# Patient Record
Sex: FEMALE | Race: WHITE | NOT HISPANIC OR LATINO
[De-identification: names, ages, dates, MRNs, and addresses within clinical notes are randomized per-mention and may not be internally consistent; named-entity substitution may affect disease eponyms.]

---

## 2017-04-06 ENCOUNTER — APPOINTMENT (OUTPATIENT)
Dept: PULMONOLOGY | Facility: CLINIC | Age: 72
End: 2017-04-06

## 2017-04-06 VITALS
DIASTOLIC BLOOD PRESSURE: 60 MMHG | SYSTOLIC BLOOD PRESSURE: 106 MMHG | HEART RATE: 81 BPM | OXYGEN SATURATION: 98 % | BODY MASS INDEX: 28.7 KG/M2 | HEIGHT: 63.5 IN | WEIGHT: 164 LBS

## 2017-04-07 ENCOUNTER — FORM ENCOUNTER (OUTPATIENT)
Age: 72
End: 2017-04-07

## 2017-04-08 ENCOUNTER — APPOINTMENT (OUTPATIENT)
Dept: CT IMAGING | Facility: CLINIC | Age: 72
End: 2017-04-08

## 2017-04-08 ENCOUNTER — OUTPATIENT (OUTPATIENT)
Dept: OUTPATIENT SERVICES | Facility: HOSPITAL | Age: 72
LOS: 1 days | End: 2017-04-08
Payer: MEDICARE

## 2017-04-08 DIAGNOSIS — R91.1 SOLITARY PULMONARY NODULE: ICD-10-CM

## 2017-04-08 PROCEDURE — 71250 CT THORAX DX C-: CPT

## 2017-10-19 ENCOUNTER — APPOINTMENT (OUTPATIENT)
Dept: PULMONOLOGY | Facility: CLINIC | Age: 72
End: 2017-10-19
Payer: MEDICARE

## 2017-10-19 VITALS
TEMPERATURE: 97.5 F | SYSTOLIC BLOOD PRESSURE: 114 MMHG | BODY MASS INDEX: 28.25 KG/M2 | OXYGEN SATURATION: 95 % | RESPIRATION RATE: 16 BRPM | HEART RATE: 91 BPM | DIASTOLIC BLOOD PRESSURE: 70 MMHG | WEIGHT: 162 LBS

## 2017-10-19 PROCEDURE — 94729 DIFFUSING CAPACITY: CPT

## 2017-10-19 PROCEDURE — 94060 EVALUATION OF WHEEZING: CPT

## 2017-10-19 PROCEDURE — 99215 OFFICE O/P EST HI 40 MIN: CPT | Mod: 25

## 2017-10-19 PROCEDURE — 94727 GAS DIL/WSHOT DETER LNG VOL: CPT

## 2018-04-19 ENCOUNTER — APPOINTMENT (OUTPATIENT)
Dept: PULMONOLOGY | Facility: CLINIC | Age: 73
End: 2018-04-19
Payer: MEDICARE

## 2018-04-19 VITALS
RESPIRATION RATE: 14 BRPM | DIASTOLIC BLOOD PRESSURE: 70 MMHG | TEMPERATURE: 97.5 F | OXYGEN SATURATION: 94 % | SYSTOLIC BLOOD PRESSURE: 112 MMHG | WEIGHT: 160 LBS | HEART RATE: 76 BPM | BODY MASS INDEX: 27.9 KG/M2

## 2018-04-19 PROCEDURE — 99214 OFFICE O/P EST MOD 30 MIN: CPT

## 2018-04-22 ENCOUNTER — FORM ENCOUNTER (OUTPATIENT)
Age: 73
End: 2018-04-22

## 2018-04-23 ENCOUNTER — APPOINTMENT (OUTPATIENT)
Dept: CT IMAGING | Facility: CLINIC | Age: 73
End: 2018-04-23
Payer: MEDICARE

## 2018-04-23 ENCOUNTER — OUTPATIENT (OUTPATIENT)
Dept: OUTPATIENT SERVICES | Facility: HOSPITAL | Age: 73
LOS: 1 days | End: 2018-04-23
Payer: MEDICARE

## 2018-04-23 DIAGNOSIS — Z00.8 ENCOUNTER FOR OTHER GENERAL EXAMINATION: ICD-10-CM

## 2018-04-23 PROCEDURE — 71250 CT THORAX DX C-: CPT

## 2018-04-23 PROCEDURE — 71250 CT THORAX DX C-: CPT | Mod: 26

## 2018-10-18 ENCOUNTER — APPOINTMENT (OUTPATIENT)
Dept: PULMONOLOGY | Facility: CLINIC | Age: 73
End: 2018-10-18
Payer: MEDICARE

## 2018-10-18 VITALS
HEART RATE: 81 BPM | RESPIRATION RATE: 12 BRPM | OXYGEN SATURATION: 97 % | BODY MASS INDEX: 27.38 KG/M2 | DIASTOLIC BLOOD PRESSURE: 70 MMHG | SYSTOLIC BLOOD PRESSURE: 126 MMHG | WEIGHT: 157 LBS | TEMPERATURE: 98.2 F

## 2018-10-18 PROCEDURE — 90662 IIV NO PRSV INCREASED AG IM: CPT

## 2018-10-18 PROCEDURE — G0008: CPT

## 2018-10-18 PROCEDURE — 99214 OFFICE O/P EST MOD 30 MIN: CPT | Mod: 25

## 2018-11-08 ENCOUNTER — MED ADMIN CHARGE (OUTPATIENT)
Age: 73
End: 2018-11-08

## 2019-04-18 ENCOUNTER — APPOINTMENT (OUTPATIENT)
Dept: PULMONOLOGY | Facility: CLINIC | Age: 74
End: 2019-04-18

## 2019-05-16 ENCOUNTER — APPOINTMENT (OUTPATIENT)
Dept: PULMONOLOGY | Facility: CLINIC | Age: 74
End: 2019-05-16
Payer: MEDICARE

## 2019-05-16 ENCOUNTER — TRANSCRIPTION ENCOUNTER (OUTPATIENT)
Age: 74
End: 2019-05-16

## 2019-05-16 VITALS
HEART RATE: 82 BPM | BODY MASS INDEX: 28.35 KG/M2 | HEIGHT: 63.5 IN | WEIGHT: 162 LBS | OXYGEN SATURATION: 95 % | SYSTOLIC BLOOD PRESSURE: 122 MMHG | DIASTOLIC BLOOD PRESSURE: 62 MMHG

## 2019-05-16 PROCEDURE — 99215 OFFICE O/P EST HI 40 MIN: CPT | Mod: 25

## 2019-05-16 PROCEDURE — 94060 EVALUATION OF WHEEZING: CPT

## 2019-05-16 PROCEDURE — 94729 DIFFUSING CAPACITY: CPT

## 2019-05-16 PROCEDURE — 94727 GAS DIL/WSHOT DETER LNG VOL: CPT

## 2019-05-16 RX ORDER — LANSOPRAZOLE 30 MG/1
30 CAPSULE, DELAYED RELEASE ORAL
Qty: 90 | Refills: 0 | Status: ACTIVE | COMMUNITY
Start: 2019-05-10

## 2019-05-16 RX ORDER — PREDNISONE 20 MG/1
20 TABLET ORAL
Qty: 10 | Refills: 0 | Status: COMPLETED | COMMUNITY
Start: 2018-12-17

## 2019-05-16 RX ORDER — DIAZEPAM 5 MG/1
5 TABLET ORAL
Qty: 90 | Refills: 0 | Status: ACTIVE | COMMUNITY
Start: 2019-04-18

## 2019-05-16 RX ORDER — FLUTICASONE PROPIONATE 50 UG/1
50 SPRAY, METERED NASAL
Qty: 16 | Refills: 0 | Status: ACTIVE | COMMUNITY
Start: 2018-12-17

## 2019-05-17 NOTE — PROCEDURE
[FreeTextEntry1] : PFT 5/56/19: Mild obstructive airways disease was noted. Lung volumes were normal. Diffusion was mildly reduced. No significant improvement was present after inhalation of bronchodilator.\par \par Chest CT from  April 23, 2018 demonstrated tree in bud opacities in the right upper lobe and a groundglass opacity in the right upper lobe as well. Emphysematous changes were noted.\par \par

## 2019-05-17 NOTE — HISTORY OF PRESENT ILLNESS
[FreeTextEntry1] : She came in for a routine followup visit today.\par \par She is feeling well. Denied any cough wheezing or shortness of breath. No chest pain, pressure or palpitations. She remains active. Baby sits for two grandchildren. \par \par Remains on on Symbicort and Spiriva.She does not smoke.

## 2019-05-17 NOTE — PHYSICAL EXAM
[Normal Appearance] : normal appearance [General Appearance - Well Developed] : well developed [Normal Oropharynx] : normal oropharynx [Heart Sounds] : normal S1 and S2 [Exaggerated Use Of Accessory Muscles For Inspiration] : no accessory muscle use [Abdomen Soft] : soft [Auscultation Breath Sounds / Voice Sounds] : lungs were clear to auscultation bilaterally [Bowel Sounds] : normal bowel sounds [Nail Clubbing] : no clubbing of the fingernails [Abnormal Walk] : normal gait [Skin Turgor] : normal skin turgor [No Focal Deficits] : no focal deficits [] : no rash [Oriented To Time, Place, And Person] : oriented to person, place, and time [General Appearance - In No Acute Distress] : no acute distress [Neck Cervical Mass (___cm)] : no neck mass was observed [Erythema] : no erythema of the pharynx

## 2019-05-17 NOTE — DISCUSSION/SUMMARY
[FreeTextEntry1] : She is a 74 year-old, former smoker (stopped in 2016) with moderate COPD. She also has abnormal findings on chest CT as noted above.\par \par Her COPD is clinically stable. She was advised to continue with Spiriva and Symbicort.\par \par Followup CT of the chest will be obtained.\par \par Follow up in 6 months. Sooner as needed.

## 2019-05-17 NOTE — REVIEW OF SYSTEMS
[Fatigue] : no fatigue [Cough] : no cough [Dyspnea] : no dyspnea [Hemoptysis] : no hemoptysis [Chest Tightness] : no chest tightness [Wheezing] : no wheezing [Pleuritic Pain] : no pleuritic pain [Palpitations] : no palpitations [PND] : no PND [Chest Discomfort] : no chest discomfort [Heartburn] : no heartburn [Edema] : ~T edema was not present [Myalgias] : no myalgias [Arthralgias] : no arthralgias [Rash] : no [unfilled] rash [Anemia] : no anemia [Difficulty Initiating Sleep] : no difficulty falling asleep [Diabetes] : no diabetes mellitus [Snoring] : no snoring

## 2019-08-04 ENCOUNTER — FORM ENCOUNTER (OUTPATIENT)
Age: 74
End: 2019-08-04

## 2019-08-05 ENCOUNTER — OUTPATIENT (OUTPATIENT)
Dept: OUTPATIENT SERVICES | Facility: HOSPITAL | Age: 74
LOS: 1 days | End: 2019-08-05
Payer: MEDICARE

## 2019-08-05 ENCOUNTER — APPOINTMENT (OUTPATIENT)
Dept: CT IMAGING | Facility: CLINIC | Age: 74
End: 2019-08-05
Payer: MEDICARE

## 2019-08-05 DIAGNOSIS — Z00.8 ENCOUNTER FOR OTHER GENERAL EXAMINATION: ICD-10-CM

## 2019-08-05 PROCEDURE — 71250 CT THORAX DX C-: CPT

## 2019-08-05 PROCEDURE — 71250 CT THORAX DX C-: CPT | Mod: 26

## 2019-11-07 ENCOUNTER — APPOINTMENT (OUTPATIENT)
Dept: GASTROENTEROLOGY | Facility: CLINIC | Age: 74
End: 2019-11-07

## 2019-11-14 ENCOUNTER — APPOINTMENT (OUTPATIENT)
Dept: PULMONOLOGY | Facility: CLINIC | Age: 74
End: 2019-11-14
Payer: MEDICARE

## 2019-11-14 VITALS
TEMPERATURE: 97.9 F | WEIGHT: 162 LBS | DIASTOLIC BLOOD PRESSURE: 83 MMHG | OXYGEN SATURATION: 94 % | HEART RATE: 83 BPM | RESPIRATION RATE: 16 BRPM | HEIGHT: 63 IN | BODY MASS INDEX: 28.7 KG/M2 | SYSTOLIC BLOOD PRESSURE: 123 MMHG

## 2019-11-14 PROCEDURE — G0008: CPT

## 2019-11-14 PROCEDURE — 90662 IIV NO PRSV INCREASED AG IM: CPT

## 2019-11-14 PROCEDURE — 99214 OFFICE O/P EST MOD 30 MIN: CPT | Mod: 25

## 2019-11-14 NOTE — PHYSICAL EXAM
[General Appearance - Well Developed] : well developed [Normal Appearance] : normal appearance [General Appearance - In No Acute Distress] : no acute distress [Normal Oropharynx] : normal oropharynx [Neck Cervical Mass (___cm)] : no neck mass was observed [Heart Sounds] : normal S1 and S2 [Auscultation Breath Sounds / Voice Sounds] : lungs were clear to auscultation bilaterally [Bowel Sounds] : normal bowel sounds [Abnormal Walk] : normal gait [Abdomen Soft] : soft [Nail Clubbing] : no clubbing of the fingernails [Skin Turgor] : normal skin turgor [] : no rash [No Focal Deficits] : no focal deficits [Oriented To Time, Place, And Person] : oriented to person, place, and time [Erythema] : no erythema of the pharynx

## 2019-11-14 NOTE — DISCUSSION/SUMMARY
[FreeTextEntry1] : She is a 74 year-old, former smoker (stopped in 2016) with moderate COPD. She also has abnormal findings on chest CT as noted above.\par \par Her COPD is stable. She was advised to continue with Spiriva and Symbicort (has been using Symbicort once a day and the Spiriva as needed). Proper use of the inhalers discussed. \par \par Flu shot given. \par \par Follow up in 6 months. Sooner as needed.

## 2019-11-14 NOTE — HISTORY OF PRESENT ILLNESS
[FreeTextEntry1] : She came in for a routine followup visit today.She is feeling well. Denied any cough wheezing or shortness of breath at rest. Has FORRESTER walking up steps. No chest pain, pressure or palpitations. She remains active.  \par \par Remains on on Symbicort and Spiriva.She does not smoke.

## 2019-11-14 NOTE — PROCEDURE
[FreeTextEntry1] : PFT 5/56/19: Mild obstructive airways disease was noted. Lung volumes were normal. Diffusion was mildly reduced. No significant improvement was present after inhalation of bronchodilator.\par \par CT Chest 4/23/18: Tree in bud opacities in the right upper lobe and a ground glass opacity in the right upper lobe was noted. Mild emphysematous changes were noted.\par \par CT Chest 8/5/19: No suspicious pulmonary nodules were noted. Emphysema was present. Evidence of airways inflammation was also noted. Heart size was normal.\par \par

## 2019-11-14 NOTE — REVIEW OF SYSTEMS
[Fever] : no fever [Fatigue] : no fatigue [Cough] : no cough [Dyspnea] : no dyspnea [Hemoptysis] : no hemoptysis [Wheezing] : no wheezing [Chest Discomfort] : no chest discomfort [PND] : no PND [Palpitations] : no palpitations [Edema] : ~T edema was not present [Heartburn] : no heartburn [Myalgias] : no myalgias [Arthralgias] : no arthralgias [Rash] : no [unfilled] rash [Diabetes] : no diabetes mellitus [Anemia] : no anemia [Difficulty Initiating Sleep] : no difficulty falling asleep [Thyroid Problem] : no thyroid problem [Snoring] : no snoring

## 2020-05-14 ENCOUNTER — APPOINTMENT (OUTPATIENT)
Dept: PULMONOLOGY | Facility: CLINIC | Age: 75
End: 2020-05-14
Payer: MEDICARE

## 2020-05-14 VITALS
SYSTOLIC BLOOD PRESSURE: 102 MMHG | RESPIRATION RATE: 16 BRPM | BODY MASS INDEX: 28.34 KG/M2 | DIASTOLIC BLOOD PRESSURE: 80 MMHG | HEART RATE: 90 BPM | TEMPERATURE: 98.1 F | WEIGHT: 160 LBS | OXYGEN SATURATION: 98 %

## 2020-05-14 PROCEDURE — 99214 OFFICE O/P EST MOD 30 MIN: CPT

## 2020-05-14 NOTE — REVIEW OF SYSTEMS
[Fever] : no fever [Chills] : no chills [Fatigue] : no fatigue [Cough] : no cough [Sputum] : not coughing up ~M sputum [Dyspnea] : dyspnea [Chest Tightness] : no chest tightness [Wheezing] : no wheezing [Chest Discomfort] : no chest discomfort [PND] : no PND [Palpitations] : no palpitations [Edema] : ~T edema was not present [Heartburn] : no heartburn [Myalgias] : no myalgias [Arthralgias] : no arthralgias [Rash] : no [unfilled] rash [Anemia] : no anemia [Diabetes] : no diabetes mellitus [Thyroid Problem] : no thyroid problem [DVT] : no DVT [Difficulty Initiating Sleep] : no difficulty falling asleep [Snoring] : no snoring

## 2020-05-14 NOTE — PHYSICAL EXAM
[General Appearance - In No Acute Distress] : no acute distress [Normal Oropharynx] : normal oropharynx [Neck Cervical Mass (___cm)] : no neck mass was observed [Auscultation Breath Sounds / Voice Sounds] : lungs were clear to auscultation bilaterally [Heart Sounds] : normal S1 and S2 [Bowel Sounds] : normal bowel sounds [Abnormal Walk] : normal gait [Nail Clubbing] : no clubbing of the fingernails [Skin Turgor] : normal skin turgor [Oriented To Time, Place, And Person] : oriented to person, place, and time [No Focal Deficits] : no focal deficits [Erythema] : no erythema of the pharynx [Neck Appearance] : the appearance of the neck was normal [] : no hepato-splenomegaly [Cyanosis, Localized] : no localized cyanosis

## 2020-05-14 NOTE — HISTORY OF PRESENT ILLNESS
[Former] : former [YearQuit] : 2016 [FreeTextEntry1] : She came in for a routine followup visit today.\par \par She is feeling well. Remains physically active and able to go for a walk daily. Has FORRESTER walking up steps. No chest pain, pressure or palpitations. \par \par Remains on on Symbicort and Spiriva.She does not smoke.

## 2020-05-14 NOTE — PROCEDURE
[FreeTextEntry1] : PFT 5/16/19: Mild obstructive airways disease was noted. Lung volumes were normal. Diffusion was mildly reduced. No significant improvement was present after inhalation of bronchodilator.\par \par CT Chest 4/23/18: Tree in bud opacities in the right upper lobe and a ground glass opacity in the right upper lobe was noted. Mild emphysematous changes were noted.\par \par CT Chest 8/5/19: No suspicious pulmonary nodules were noted. Emphysema was present. Evidence of airways inflammation was also noted. Heart size was normal.

## 2020-05-14 NOTE — DISCUSSION/SUMMARY
[FreeTextEntry1] : She is a 75 year-old, former smoker (stopped in 2016) with moderate COPD. She also has abnormal findings on chest CT as noted above.\par \par Her COPD remains stable. She was advised to continue with Spiriva and Symbicort (has been using Symbicort once a day and the Spiriva as needed). She was given a trial of Spiriva Respimat. Proper inhaler technique reviewed with the patient. \par \par Follow up in six months. Sooner if necessary.

## 2020-08-05 ENCOUNTER — APPOINTMENT (OUTPATIENT)
Dept: VASCULAR SURGERY | Facility: CLINIC | Age: 75
End: 2020-08-05

## 2020-11-19 ENCOUNTER — APPOINTMENT (OUTPATIENT)
Dept: PULMONOLOGY | Facility: CLINIC | Age: 75
End: 2020-11-19
Payer: MEDICARE

## 2020-11-19 VITALS
WEIGHT: 156 LBS | SYSTOLIC BLOOD PRESSURE: 110 MMHG | RESPIRATION RATE: 17 BRPM | OXYGEN SATURATION: 97 % | TEMPERATURE: 97 F | DIASTOLIC BLOOD PRESSURE: 86 MMHG | BODY MASS INDEX: 27.63 KG/M2 | HEART RATE: 78 BPM

## 2020-11-19 DIAGNOSIS — Z23 ENCOUNTER FOR IMMUNIZATION: ICD-10-CM

## 2020-11-19 PROCEDURE — 99214 OFFICE O/P EST MOD 30 MIN: CPT

## 2020-11-19 NOTE — REVIEW OF SYSTEMS
[Dyspnea] : dyspnea [Fatigue] : no fatigue [Nasal Congestion] : no nasal congestion [Cough] : no cough [Chest Tightness] : no chest tightness [Wheezing] : no wheezing [Chest Discomfort] : no chest discomfort [PND] : no PND [Palpitations] : no palpitations [Edema] : ~T edema was not present [Heartburn] : no heartburn [Myalgias] : no myalgias [Arthralgias] : no arthralgias [Rash] : no [unfilled] rash [Anemia] : no anemia [Diabetes] : no diabetes mellitus [Thyroid Problem] : no thyroid problem [DVT] : no DVT [Difficulty Initiating Sleep] : no difficulty falling asleep [Snoring] : no snoring

## 2020-11-19 NOTE — HISTORY OF PRESENT ILLNESS
[Former] : former [TextBox_4] : She came in for a routine followup visit today.  \par \par She is feeling well. Remains physically active and able to go for a walk daily.\par \par Remains on on Symbicort and Spiriva.\par \par She does not smoke.  [YearQuit] : 2016

## 2020-11-19 NOTE — PHYSICAL EXAM
[General Appearance - In No Acute Distress] : no acute distress [Neck Cervical Mass (___cm)] : no neck mass was observed [Heart Sounds] : normal S1 and S2 [Auscultation Breath Sounds / Voice Sounds] : lungs were clear to auscultation bilaterally [Abnormal Walk] : normal gait [Nail Clubbing] : no clubbing of the fingernails [Cyanosis, Localized] : no localized cyanosis [Skin Turgor] : normal skin turgor [] : no rash [No Focal Deficits] : no focal deficits [Oriented To Time, Place, And Person] : oriented to person, place, and time

## 2020-11-19 NOTE — DISCUSSION/SUMMARY
[FreeTextEntry1] : She is a 75 year-old, former smoker (stopped in 2016) with moderate COPD. CT Chest 8/5/19: No suspicious pulmonary nodules were noted. Emphysema was present. Evidence of airways inflammation was also noted. Heart size was normal.\par \par Her COPD remains stable. She was advised to continue with Spiriva and Symbicort \par \par CT in 6 months. \par \par Follow up 6 months. Sooner if necessary.

## 2021-05-20 ENCOUNTER — APPOINTMENT (OUTPATIENT)
Dept: PULMONOLOGY | Facility: CLINIC | Age: 76
End: 2021-05-20
Payer: MEDICARE

## 2021-05-20 VITALS
TEMPERATURE: 98 F | WEIGHT: 154 LBS | DIASTOLIC BLOOD PRESSURE: 70 MMHG | SYSTOLIC BLOOD PRESSURE: 111 MMHG | HEIGHT: 63 IN | OXYGEN SATURATION: 95 % | RESPIRATION RATE: 16 BRPM | BODY MASS INDEX: 27.29 KG/M2 | HEART RATE: 84 BPM

## 2021-05-20 PROCEDURE — 99213 OFFICE O/P EST LOW 20 MIN: CPT

## 2021-05-21 NOTE — DISCUSSION/SUMMARY
[FreeTextEntry1] : She is a 76 year-old, former smoker (stopped in 2016) with moderate COPD. CT from 4/23/18 showed tree in bud nodules with a ground glass nodule in the RUL. Atelectasis in lingula and RML present. CT Chest 8/5/19: No suspicious pulmonary nodules were noted. Emphysema was present. Evidence of airways inflammation was also noted. Heart size was normal.\par \par Her COPD remains stable. She was advised to continue with Spiriva and Symbicort \par \par Given the history of nodules and smoking a follow up CT of the Chest was requested. \par \par Follow up in 6 months.

## 2021-05-21 NOTE — HISTORY OF PRESENT ILLNESS
[Former] : former [TextBox_4] : She is feeling well. No complaint of cough, wheezing or shortness of breath. \par \par On Symbicort. \par \par She is not smoking.  [YearQuit] : 2016

## 2021-05-21 NOTE — REVIEW OF SYSTEMS
[Fever] : no fever [Fatigue] : no fatigue [Nasal Congestion] : no nasal congestion [Cough] : no cough [Sputum] : not coughing up ~M sputum [Dyspnea] : no dyspnea [Wheezing] : no wheezing [Chest Discomfort] : no chest discomfort [PND] : no PND [Edema] : ~T edema was not present [Heartburn] : no heartburn [Myalgias] : no myalgias [Arthralgias] : no arthralgias [Rash] : no [unfilled] rash [Anemia] : no anemia [Anxiety] : no anxiety [Diabetes] : no diabetes mellitus [Thyroid Problem] : no thyroid problem [DVT] : no DVT [Difficulty Initiating Sleep] : no difficulty falling asleep [Snoring] : no snoring

## 2021-06-02 ENCOUNTER — APPOINTMENT (OUTPATIENT)
Dept: VASCULAR SURGERY | Facility: CLINIC | Age: 76
End: 2021-06-02
Payer: MEDICARE

## 2021-06-02 ENCOUNTER — NON-APPOINTMENT (OUTPATIENT)
Age: 76
End: 2021-06-02

## 2021-06-02 VITALS
HEIGHT: 63 IN | BODY MASS INDEX: 27.11 KG/M2 | TEMPERATURE: 96.3 F | WEIGHT: 153 LBS | HEART RATE: 68 BPM | DIASTOLIC BLOOD PRESSURE: 81 MMHG | SYSTOLIC BLOOD PRESSURE: 131 MMHG

## 2021-06-02 DIAGNOSIS — L85.3 XEROSIS CUTIS: ICD-10-CM

## 2021-06-02 DIAGNOSIS — I83.893 VARICOSE VEINS OF BILATERAL LOWER EXTREMITIES WITH OTHER COMPLICATIONS: ICD-10-CM

## 2021-06-02 PROCEDURE — 93970 EXTREMITY STUDY: CPT

## 2021-06-02 PROCEDURE — 99203 OFFICE O/P NEW LOW 30 MIN: CPT

## 2021-06-02 RX ORDER — AMMONIUM LACTATE 12 %
12 CREAM (GRAM) TOPICAL TWICE DAILY
Qty: 280 | Refills: 6 | Status: ACTIVE | COMMUNITY
Start: 2021-06-02 | End: 1900-01-01

## 2021-06-10 ENCOUNTER — APPOINTMENT (OUTPATIENT)
Dept: NEUROLOGY | Facility: CLINIC | Age: 76
End: 2021-06-10
Payer: MEDICARE

## 2021-06-10 ENCOUNTER — LABORATORY RESULT (OUTPATIENT)
Age: 76
End: 2021-06-10

## 2021-06-10 ENCOUNTER — APPOINTMENT (OUTPATIENT)
Dept: CT IMAGING | Facility: CLINIC | Age: 76
End: 2021-06-10
Payer: MEDICARE

## 2021-06-10 ENCOUNTER — OUTPATIENT (OUTPATIENT)
Dept: OUTPATIENT SERVICES | Facility: HOSPITAL | Age: 76
LOS: 1 days | End: 2021-06-10
Payer: MEDICARE

## 2021-06-10 VITALS
HEART RATE: 64 BPM | DIASTOLIC BLOOD PRESSURE: 72 MMHG | HEIGHT: 63 IN | SYSTOLIC BLOOD PRESSURE: 140 MMHG | BODY MASS INDEX: 27.11 KG/M2 | WEIGHT: 153 LBS

## 2021-06-10 DIAGNOSIS — Z78.9 OTHER SPECIFIED HEALTH STATUS: ICD-10-CM

## 2021-06-10 DIAGNOSIS — R91.1 SOLITARY PULMONARY NODULE: ICD-10-CM

## 2021-06-10 DIAGNOSIS — Z83.79 FAMILY HISTORY OF OTHER DISEASES OF THE DIGESTIVE SYSTEM: ICD-10-CM

## 2021-06-10 PROCEDURE — 71250 CT THORAX DX C-: CPT | Mod: 26,ME

## 2021-06-10 PROCEDURE — 99204 OFFICE O/P NEW MOD 45 MIN: CPT

## 2021-06-10 PROCEDURE — G1004: CPT

## 2021-06-10 PROCEDURE — 71250 CT THORAX DX C-: CPT

## 2021-06-10 NOTE — HISTORY OF PRESENT ILLNESS
[FreeTextEntry1] : I had the pleasure of seeing Ms. MARIBEL GIBBS in the office today.  She is a 76 year right-handed patient who was referred for neurologic evaluation at your kind suggestion.\par \par Mrs. Gibbs was previously under the care of Dr. Brannon.  25 years ago, she experienced transient numbness of her right face.  MRI of the brain revealed a stroke.  Carotid Dopplers revealed plaque and she was treated with Plavix.  She was intolerant to statins.\par \par She has a history of cutaneous lymphoma.\par \par She has severe lower extremity venous insufficiency.  She complains of aching of her muscles in her arms and legs.  She experiences calf and toe cramping at night particularly after exertion.  She has been unable to spread her toes for about a year.  She complains of skin sensitivity in her feet and occasional tingling of her toes.  She has a history of intermittent low back pain.  She has a diseased right hip.  She has poor balance.  She suffers from positional vertigo.  She denies headaches, cognitive, visual, hearing, swallowing or bladder dysfunction.\par \par She completed vaccination for COVID-19.  She never contracted the virus.\par \par Medications include lansoprazole, clopidogrel, benzonatate, diazepam 5 mg daily, vitamin D3 and Symbicort.

## 2021-06-10 NOTE — CONSULT LETTER
[Dear  ___] : Dear  [unfilled], [Please see my note below.] : Please see my note below. [Consult Letter:] : I had the pleasure of evaluating your patient, [unfilled]. [Consult Closing:] : Thank you very much for allowing me to participate in the care of this patient.  If you have any questions, please do not hesitate to contact me. [Sincerely,] : Sincerely, [FreeTextEntry3] : Terrence Bell MD\par  [DrMakenna  ___] : Dr. PRUITT [DrMakenna ___] : Dr. PRUITT

## 2021-06-10 NOTE — PHYSICAL EXAM
[FreeTextEntry1] : Constitutional:  Patient was well-developed, well-nourished and in no acute distress. \par \par Head:  Normocephalic, atraumatic. Tympanic membranes were obscured by cerumen. \par \par Neck:  Supple with full range of motion. \par \par Cardiovascular:  Cardiac rhythm was regular without murmur. There were no carotid bruits. Peripheral pulses were full and symmetric. \par \par Respiratory:  Lungs were clear. \par \par Abdomen:  Soft and nontender. \par \par Spine: There was mild upper anterior scapular tenderness.  There was no pain on straight leg raising.  There is marked limitation of right hip movement.\par \par Skin:  There were no rashes. \par \par NEUROLOGICAL EXAMINATION:\par \par Mental Status: Patient was alert and oriented. Speech was fluent. There was no dysarthria. \par \par Cranial Nerves: \par \par II: She could finger count bilaterally. Pupils were equal and reactive. Visual fields were full. Funduscopic examination was normal. \par \par III, IV, VI:  Eye movements were full without nystagmus. \par \par V: Facial sensation was intact. \par \par VII: Facial strength was normal. \par \par VIII: Hearing was equal. \par \par IX, X: Palatal movement was normal. Phonation was normal. \par \par XI: Sternocleidomastoids and trapezii were normal. \par \par XII: Tongue was midline and movements normal. There was no lingual atrophy or fasciculations. \par \par Motor Examination: Muscle bulk, tone and strength were normal.  There was mild diffuse muscle tenderness.  There was no tremor or rigidity.  There were decreased fine finger and rapid alternating movements in the left more than right hands.\par \par Sensory Examination: There was shading pinprick in a stocking and glove distribution.  Vibration sense was mildly diminished in the feet.  Joint position sense was intact.\par \par Reflexes: DTRs were 1 at the biceps, 2 at the knees and absent elsewhere.\par \par Plantar Responses: Plantar responses were flexor. \par \par Coordination/Cerebellar Function: There was no dysmetria on finger to nose or heel to shin testing. \par \par Gait/Stance: Posture was mildly stooped.  Strides were short and stiff.  Turns were decomposed.  She swayed on Romberg testing.  Tandem was poor.\par

## 2021-06-10 NOTE — ASSESSMENT
[FreeTextEntry1] : Mrs. Rogers is a 76-year-old with untreated cutaneous lymphoma, COPD, GERD and remote history of stroke.  She presents with apparently longstanding myalgia, joint pains, muscle cramping, distal numbness and tingling and imbalance.  I suspect that she might suffer from a polyneuropathy.  Her prominent muscle complaints raise concern of stiff person syndrome or fibromyalgia.  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  I will obtain additional blood tests.  She will return to the office for EMG and nerve conduction studies.  Further management will depend upon these results and her clinical course.

## 2021-06-10 NOTE — REVIEW OF SYSTEMS
[Numbness] : numbness [Leg Weakness] : leg weakness [Abnormal Sensation] : an abnormal sensation [Tingling] : tingling [Difficulty Walking] : difficulty walking [Negative] : Heme/Lymph

## 2021-06-11 ENCOUNTER — NON-APPOINTMENT (OUTPATIENT)
Age: 76
End: 2021-06-11

## 2021-06-11 LAB
ALBUMIN MFR SERPL ELPH: 57 %
ALBUMIN SERPL ELPH-MCNC: 4.9 G/DL
ALBUMIN SERPL-MCNC: 4.3 G/DL
ALBUMIN/GLOB SERPL: 1.3 RATIO
ALP BLD-CCNC: 64 U/L
ALPHA1 GLOB MFR SERPL ELPH: 4 %
ALPHA1 GLOB SERPL ELPH-MCNC: 0.3 G/DL
ALPHA2 GLOB MFR SERPL ELPH: 11.9 %
ALPHA2 GLOB SERPL ELPH-MCNC: 0.9 G/DL
ALT SERPL-CCNC: 12 U/L
ANION GAP SERPL CALC-SCNC: 13 MMOL/L
AST SERPL-CCNC: 19 U/L
B-GLOBULIN MFR SERPL ELPH: 12.3 %
B-GLOBULIN SERPL ELPH-MCNC: 0.9 G/DL
BASOPHILS # BLD AUTO: 0.02 K/UL
BASOPHILS NFR BLD AUTO: 0.5 %
BILIRUB SERPL-MCNC: 0.7 MG/DL
BUN SERPL-MCNC: 22 MG/DL
CALCIUM SERPL-MCNC: 9.8 MG/DL
CHLORIDE SERPL-SCNC: 104 MMOL/L
CK SERPL-CCNC: 129 U/L
CO2 SERPL-SCNC: 25 MMOL/L
CREAT SERPL-MCNC: 0.7 MG/DL
CRP SERPL-MCNC: 4 MG/L
DEPRECATED KAPPA LC FREE/LAMBDA SER: 1.41 RATIO
EOSINOPHIL # BLD AUTO: 0.1 K/UL
EOSINOPHIL NFR BLD AUTO: 2.3 %
ERYTHROCYTE [SEDIMENTATION RATE] IN BLOOD BY WESTERGREN METHOD: 27 MM/HR
ESTIMATED AVERAGE GLUCOSE: 117 MG/DL
GAMMA GLOB FLD ELPH-MCNC: 1.1 G/DL
GAMMA GLOB MFR SERPL ELPH: 14.8 %
GLUCOSE SERPL-MCNC: 92 MG/DL
HBA1C MFR BLD HPLC: 5.7 %
HCT VFR BLD CALC: 36.8 %
HCV AB SER QL: NONREACTIVE
HCV S/CO RATIO: 0.11 S/CO
HCYS SERPL-MCNC: 14.7 UMOL/L
HGB BLD-MCNC: 11.5 G/DL
IGA SER QL IEP: 351 MG/DL
IGG SER QL IEP: 1079 MG/DL
IGM SER QL IEP: 110 MG/DL
IMM GRANULOCYTES NFR BLD AUTO: 0 %
INTERPRETATION SERPL IEP-IMP: NORMAL
KAPPA LC CSF-MCNC: 1.49 MG/DL
KAPPA LC SERPL-MCNC: 2.1 MG/DL
LYMPHOCYTES # BLD AUTO: 2.39 K/UL
LYMPHOCYTES NFR BLD AUTO: 56 %
M PROTEIN SPEC IFE-MCNC: NORMAL
MAN DIFF?: NORMAL
MCHC RBC-ENTMCNC: 28.6 PG
MCHC RBC-ENTMCNC: 31.3 GM/DL
MCV RBC AUTO: 91.5 FL
MONOCYTES # BLD AUTO: 0.44 K/UL
MONOCYTES NFR BLD AUTO: 10.3 %
NEUTROPHILS # BLD AUTO: 1.32 K/UL
NEUTROPHILS NFR BLD AUTO: 30.9 %
PLATELET # BLD AUTO: 259 K/UL
POTASSIUM SERPL-SCNC: 3.9 MMOL/L
PROT SERPL-MCNC: 7.5 G/DL
PROT SERPL-MCNC: 7.6 G/DL
PROT SERPL-MCNC: 7.6 G/DL
RBC # BLD: 4.02 M/UL
RBC # FLD: 13.2 %
RHEUMATOID FACT SER QL: 14 IU/ML
SODIUM SERPL-SCNC: 142 MMOL/L
T PALLIDUM AB SER QL IA: NEGATIVE
TSH SERPL-ACNC: 1.39 UIU/ML
VIT B12 SERPL-MCNC: 314 PG/ML
WBC # FLD AUTO: 4.27 K/UL

## 2021-06-13 LAB
CCP AB SER IA-ACNC: <8 UNITS
RF+CCP IGG SER-IMP: NEGATIVE

## 2021-06-14 LAB
A-TOCOPHEROL VIT E SERPL-MCNC: 7.8 MG/L
ANACR T: NEGATIVE
BETA+GAMMA TOCOPHEROL SERPL-MCNC: 0.8 MG/L

## 2021-06-15 ENCOUNTER — NON-APPOINTMENT (OUTPATIENT)
Age: 76
End: 2021-06-15

## 2021-06-15 LAB
COPPER SERPL-MCNC: 120 UG/DL
IGA 24H UR QL IFE: NORMAL
VIT B6 SERPL-MCNC: 8.8 UG/L
ZINC SERPL-MCNC: 102 UG/DL

## 2021-06-16 LAB
AMPA-R ABCBA: NEGATIVE
AMPHIPHYSIN IGG TITR SER IF: NEGATIVE TITER
B BURGDOR AB SER-IMP: NEGATIVE
B BURGDOR IGM PATRN SER IB-IMP: NEGATIVE
B BURGDOR18KD IGG SER QL IB: NORMAL
B BURGDOR23KD IGG SER QL IB: NORMAL
B BURGDOR23KD IGM SER QL IB: NORMAL
B BURGDOR28KD IGG SER QL IB: NORMAL
B BURGDOR30KD IGG SER QL IB: NORMAL
B BURGDOR31KD IGG SER QL IB: NORMAL
B BURGDOR39KD IGG SER QL IB: PRESENT
B BURGDOR39KD IGM SER QL IB: NORMAL
B BURGDOR41KD IGG SER QL IB: NORMAL
B BURGDOR41KD IGM SER QL IB: NORMAL
B BURGDOR45KD IGG SER QL IB: NORMAL
B BURGDOR58KD IGG SER QL IB: NORMAL
B BURGDOR66KD IGG SER QL IB: NORMAL
B BURGDOR93KD IGG SER QL IB: NORMAL
CASPR2-IGG CBA, S: NEGATIVE
CV2 IGG TITR SER: NEGATIVE TITER
GABA-B ABCBA: NEGATIVE
GAD65 AB SER-MCNC: 0 NMOL/L
GLIAL NUC TYPE 1 AB TITR SER: NEGATIVE TITER
HTLV I+II AB SER QL: NORMAL
HU1 AB TITR SER: NEGATIVE TITER
HU2 AB TITR SER IF: NEGATIVE TITER
HU3 AB TITR SER: NEGATIVE TITER
IGLON5 IFA, S: NEGATIVE
IMMUNOLOGIST REVIEW: NORMAL
LGI1-IGG CBA, S: NEGATIVE
MYELOPEROXIDASE AB SER QL IA: <5 UNITS
MYELOPEROXIDASE CELLS FLD QL: NEGATIVE
NIF IFA, S: NEGATIVE
NMDA-R ABCBA: NEGATIVE
PCA-1 AB TITR SER: NEGATIVE TITER
PCA-2 AB TITR SER: NEGATIVE TITER
PCA-TR AB TITR SER: NEGATIVE TITER
PROTEINASE3 AB SER IA-ACNC: <5 UNITS
PROTEINASE3 AB SER-ACNC: NEGATIVE
REFLEX ADDED: NORMAL

## 2021-06-17 LAB — METHYLMALONATE SERPL-SCNC: 285 NMOL/L

## 2021-06-23 ENCOUNTER — NON-APPOINTMENT (OUTPATIENT)
Age: 76
End: 2021-06-23

## 2021-06-24 LAB — SENSORIMOTOR NEUROPATHY PROFILE W/ RECOMBX: NORMAL

## 2021-07-02 LAB — HEREDITARY NEUROPATHY PANEL: NEGATIVE

## 2021-08-17 ENCOUNTER — APPOINTMENT (OUTPATIENT)
Dept: NEUROLOGY | Facility: CLINIC | Age: 76
End: 2021-08-17
Payer: MEDICARE

## 2021-08-17 VITALS
BODY MASS INDEX: 27.11 KG/M2 | HEART RATE: 61 BPM | HEIGHT: 63 IN | DIASTOLIC BLOOD PRESSURE: 69 MMHG | SYSTOLIC BLOOD PRESSURE: 133 MMHG | WEIGHT: 153 LBS

## 2021-08-17 DIAGNOSIS — M54.16 RADICULOPATHY, LUMBAR REGION: ICD-10-CM

## 2021-08-17 DIAGNOSIS — G62.9 POLYNEUROPATHY, UNSPECIFIED: ICD-10-CM

## 2021-08-17 DIAGNOSIS — G57.61 LESION OF PLANTAR NERVE, RIGHT LOWER LIMB: ICD-10-CM

## 2021-08-17 PROCEDURE — 95911 NRV CNDJ TEST 9-10 STUDIES: CPT

## 2021-08-17 PROCEDURE — 95886 MUSC TEST DONE W/N TEST COMP: CPT

## 2021-08-17 NOTE — CONSULT LETTER
[Dear  ___] : Dear  [unfilled], [Consult Letter:] : I had the pleasure of evaluating your patient, [unfilled]. [Please see my note below.] : Please see my note below. [Consult Closing:] : Thank you very much for allowing me to participate in the care of this patient.  If you have any questions, please do not hesitate to contact me. [Sincerely,] : Sincerely, [FreeTextEntry3] : Terrence Bell MD\par  [DrMakenna  ___] : Dr. PRUITT [DrMakenna ___] : Dr. PRUITT

## 2021-08-17 NOTE — PROCEDURE
[FreeTextEntry1] : Nerve conduction studies and electromyography [FreeTextEntry2] : Lower extremity numbness and cramping and low back pain [FreeTextEntry3] : Electrodiagnostic testing was performed in the right upper and both lower extremities.\par \par The right radial and median sensory potentials were normal but conduction velocities were mildly slow.\par \par The median motor distal latency and compound muscle action potential were normal.  Median conduction velocity was mildly slow.  The median F wave was normal.\par \par The right sural nerve was nonreactive.  The left sural sensory potential was low in amplitude but conduction velocity was normal.\par \par The right peroneal motor distal latency and compound muscle action potential were normal.  Peroneal conduction velocity was mildly slow.  The left tibial motor distal latency was mildly prolonged and the right was normal.  The tibial compound muscle action potentials were low in amplitude but conduction velocities were normal.\par \par The right tibial F wave was nonreactive and left was mildly prolonged.  The tibial H reflexes were nonreactive.\par \par Needle electromyography was performed in several right lower extremity and lumbar paraspinal muscles.  There was no spontaneous activity noted in any muscle tested.  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  The absent or prolonged tibial late responses were possibly due to bilateral S1 radiculopathy.  The low amplitude tibial compound muscle action potentials were possibly consistent with bilateral plantar neuropathies.\par \par A comprehensive serologic evaluation including a hereditary neuropathy panel was unremarkable except for hemoglobin A1c of 5.7.  Alpha-tocopherol was mildly low.  Rheumatoid factor was mildly elevated.  Hemoglobin was 11.5.\par \par I reexamined Mrs. Rogers.  She has very prominent bilateral weakness of toe abduction and prominent sensory loss predominantly involving the plantar aspects of both feet.  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  Further management will depend upon those results and her clinical course.

## 2021-08-18 ENCOUNTER — APPOINTMENT (OUTPATIENT)
Dept: MRI IMAGING | Facility: CLINIC | Age: 76
End: 2021-08-18
Payer: MEDICARE

## 2021-08-18 ENCOUNTER — OUTPATIENT (OUTPATIENT)
Dept: OUTPATIENT SERVICES | Facility: HOSPITAL | Age: 76
LOS: 1 days | End: 2021-08-18
Payer: MEDICARE

## 2021-08-18 DIAGNOSIS — M54.5 LOW BACK PAIN: ICD-10-CM

## 2021-08-18 PROCEDURE — 73718 MRI LOWER EXTREMITY W/O DYE: CPT

## 2021-08-18 PROCEDURE — 72148 MRI LUMBAR SPINE W/O DYE: CPT | Mod: 26,MH

## 2021-08-18 PROCEDURE — 73718 MRI LOWER EXTREMITY W/O DYE: CPT | Mod: 26,RT,MH

## 2021-08-18 PROCEDURE — 72148 MRI LUMBAR SPINE W/O DYE: CPT

## 2021-08-22 ENCOUNTER — NON-APPOINTMENT (OUTPATIENT)
Age: 76
End: 2021-08-22

## 2021-08-24 ENCOUNTER — TRANSCRIPTION ENCOUNTER (OUTPATIENT)
Age: 76
End: 2021-08-24

## 2021-08-24 RX ORDER — BACLOFEN 5 MG/1
5 TABLET ORAL
Qty: 180 | Refills: 0 | Status: ACTIVE | COMMUNITY
Start: 2021-08-24 | End: 1900-01-01

## 2021-11-18 ENCOUNTER — APPOINTMENT (OUTPATIENT)
Dept: PULMONOLOGY | Facility: CLINIC | Age: 76
End: 2021-11-18

## 2021-12-13 ENCOUNTER — APPOINTMENT (OUTPATIENT)
Dept: PULMONOLOGY | Facility: CLINIC | Age: 76
End: 2021-12-13

## 2021-12-26 ENCOUNTER — EMERGENCY (EMERGENCY)
Facility: HOSPITAL | Age: 76
LOS: 1 days | Discharge: HOME | End: 2021-12-26
Attending: EMERGENCY MEDICINE
Payer: MEDICARE

## 2021-12-26 VITALS
DIASTOLIC BLOOD PRESSURE: 84 MMHG | TEMPERATURE: 98 F | SYSTOLIC BLOOD PRESSURE: 146 MMHG | HEIGHT: 64 IN | RESPIRATION RATE: 18 BRPM | HEART RATE: 78 BPM | OXYGEN SATURATION: 98 % | WEIGHT: 145.95 LBS

## 2021-12-26 VITALS
OXYGEN SATURATION: 98 % | HEART RATE: 76 BPM | TEMPERATURE: 98 F | DIASTOLIC BLOOD PRESSURE: 77 MMHG | RESPIRATION RATE: 16 BRPM | SYSTOLIC BLOOD PRESSURE: 132 MMHG

## 2021-12-26 PROCEDURE — 99285 EMERGENCY DEPT VISIT HI MDM: CPT | Mod: 25

## 2021-12-26 PROCEDURE — 73110 X-RAY EXAM OF WRIST: CPT

## 2021-12-26 PROCEDURE — 25605 CLTX DST RDL FX/EPHYS SEP W/: CPT | Mod: LT

## 2021-12-26 PROCEDURE — 99284 EMERGENCY DEPT VISIT MOD MDM: CPT | Mod: GC

## 2021-12-26 PROCEDURE — 73090 X-RAY EXAM OF FOREARM: CPT | Mod: 26,LT

## 2021-12-26 PROCEDURE — 73090 X-RAY EXAM OF FOREARM: CPT

## 2021-12-26 PROCEDURE — 73110 X-RAY EXAM OF WRIST: CPT | Mod: 26,LT

## 2021-12-26 PROCEDURE — 73130 X-RAY EXAM OF HAND: CPT | Mod: 26,LT

## 2021-12-26 PROCEDURE — 73130 X-RAY EXAM OF HAND: CPT

## 2021-12-26 PROCEDURE — 99283 EMERGENCY DEPT VISIT LOW MDM: CPT

## 2021-12-26 NOTE — ED PROVIDER NOTE - PHYSICAL EXAMINATION
gen: well appearing, left arm in sling  Mentation: AAO x 3  psych: mood appropriate  HEENT: airway patent, conjunctivae clear bilaterally  Cardio: RRR, no m/r/g, palpable radial pulse on left  Resp: normal BS b/l  GI: soft/nondistended/nontender  Neuro: sensation and motor function grossly intact, able to move fingers on left hand  Skin: No evidence of rash  MSK: Swelling of dorsal and volar aspect of the left wrist with tenderness to palpation gen: well appearing, left arm in sling  Mentation: AAO x 3  psych: mood appropriate  HEENT: airway patent, conjunctivae clear bilaterally  Cardio: RRR, no m/r/g, palpable radial pulse on left  Resp: normal BS b/l  GI: soft/nondistended/nontender  Neuro: sensation and motor function grossly intact, able to move fingers on left hand  Skin: No evidence of rash  MSK: Swelling of dorsal and volar aspect of the left wrist with tenderness to palpation      attn - alert, nad, head-, spine-, chest/ribs-, abdo-, upper extrem - deformity to left wrist, sensory intact, elbow/shoulder-, lower extrem-, neuro - grossly intact.

## 2021-12-26 NOTE — ED PROVIDER NOTE - PATIENT PORTAL LINK FT
You can access the FollowMyHealth Patient Portal offered by [facility] by registering at the following website: [email or web address]. By joining [facility]’s FollowMyHealth portal, you will also be able to view your health information using other applications (apps) compatible with our system.

## 2021-12-26 NOTE — ED PROVIDER NOTE - OBJECTIVE STATEMENT
76 year old female with a PMHx of TIAs, COPD, GERD presents after a fall. Patient had a mechanical trip and fall over a sprinkler and fell, bracing herself with left hand. Patient complains of pain and swelling. She took two extra strength tylenol. Patient takes plavix and pepcid. Denies hitting head, LOC, lightheadedness, CP, SOB, abdominal pain, nausea, vomiting. Patient is able to move her fingers. 76 year old female with a PMHx of TIAs, COPD, GERD presents after a fall. Patient had a mechanical trip and fall over a sprinkler and fell, bracing herself with left hand. Patient complains of pain and swelling. She took two extra strength tylenol. Patient takes plavix and pepcid. Denies hitting head, LOC, lightheadedness, CP, SOB, abdominal pain, nausea, vomiting. Patient is able to move her fingers.     Attn - pt seen in [facility]33[facility] - agree with above [place] and FOSH and c/o pain and deformity to left non-dominant wrist.  had paresthesia of finger tips - resolved.  no other injury.  no prior fx.

## 2021-12-26 NOTE — ED ADULT NURSE NOTE - OBJECTIVE STATEMENT
76F, AAO3, c/o L wrist pain s/p trip and fall over sprinkler head at the [facility] this AM. -LOC, denies hitting head, takes Plavix daily. Swelling and deformity noted to L hand and wrist. Able to move all fingers, denies numbness/tingling. Strong radial pulse noted. Denies dizziness. Speaking clear in full sentences. RR even and unlabored. Skin appropriate for age and race.

## 2021-12-26 NOTE — ED PROVIDER NOTE - CARE PROVIDER_API CALL
Phi Castro (MD)  Orthopaedic Surgery  [address], [address]  [place] [address]  Phone: [phone]  Fax: [phone]  Follow Up Time: 4-6 Days

## 2021-12-26 NOTE — ED PROVIDER NOTE - CLINICAL SUMMARY MEDICAL DECISION MAKING FREE TEXT BOX
Patient has a left wrist swelling following fall on left UEX. She complains of pain but asks not to be given any strong medication. She currently declines pain medication as she recently took tylenol. Xrays of left wrist, hand, and forearm. Patient has a left wrist swelling following fall on left UEX. She complains of pain but asks not to be given any strong medication. She currently declines pain medication as she recently took tylenol. Xrays of left wrist, hand, and forearm.     Attn - deformity and pain to left wrist - clinically fx - xray analgesia, ortho consult.

## 2021-12-26 NOTE — ED PROVIDER NOTE - NSFOLLOWUPINSTRUCTIONS_ED_ALL_ED_FT
Please follow up with Phi Castro MD on Friday, 12/31/21.     Wrist Fracture    A wrist fracture is a break or crack in one of the bones of your wrist. Your wrist is made up of eight small bones at the palm of your hand (carpal bones) and two long bones that make up your forearm (radius and ulna).    If the joint is stable and the bones are still in their normal position (nondisplaced), the injury may be treated with immobilization. This involves the use of a cast, splint, or sling to hold your arm in place. Immobilization ensures that your bones continue to stay in the correct position while your arm is healing.    What are the causes?  This condition may be caused by:    A direct force to the wrist.  Falling on an outstretched hand.  Trauma, such as a car accident or a fall.    What increases the risk?  This condition is more likely to develop in people who:    Do contact and high-risk sports, such as skiing, biking, and ice skating.  Take steroid medicines.  Smoke.  Are female.  Are .  Drink more than three alcoholic beverages per day.  Have low or lowered bone density (osteoporosis or osteopenia).  Are older.  Have a history of previous fractures.    What are the signs or symptoms?  Symptoms of this condition include:    Pain.  Swelling.  Bruising.  Not being able to move the wrist normally.    Additionally, the wrist may hang in an odd position or appear deformed.    How is this diagnosed?  This condition may be diagnosed based on a physical exam and X-rays. You may also have a CT scan or MRI.    How is this treated?  Treatment for this condition involves wearing a cast or splint until the injured area is stable enough for you to begin range-of-motion exercises. You also may be given a sling. You may also be prescribed pain medicine.    Follow these instructions at home:  If you have a splint:     Wear the splint as told by your health care provider. Remove it only as told by your health care provider.  Loosen the splint if your fingers tingle, become numb, or turn cold and blue.  Do not let your splint get wet if it is not waterproof.  Keep the splint clean.  If you have a sling:     Wear it as told by your health care provider. Remove it only as told by your health care provider.  If you have a cast:     Do not stick anything inside the cast to scratch your skin. Doing that increases your risk of infection.  Check the skin around the cast every day. Report any concerns to your health care provider. You may put lotion on dry skin around the edges of the cast. Do not apply lotion to the skin underneath the cast.  Do not let your cast get wet if it is not waterproof.  Keep the cast clean.  Bathing     Do not take baths, swim, or use a hot tub until your health care provider approves. Ask your health care provider if you can take showers. You may only be allowed to take sponge baths for bathing.  If your cast or splint is not waterproof, cover it with a watertight plastic bag when you take a bath or a shower.  If you have a sling, remove it for bathing only if your health care provider tells you that it is safe to do that.  Managing pain, stiffness, and swelling     If directed, apply ice to the injured area.    Put ice in a plastic bag.  Place a towel between your skin and the bag.  Leave the ice on for 20 minutes, 2–3 times per day.    Move your fingers often to avoid stiffness and to lessen swelling.  Raise (elevate) the injured area above the level of your heart while you are sitting or lying down.  Driving     Do not drive or operate heavy machinery while taking prescription pain medicine.  Ask your health care provider when it is safe to drive if you have a cast, splint, or sling on your wrist.  Activity     Return to your normal activities as told by your health care provider. Ask your health care provider what activities are safe for you.  Do range-of-motion exercises only as told by your health care provider or physical therapist.  General instructions     Do not put pressure on any part of the cast or splint until it is fully hardened. This may take several hours.  Do not use any tobacco products, such as cigarettes, chewing tobacco, and e-cigarettes. Tobacco can delay bone healing. If you need help quitting, ask your health care provider.  Take over-the-counter and prescription medicines only as told by your health care provider.  Keep all follow-up visits as told by your health care provider. This is important.  Contact a health care provider if:  Your cast, splint, or sling is damaged or loose.  You have any new pain, swelling, or bruising.  Your pain, swelling, and bruising do not improve.  You have a fever.  You have chills.  Get help right away if:  Your skin or fingers on your injured arm turn blue or gray.  Your arm feels cold or gets numb.  You have severe pain in your injured wrist.  This information is not intended to replace advice given to you by your health care provider. Make sure you discuss any questions you have with your health care provider.

## 2021-12-26 NOTE — ED PROVIDER NOTE - CHIEF COMPLAINT
The patient is a 76y Female complaining of fall. [Initial Evaluation] : an initial evaluation [FreeTextEntry2] : Dr. Carlyn Grimes

## 2021-12-26 NOTE — ED ADULT TRIAGE NOTE - WEIGHT IN KG
66.2

## 2021-12-26 NOTE — ED ADULT TRIAGE NOTE - PRO INTERPRETER NEED 2
From: Marni Ibrahim  Sent: 4/26/2019 1:33 PM CDT  To: RICKY Patiño Neuro Nurse Aretha Au  Subject: RE: RE: Medication Question    ----- Message from Patient Portal,  sent at 4/26/2019 1:33 PM CDT -----    Well they started the first time I upped the dose so a little over a week ago and I haven't had a seizure since last month like I told you and I had a pretty bad migraine on Friday the 19th to the point where i was vomiting at work and I can't take that dissolving one at work because it makes me dizzy and drowsy.     ----- Message -----  From: Office of Mendoza Finch MD  Sent: 4/26/19, 1:05 PM  To: Marni Ibrahim  Subject: RE: Medication Question    Good afternoon,    Can you please confirm when the side effects began or have you had them since you began the medication? Is the medication helping with your migraines or seizures at this pont?    Thank you,    Neurology support staff    ----- Message -----   From: Marni Ibrahim   Sent: 4/26/2019 12:19 PM CDT   To: Mendoza Finch MD  Subject: Medication Question    Hi. This medication for my seizures/migraines is kind of scaring me. Or I should say the side effects are. My hands going numb, the dizziness is pretty bad, the sweating/chills, deep sleeps and yet waking up exhausted. It's just a lot. I don't know what to do other than talk to you. As of yesterday I'm up to a pill and a half a day.   English

## 2021-12-26 NOTE — CONSULT NOTE ADULT - SUBJECTIVE AND OBJECTIVE BOX
Orthopedic Surgery Consult Note    76yFemale RHD presents s/p mechanical fall onto left arm. She tripped over a sprinkler head while on a walk. Reports pain and difficulty moving affected extremity afterward. Denies headstrike/LOC. Denies numbness/tingling of the affected extremity. No other bone or joint complaints.      PAST MEDICAL & SURGICAL HISTORY:  COPD (chronic obstructive pulmonary disease)      Allergies    aspirin (Vomiting)    Intolerances          PE  Gen: NAD  LUE:  Visible deformity, moderate swelling/ecchymosis about wrist, skin intact  Decreased ROM of wrist 2/2 pain, +ttp about wrist, no ttp about elbow  AIN/PIN/U motor Intact  SILT M/U/R  Compartments soft/compressible  Radial pulse palpable, WWP distally    Secondary: No TTP over bony prominences. SILT b/l, ROM intact b/l. Distal pulses palpable.     Imaging:  XR showing left distal radius fracture    Procedure Note:  After informed verbal consent obtained, patient underwent hematoma block at the fracture site.  Skin was cleaned with alcohol swab and fracture site was then injected with 10 cc of 1% lidocaine into hematoma. Hung in traction and closed reduction maneuver performed. Patient placed in well-padded, molded sugartong splint.  Patient tolerated the procedure well. Post reduction x-rays reviewed in improved alignment. Patient neurovascularly intact after procedure.     A/P: 76yFemale s/p closed reduction and splinting of left distal radius fracture  - Pain control  - Strict Ice/Elevation  - NWB on affected extremity in splint  - Sling for comfort  - Keep splint clean/dry/intact until follow up  - Encourage active finger motion  - Follow up with Dr. Castro in one week. Call [phone] for appointment    Discussed with attending orthopaedic trauma surgeon on call, Dr. Matthew Palacios PGY-2  Orthopaedic Surgery  [facility] [id number]  [facility] [id number]  [facility] [phone]

## 2021-12-30 ENCOUNTER — APPOINTMENT (OUTPATIENT)
Dept: ORTHOPEDIC SURGERY | Facility: CLINIC | Age: 76
End: 2021-12-30
Payer: MEDICARE

## 2021-12-30 VITALS — HEIGHT: 64 IN | BODY MASS INDEX: 24.75 KG/M2 | WEIGHT: 145 LBS

## 2021-12-30 PROCEDURE — 73110 X-RAY EXAM OF WRIST: CPT | Mod: LT

## 2021-12-30 PROCEDURE — 29075 APPL CST ELBW FNGR SHORT ARM: CPT | Mod: LT

## 2021-12-30 PROCEDURE — 99203 OFFICE O/P NEW LOW 30 MIN: CPT | Mod: 25

## 2021-12-30 NOTE — DISCUSSION/SUMMARY
[FreeTextEntry1] : The underlying pathophysiology was reviewed with the patient. XR films were reviewed with the patient. Discussed at length the nature of the patient’s condition. The left wrist symptoms appear secondary to distal radius and ulnar styloid fractures.\par \par At this time, risks and benefits of surgical versus nonsurgical management were discussed. I did discuss with the patient and her family member that there is a notable deformity to the wrist present and there is evidence of radial shortening. As such she will lose some function of the wrist due to the deformity. I did tell her that given her activity level I would recommend surgery. With that said, she has deferred surgical management at this time and has agreed to accept the deformity. She would additionally like to also further consider her treatment options despite electing for casting and was also provided with the information of our surgical scheduler in the event she changes her mind and elects to proceed with ORIF. \par She was placed into a left short arm cast today on 12/30/21.\par Proper cast care instructions were reviewed with the patient.\par She was instructed on elevation and pumping of the hand to reduce the swelling.\par She was encouraged to utilize the hand while casted for ADLs as tolerated.\par ROM exercises of the shoulder, elbow and hand were reviewed and encouraged.\par Tylenol prn. \par \par All questions answered, understanding verbalized. Patient in agreement with plan of care. Follow up in 1 week for repeat xrays in the cast.

## 2021-12-30 NOTE — HISTORY OF PRESENT ILLNESS
[Right] : right hand dominant [FreeTextEntry1] : Patient is a 76 year old female who presents to the office with left wrist pain secondary to injury sustained on Sunday 12/26/21 at which time she fell. She presented to [facility] ED on 12/26/21 at which time xrays were obtained and revealed a distal radius and ulnar styloid fracture. She was placed into a sugar tong splint. She denies numbness and tingling at this time but does note some pain while in the splint. There is edema present to the wrist and hand as a result of the injury. She notes to have been elevating the hand to reduce the swelling. She was given Oxycodone in the hospital however has not continued to take it due to GI issues. Of note, she cannot take NSAIDs as she suffers sever GI side effects as a result. \par \par Medical history which includes COPD.

## 2021-12-30 NOTE — ADDENDUM
[FreeTextEntry1] : I, Yari Martino wrote this note acting as a scribe for Dr. Scott Holden on Dec 30, 2021.

## 2021-12-30 NOTE — END OF VISIT
[FreeTextEntry3] : All medical record entries made by the Scribe were at my,  Dr. Scott Holden MD., direction and personally dictated by me on 12/30/2021. I have personally reviewed the chart and agree that the record accurately reflects my personal performance of the history, physical exam, assessment and plan.

## 2021-12-30 NOTE — PHYSICAL EXAM
[de-identified] : Patient is WDWN, alert, and in no acute distress. Breathing is unlabored. She is grossly oriented to person, place, and time.\par \par She is accompanied by a family member today.\par She presents in a sugar tong splint and shoulder sling. \par \par Left Wrist:\par ROM not accessed due to injury\par Mild radial deformity noted\par Bruising and discoloration present.\par Edema noted dorsally to the hand and wrist\par Limited ROM of the digits\par Normal sensation [de-identified] : AP, lateral and oblique views of the left wrist were obtained today and revealed a comminuted transverse fracture through the distal radius and fracture of the ulnar styloid process. Radial shortening is evident. 	 \par \par ------------------------------------------------------------------------------------------------------------------------------------------------------------------------------------\par \par EXAM: XR FOREARM 2 VIEWS LT\par PROCEDURE DATE: 12/26/2021\par IMPRESSION:\par Interval reduction of previously seen comminuted transverse fracture through the distal radial metadiaphysis and fracture of ulnar styloid process, with near-anatomic alignment.\par \par NIK HILARIO MD; Resident Radiologist\par This document has been electronically signed.\par DINORA RESTREPO MD; Attending Radiologist\par This document has been electronically signed. Dec 26 2021 1:03PM\par \par ------------------------------------------------------------------------------------------------------------------------------------------------------------------------------------\par \par EXAM: XR FOREARM 2 VIEWS LT\par EXAM: XR WRIST COMP MIN 3 VIEWS LT\par PROCEDURE DATE: 12/26/2021\par IMPRESSION:\par Comminuted transverse fracture through the distal radius metadiaphysis with volar apex angulation.\par \par Transverse fracture through the ulna styloid process.\par \par NIK HILARIO MD; Resident Radiologist\par This document has been electronically signed.\par DINORA RESTREPO MD; Attending Radiologist\par This document has been electronically signed. Dec 26 2021 12:23PM

## 2022-01-05 ENCOUNTER — OUTPATIENT (OUTPATIENT)
Dept: OUTPATIENT SERVICES | Facility: HOSPITAL | Age: 77
LOS: 1 days | End: 2022-01-05
Payer: MEDICARE

## 2022-01-05 VITALS
HEART RATE: 67 BPM | WEIGHT: 145.51 LBS | TEMPERATURE: 98 F | DIASTOLIC BLOOD PRESSURE: 73 MMHG | RESPIRATION RATE: 14 BRPM | SYSTOLIC BLOOD PRESSURE: 132 MMHG | HEIGHT: 63 IN | OXYGEN SATURATION: 97 %

## 2022-01-05 DIAGNOSIS — S52.502A UNSPECIFIED FRACTURE OF THE LOWER END OF LEFT RADIUS, INITIAL ENCOUNTER FOR CLOSED FRACTURE: ICD-10-CM

## 2022-01-05 DIAGNOSIS — Z01.818 ENCOUNTER FOR OTHER PREPROCEDURAL EXAMINATION: ICD-10-CM

## 2022-01-05 DIAGNOSIS — Z98.891 HISTORY OF UTERINE SCAR FROM PREVIOUS SURGERY: Chronic | ICD-10-CM

## 2022-01-05 DIAGNOSIS — S52.90XA UNSPECIFIED FRACTURE OF UNSPECIFIED FOREARM, INITIAL ENCOUNTER FOR CLOSED FRACTURE: ICD-10-CM

## 2022-01-05 DIAGNOSIS — Z79.01 LONG TERM (CURRENT) USE OF ANTICOAGULANTS: ICD-10-CM

## 2022-01-05 LAB — SARS-COV-2 RNA SPEC QL NAA+PROBE: SIGNIFICANT CHANGE UP

## 2022-01-05 PROCEDURE — G0463: CPT

## 2022-01-05 PROCEDURE — U0003: CPT

## 2022-01-05 PROCEDURE — U0005: CPT

## 2022-01-05 NOTE — H&P PST ADULT - NSICDXPASTMEDICALHX_GEN_ALL_CORE_FT
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

## 2022-01-05 NOTE — H&P PST ADULT - BIRTH SEX
Female No. RUSTAM screening performed.  STOP BANG Legend: 0-2 = LOW Risk; 3-4 = INTERMEDIATE Risk; 5-8 = HIGH Risk

## 2022-01-05 NOTE — H&P PST ADULT - HISTORY OF PRESENT ILLNESS
this is a 75 y/o female who fell on 12/26/21 and fractured left wrist, cast applied in ER and reapplied by surgeon who felt that patient needs surgical repair

## 2022-01-05 NOTE — H&P PST ADULT - NSICDXFAMILYHX_GEN_ALL_CORE_FT
FAMILY HISTORY:  Father  Still living? No  Family history of CHF (congestive heart failure), Age at diagnosis: Age Unknown    Mother  Still living? No  FH: COPD (chronic obstructive pulmonary disease), Age at diagnosis: Age Unknown

## 2022-01-05 NOTE — H&P PST ADULT - PROBLEM SELECTOR PLAN 1
ORIF left distal radius, covid swab done today, preop instructions given, went for medical/cardio clearance, had all blood tests and ekg done with PCP

## 2022-01-05 NOTE — H&P PST ADULT - NSANTHOSAYNRD_GEN_A_CORE
No. BRANDAN screening performed.  STOP BANG Legend: 0-2 = LOW Risk; 3-4 = INTERMEDIATE Risk; 5-8 = HIGH Risk

## 2022-01-06 ENCOUNTER — TRANSCRIPTION ENCOUNTER (OUTPATIENT)
Age: 77
End: 2022-01-06

## 2022-01-06 NOTE — ASU PATIENT PROFILE, ADULT - PAIN CHRONIC, PROFILE
Patient had lab work done on Tuesday and looking for the results.  
Spoke to pt.  Results discussed.  She is needing increased levothyroxine.  Faxed.  Random blood sugar is 269.  This may relate to the recent infection.  A1C ordered and referral is done to Diabetic Educator.  Metformin started 500 bid.  See me in 3 weeks and get another TSH then.  
yes

## 2022-01-06 NOTE — ASU PATIENT PROFILE, ADULT - FALL HARM RISK - RISK INTERVENTIONS

## 2022-01-07 ENCOUNTER — APPOINTMENT (OUTPATIENT)
Dept: ORTHOPEDIC SURGERY | Facility: HOSPITAL | Age: 77
End: 2022-01-07

## 2022-01-07 ENCOUNTER — OUTPATIENT (OUTPATIENT)
Dept: OUTPATIENT SERVICES | Facility: HOSPITAL | Age: 77
LOS: 1 days | End: 2022-01-07
Payer: MEDICARE

## 2022-01-07 VITALS
RESPIRATION RATE: 12 BRPM | DIASTOLIC BLOOD PRESSURE: 57 MMHG | HEART RATE: 89 BPM | OXYGEN SATURATION: 97 % | SYSTOLIC BLOOD PRESSURE: 106 MMHG

## 2022-01-07 VITALS
TEMPERATURE: 98 F | HEART RATE: 84 BPM | OXYGEN SATURATION: 98 % | RESPIRATION RATE: 20 BRPM | SYSTOLIC BLOOD PRESSURE: 132 MMHG | DIASTOLIC BLOOD PRESSURE: 57 MMHG | HEIGHT: 63 IN | WEIGHT: 145.28 LBS

## 2022-01-07 DIAGNOSIS — S52.502A UNSPECIFIED FRACTURE OF THE LOWER END OF LEFT RADIUS, INITIAL ENCOUNTER FOR CLOSED FRACTURE: ICD-10-CM

## 2022-01-07 DIAGNOSIS — Z98.891 HISTORY OF UTERINE SCAR FROM PREVIOUS SURGERY: Chronic | ICD-10-CM

## 2022-01-07 PROCEDURE — 76000 FLUOROSCOPY <1 HR PHYS/QHP: CPT

## 2022-01-07 PROCEDURE — C1889: CPT

## 2022-01-07 PROCEDURE — 25609 OPTX DST RD XART FX/EP SEP3+: CPT | Mod: LT

## 2022-01-07 PROCEDURE — C1713: CPT

## 2022-01-07 DEVICE — IMPLANTABLE DEVICE: Type: IMPLANTABLE DEVICE | Site: LEFT | Status: FUNCTIONAL

## 2022-01-07 DEVICE — SCREW CORTEX LOKG S-T W/ T8 STARDRIVE RECESS 2.7X16MM: Type: IMPLANTABLE DEVICE | Site: LEFT | Status: FUNCTIONAL

## 2022-01-07 DEVICE — SCREW CORTEX LOKG S-T W/ T8 STARDRIVE RECESS 2.7X14MM: Type: IMPLANTABLE DEVICE | Site: LEFT | Status: FUNCTIONAL

## 2022-01-07 DEVICE — SCREW LOKG SLF-TPNG W/ STARDRIVE RECESS 2.X16MM: Type: IMPLANTABLE DEVICE | Site: LEFT | Status: FUNCTIONAL

## 2022-01-07 DEVICE — SCREW LOKG SLF-TPNG W/ STARDRIVE RECESS 2.X20MM: Type: IMPLANTABLE DEVICE | Site: LEFT | Status: FUNCTIONAL

## 2022-01-07 DEVICE — SCREW CORTEX LOKG S-T W/ T8 STARDRIVE RECESS 2.7X24MM: Type: IMPLANTABLE DEVICE | Site: LEFT | Status: FUNCTIONAL

## 2022-01-07 DEVICE — WIRE K TRC PT 1.25X150MM: Type: IMPLANTABLE DEVICE | Site: LEFT | Status: FUNCTIONAL

## 2022-01-07 DEVICE — SCREW LOKG SLF-TPNG W/ STARDRIVE RECESS 2.X18MM: Type: IMPLANTABLE DEVICE | Site: LEFT | Status: FUNCTIONAL

## 2022-01-07 DEVICE — SCREW LOKG SLF-TPNG W/ STARDRIVE RECESS 2.X22MM: Type: IMPLANTABLE DEVICE | Site: LEFT | Status: FUNCTIONAL

## 2022-01-07 RX ORDER — APREPITANT 80 MG/1
40 CAPSULE ORAL ONCE
Refills: 0 | Status: COMPLETED | OUTPATIENT
Start: 2022-01-07 | End: 2022-01-07

## 2022-01-07 RX ORDER — CHLORHEXIDINE GLUCONATE 213 G/1000ML
1 SOLUTION TOPICAL ONCE
Refills: 0 | Status: COMPLETED | OUTPATIENT
Start: 2022-01-07 | End: 2022-01-07

## 2022-01-07 RX ORDER — CEFAZOLIN SODIUM 1 G
2000 VIAL (EA) INJECTION ONCE
Refills: 0 | Status: COMPLETED | OUTPATIENT
Start: 2022-01-07 | End: 2022-01-07

## 2022-01-07 RX ORDER — SODIUM CHLORIDE 9 MG/ML
1000 INJECTION, SOLUTION INTRAVENOUS
Refills: 0 | Status: DISCONTINUED | OUTPATIENT
Start: 2022-01-07 | End: 2022-01-07

## 2022-01-07 RX ORDER — ACETAMINOPHEN WITH CODEINE 300MG-30MG
1 TABLET ORAL
Qty: 10 | Refills: 0
Start: 2022-01-07

## 2022-01-07 RX ORDER — ONDANSETRON 8 MG/1
4 TABLET, FILM COATED ORAL ONCE
Refills: 0 | Status: DISCONTINUED | OUTPATIENT
Start: 2022-01-07 | End: 2022-01-07

## 2022-01-07 RX ORDER — HYDROMORPHONE HYDROCHLORIDE 2 MG/ML
0.5 INJECTION INTRAMUSCULAR; INTRAVENOUS; SUBCUTANEOUS
Refills: 0 | Status: DISCONTINUED | OUTPATIENT
Start: 2022-01-07 | End: 2022-01-07

## 2022-01-07 RX ORDER — OXYCODONE HYDROCHLORIDE 5 MG/1
5 TABLET ORAL ONCE
Refills: 0 | Status: DISCONTINUED | OUTPATIENT
Start: 2022-01-07 | End: 2022-01-07

## 2022-01-07 RX ADMIN — APREPITANT 40 MILLIGRAM(S): 80 CAPSULE ORAL at 08:38

## 2022-01-07 RX ADMIN — CHLORHEXIDINE GLUCONATE 1 APPLICATION(S): 213 SOLUTION TOPICAL at 08:38

## 2022-01-07 RX ADMIN — SODIUM CHLORIDE 75 MILLILITER(S): 9 INJECTION, SOLUTION INTRAVENOUS at 10:05

## 2022-01-07 NOTE — ASU DISCHARGE PLAN (ADULT/PEDIATRIC) - BATHING
cover with plastic bag to keep DRY/Shower only cover with plastic bag to keep DRY/Shower only/Do not submerge in water

## 2022-01-07 NOTE — ASU DISCHARGE PLAN (ADULT/PEDIATRIC) - ASU DC SPECIAL INSTRUCTIONSFT
Elevate  Left  arm in sling daily when up & walking.  Elevate the  hand/arm above heart level on pillow/blankets/ posey foam block when lying down.  Pad the neck strap with athletic sock/collared shirt.  Apply ice packs to top of  Left   hand for 20 min on and off  Keep bandage clean, dry , & intact until seen in office  May open & close the fingers of the operated arm every hour for exercise.  Call the Dr.  for fever, severe pain, fall or hand injury.  Call for an appointment for office visit  in 10 days.

## 2022-01-07 NOTE — ASU DISCHARGE PLAN (ADULT/PEDIATRIC) - NS MD DC FALL RISK RISK
For information on Fall & Injury Prevention, visit: [email or web address] OR  [email or web address] OR  https://www.cdc.gov/steadi/patient.html

## 2022-01-07 NOTE — ASU DISCHARGE PLAN (ADULT/PEDIATRIC) - CARE PROVIDER_API CALL
Scott Holden)  Orthopaedic Surgery  [address]  [place] [address]  Phone: [phone]  Fax: [phone]  Follow Up Time:

## 2022-01-07 NOTE — BRIEF OPERATIVE NOTE - NSICDXBRIEFPROCEDURE_GEN_ALL_CORE_FT
PROCEDURES:  ORIF, fracture, radius, distal, extra-articular 07-Jan-2022 10:04:31 left Brittni Quintana

## 2022-01-07 NOTE — ASU DISCHARGE PLAN (ADULT/PEDIATRIC) - ACTIVITY LEVEL
sling/splint/posey foam/Elevate extremity sling/splint/posey foam/No heavy lifting/No sports/gym/Elevate extremity

## 2022-01-10 PROBLEM — K21.9 GASTRO-ESOPHAGEAL REFLUX DISEASE WITHOUT ESOPHAGITIS: Chronic | Status: ACTIVE | Noted: 2022-01-05

## 2022-01-10 PROBLEM — Z86.73 PERSONAL HISTORY OF TRANSIENT ISCHEMIC ATTACK (TIA), AND CEREBRAL INFARCTION WITHOUT RESIDUAL DEFICITS: Chronic | Status: ACTIVE | Noted: 2022-01-05

## 2022-01-17 ENCOUNTER — APPOINTMENT (OUTPATIENT)
Dept: ORTHOPEDIC SURGERY | Facility: CLINIC | Age: 77
End: 2022-01-17
Payer: MEDICARE

## 2022-01-17 PROCEDURE — 29075 APPL CST ELBW FNGR SHORT ARM: CPT | Mod: 58,LT

## 2022-01-17 PROCEDURE — 73110 X-RAY EXAM OF WRIST: CPT | Mod: LT

## 2022-01-17 PROCEDURE — 99024 POSTOP FOLLOW-UP VISIT: CPT

## 2022-01-17 NOTE — END OF VISIT
[FreeTextEntry3] : All medical record entries made by the Scribe were at my,  Dr. Scott Holden MD., direction and personally dictated by me on 01/17/2022. I have personally reviewed the chart and agree that the record accurately reflects my personal performance of the history, physical exam, assessment and plan.

## 2022-01-17 NOTE — ADDENDUM
[FreeTextEntry1] : I, Yari Martino wrote this note acting as a scribe for Dr. Scott Holden on Jan 17, 2022.

## 2022-01-17 NOTE — HISTORY OF PRESENT ILLNESS
[de-identified] : s/p Open reduction internal fixation of left distal radius and application of short arm splint. [de-identified] : The patient is a 76 year female who returns for the 1st postoperative visit after undergoing Open reduction internal fixation of left distal radius and application of short arm splint  at [facility]. The surgery was on 01/17/2022. The patient is recovering at home. She reports a great deal of postoperative pain. Reports normal sensation to the digits. She not able to take Oxycodone due to stomach issues. She took Tylenol for pain. [de-identified] : Patient is WDWN, alert, and in no acute distress. Breathing is unlabored. She is grossly oriented to person, place, and time.\par \par She is accompanied by her daughter today.\par \par \par Dissolvable sutures in place volarly to the distal radius.\par Incision site is healing well, without signs of postoperative infection.\par Normal amount of postoperative edema and tenderness are present.\par Full sensation noted the digits.  [de-identified] : AP, lateral and oblique views of the LEFT wrist were obtained today and revealed a distal radius fracture stabilized by Synthes locked volar distal radius plate. [de-identified] : A short arm cast was placed to the left wrist on 1/17/22.\par Proper cast care instructions reviewed with the patient and her daughter.\par She was instructed on ROM exercises of the shoulder, elbow and hand while casted.\par She was additionally instructed to pump the hand while in the cast to reduce edema. \par She may utilize the hand normally while casted according to her pain.\par Tylenol prn.\par Follow up in 4 weeks for cast removal and repeat xrays.

## 2022-02-07 ENCOUNTER — APPOINTMENT (OUTPATIENT)
Dept: ORTHOPEDIC SURGERY | Facility: CLINIC | Age: 77
End: 2022-02-07
Payer: MEDICARE

## 2022-02-07 PROCEDURE — 99024 POSTOP FOLLOW-UP VISIT: CPT

## 2022-02-07 PROCEDURE — 73110 X-RAY EXAM OF WRIST: CPT | Mod: LT

## 2022-02-08 NOTE — HISTORY OF PRESENT ILLNESS
[de-identified] : s/p Open reduction internal fixation of left distal radius and application of short arm splint. [de-identified] : The patient is a 76 year female who returns for the 2nd postoperative visit after undergoing Open reduction internal fixation of left distal radius and application of short arm splint  at [facility]. The surgery was on 01/07/2022. She returns on 2/7/22 and reports continued pain to the the wrist and hand. Notes gross edema present and complains of the cast being too tight. She presents 1 week early to discuss treatment options given the discomfort to the cast.  [de-identified] : Patient is WDWN, alert, and in no acute distress. Breathing is unlabored. She is grossly oriented to person, place, and time.\par \par She is accompanied by her  today.\par \par Patient presents in a left short arm cast.\par Cast was adjusted given the patient's discomfort\par Digits are moving freely with brisk capillary refill.\par Full sensation noted the digits.  [de-identified] : AP, lateral and oblique views of the LEFT wrist were obtained today and revealed a distal radius fracture stabilized by Synthes locked volar distal radius plate. [de-identified] : The cast was bivalved and then trimmed given the patient's discomfort. Moleskin was applied.\par She was advised to continue with ROM exercises of the digits while casted. \par She may use the hand normally for ADLs as tolerated by pain.\par Follow up in 2 weeks for cast removal and repeat xrays.

## 2022-02-09 ENCOUNTER — APPOINTMENT (OUTPATIENT)
Dept: NEUROLOGY | Facility: CLINIC | Age: 77
End: 2022-02-09

## 2022-02-24 ENCOUNTER — APPOINTMENT (OUTPATIENT)
Dept: ORTHOPEDIC SURGERY | Facility: CLINIC | Age: 77
End: 2022-02-24
Payer: MEDICARE

## 2022-02-24 PROCEDURE — 99024 POSTOP FOLLOW-UP VISIT: CPT

## 2022-02-24 PROCEDURE — 29125 APPL SHORT ARM SPLINT STATIC: CPT | Mod: 58,LT

## 2022-02-24 PROCEDURE — 73110 X-RAY EXAM OF WRIST: CPT | Mod: LT

## 2022-02-24 NOTE — HISTORY OF PRESENT ILLNESS
[de-identified] : s/p Open reduction internal fixation of left distal radius and application of short arm splint. [de-identified] : The patient is a 76 year female who returns for the 3rd postoperative visit after undergoing Open reduction internal fixation of left distal radius and application of short arm splint  at [facility]. The surgery was on 01/07/2022. She returns on 2/24/22 for cast removal and repeat xrays. She states the hand and wrist are still quite swollen and she has been in a great deal of pain even while in the cast. Her pain has persisted in office today since the cast was removed.  [de-identified] : Patient is WDWN, alert, and in no acute distress. Breathing is unlabored. She is grossly oriented to person, place, and time.\par \par She is accompanied by her daughter today.\par \par Patient presents in a left short arm cast which was removed in office on 2/24/22.\par Limitations of wrist flexion and extension due to pain.\par Gross limitations of motion to the digits into flexion and extension due to swelling as well as lack of use while the patient was casted.\par Sensation to the digits intact. [de-identified] : AP, lateral and oblique views of the LEFT wrist were obtained today and revealed a distal radius fracture stabilized by Synthes locked volar distal radius plate. Hardware is well aligned and the fracture is healed. [de-identified] : The left short arm cast was removed in office today on 2/24/22.\par Xrays reviewed with the patient and her daughter.\par She was placed into a removable well molded fiber glass splint. I did tell her she may also use a Velcro wrist brace.\par The digits were wrapped with Coban into forced flexion to promote stretching as she is quite limited due to lack of use.\par She was encouraged to use the hand for ADLs.\par She was instructed on soaking the hand in warm water and Epsom salts as well as well as on ROM exercises of the digits.\par The patient wishes to proceed with hand therapy of the left hand and wrist. A script was given.\par Follow up in 6 weeks for repeat xrays.

## 2022-02-24 NOTE — ADDENDUM
[FreeTextEntry1] : I, Yari Martino wrote this note acting as a scribe for Dr. Scott Holden on Feb 24, 2022.

## 2022-04-11 ENCOUNTER — APPOINTMENT (OUTPATIENT)
Dept: ORTHOPEDIC SURGERY | Facility: CLINIC | Age: 77
End: 2022-04-11
Payer: MEDICARE

## 2022-04-11 DIAGNOSIS — S52.502D UNSPECIFIED FRACTURE OF THE LOWER END OF LEFT RADIUS, SUBSEQUENT ENCOUNTER FOR CLOSED FRACTURE WITH ROUTINE HEALING: ICD-10-CM

## 2022-04-11 PROCEDURE — 73110 X-RAY EXAM OF WRIST: CPT | Mod: LT

## 2022-04-11 PROCEDURE — 99213 OFFICE O/P EST LOW 20 MIN: CPT

## 2022-04-11 NOTE — PHYSICAL EXAM
[de-identified] : Patient is WDWN, alert, and in no acute distress. Breathing is unlabored. She is grossly oriented to person, place, and time.\par \par She is accompanied by her daughter today.\par \par Patient presents in a left short arm cast which was removed in office on 2/24/22.\par Wrist Flexion: 10\par Wrist Extension: 10\par Improved flexion and extension of the digits, albeit with a lack of terminal flexion. 1 cm distance between the tips of the fingers and the palm.\par Full passive ROM to the digits\par Sensation to the digits intact.  [de-identified] : AP, lateral and oblique views of the LEFT wrist were obtained today and revealed a distal radius fracture stabilized by Synthes locked volar distal radius plate. Hardware is well aligned and the fracture is healed. Pantrapezial arthritis present.

## 2022-04-11 NOTE — DISCUSSION/SUMMARY
[FreeTextEntry1] : The underlying pathophysiology was reviewed with the patient. XR films were reviewed with the patient. Discussed at length the nature of the patient’s condition. \par \par She was instructed on the use of topical modalities such as lidocaine patches and Salonpas. \par She was advised to use the hand for ADLs as I discussed with her that this is the best way to regain full function of the hand and wrist. \par She will continue with hand therapy and was provided with an updated referral for the left hand and wrist.\par \par All questions answered, understanding verbalized. Patient in agreement with plan of care. Follow up in 3 months for repeat xrays.

## 2022-04-11 NOTE — END OF VISIT
[FreeTextEntry3] : All medical record entries made by the Scribe were at my,  Dr. Scott Holden MD., direction and personally dictated by me on 04/11/2022. I have personally reviewed the chart and agree that the record accurately reflects my personal performance of the history, physical exam, assessment and plan.

## 2022-04-11 NOTE — HISTORY OF PRESENT ILLNESS
[FreeTextEntry1] : The patient is a 76 year female who returns for a follow up visit after undergoing Open reduction internal fixation of left distal radius and application of short arm splint at [facility]. The surgery was on 01/07/2022. She returns on 4/11/22 and reports be in hand therapy three times per week. She will be decreasing to twice weekly beginning this week. She reports increases in flexion overall but notes continued weakness to the hand/wrist as well as stiffness. She reports pain radially and ulnarly along the wrist. She reports to be soaking the hand in warm water and Epsom salts to alleviate stiffness to the hand and wrist. \par \par She is on Plavix, cannot take NSAIDs.

## 2022-04-11 NOTE — ADDENDUM
[FreeTextEntry1] : I, Yari Martino wrote this note acting as a scribe for Dr. Scott Holden on Apr 11, 2022.

## 2022-04-11 NOTE — REASON FOR VISIT
[Follow-Up Visit] : a follow-up visit for [FreeTextEntry2] : s/p Open reduction internal fixation of left distal radius and application of short arm splint.

## 2022-06-06 ENCOUNTER — TRANSCRIPTION ENCOUNTER (OUTPATIENT)
Age: 77
End: 2022-06-06

## 2022-06-23 ENCOUNTER — APPOINTMENT (OUTPATIENT)
Dept: MRI IMAGING | Facility: CLINIC | Age: 77
End: 2022-06-23
Payer: MEDICARE

## 2022-06-23 ENCOUNTER — OUTPATIENT (OUTPATIENT)
Dept: OUTPATIENT SERVICES | Facility: HOSPITAL | Age: 77
LOS: 1 days | End: 2022-06-23
Payer: MEDICARE

## 2022-06-23 DIAGNOSIS — Z98.891 HISTORY OF UTERINE SCAR FROM PREVIOUS SURGERY: Chronic | ICD-10-CM

## 2022-06-23 DIAGNOSIS — G93.40 ENCEPHALOPATHY, UNSPECIFIED: ICD-10-CM

## 2022-06-23 PROCEDURE — A9585: CPT

## 2022-06-23 PROCEDURE — 70553 MRI BRAIN STEM W/O & W/DYE: CPT | Mod: 26,MH

## 2022-06-23 PROCEDURE — 70553 MRI BRAIN STEM W/O & W/DYE: CPT

## 2022-06-24 ENCOUNTER — NON-APPOINTMENT (OUTPATIENT)
Age: 77
End: 2022-06-24

## 2022-06-24 ENCOUNTER — TRANSCRIPTION ENCOUNTER (OUTPATIENT)
Age: 77
End: 2022-06-24

## 2022-07-11 ENCOUNTER — APPOINTMENT (OUTPATIENT)
Dept: ORTHOPEDIC SURGERY | Facility: CLINIC | Age: 77
End: 2022-07-11

## 2022-09-26 ENCOUNTER — APPOINTMENT (OUTPATIENT)
Dept: PULMONOLOGY | Facility: CLINIC | Age: 77
End: 2022-09-26

## 2022-09-26 VITALS
TEMPERATURE: 97.7 F | WEIGHT: 149 LBS | HEART RATE: 87 BPM | SYSTOLIC BLOOD PRESSURE: 132 MMHG | OXYGEN SATURATION: 98 % | DIASTOLIC BLOOD PRESSURE: 80 MMHG | BODY MASS INDEX: 25.58 KG/M2

## 2022-09-26 PROCEDURE — 99214 OFFICE O/P EST MOD 30 MIN: CPT

## 2022-09-26 NOTE — DISCUSSION/SUMMARY
[FreeTextEntry1] : She is a 77 year-old, former smoker (stopped in 2016) with moderate COPD. \par \par She has been followed for pulmonary nodules. CT from 4/23/18 showed tree in bud nodules with a ground glass nodule in the RUL. Atelectasis in lingula and RML present. CT Chest 8/5/19: No suspicious pulmonary nodules were noted. Emphysema was present. Evidence of airways inflammation was also noted. Heart size was normal. CT Chest 6/10/21: Branching tubular opacity in the right upper lobe consistent with mucoid impaction.\par \par Her COPD remains stable. She was advised to continue with Spiriva and Symbicort.\par \par She had a PET/CT done a few months ago at an outside facility and showed me the report on her phone.  She did not have any nodules.  The official report will be obtained.\par \par I will see her again in 6 months.

## 2022-09-26 NOTE — REVIEW OF SYSTEMS
[Fever] : no fever [Chills] : no chills [Fatigue] : no fatigue [Nasal Congestion] : no nasal congestion [Cough] : no cough [Sputum] : not coughing up ~M sputum [Dyspnea] : no dyspnea [Chest Tightness] : no chest tightness [Wheezing] : no wheezing [Chest Discomfort] : no chest discomfort [PND] : no PND [Edema] : ~T edema was not present [Heartburn] : no heartburn [Myalgias] : no myalgias [Arthralgias] : no arthralgias [Rash] : no [unfilled] rash [Anemia] : no anemia [Anxiety] : no anxiety [Diabetes] : no diabetes mellitus [Thyroid Problem] : no thyroid problem [DVT] : no DVT

## 2022-09-26 NOTE — HISTORY OF PRESENT ILLNESS
[Former] : former [TextBox_4] : The patient came for a scheduled follow up visit today. \par \par No complaint of cough, wheezing or shortness of breath. \par \par Was diagnosed with NHL. Has been seeing medical oncology. \par \par  [YearQuit] : 2016 [Difficulty Initiating Sleep] : does not have difficulty initiating sleep [Difficulty Maintaining Sleep] : does not have difficulty maintaining sleep

## 2022-09-26 NOTE — PROCEDURE
[FreeTextEntry1] : PFT 5/16/19: Mild obstructive airways disease was noted. Lung volumes were normal. Diffusion was mildly reduced. No significant improvement was present after inhalation of bronchodilator.\par \par CT Chest 4/23/18: Tree in bud opacities in the right upper lobe and a ground glass opacity in the right upper lobe was noted. Mild emphysematous changes were noted.\par \par CT Chest 8/5/19: No suspicious pulmonary nodules were noted. Emphysema was present. Evidence of airways inflammation was also noted. Heart size was normal.\par \par CT Chest 6/10/21: Branching tubular opacity in the right upper lobe consistent with mucoid impaction. 2

## 2022-10-03 ENCOUNTER — APPOINTMENT (OUTPATIENT)
Dept: ORTHOPEDIC SURGERY | Facility: CLINIC | Age: 77
End: 2022-10-03

## 2022-10-03 DIAGNOSIS — S52.502A UNSPECIFIED FRACTURE OF THE LOWER END OF LEFT RADIUS, INITIAL ENCOUNTER FOR CLOSED FRACTURE: ICD-10-CM

## 2022-10-03 PROCEDURE — 99213 OFFICE O/P EST LOW 20 MIN: CPT | Mod: 25

## 2022-10-03 PROCEDURE — 73110 X-RAY EXAM OF WRIST: CPT | Mod: LT

## 2022-10-03 PROCEDURE — 20600 DRAIN/INJ JOINT/BURSA W/O US: CPT | Mod: LT

## 2022-10-04 NOTE — END OF VISIT
[FreeTextEntry3] : All medical record entries made by the Scribe were at my,  Dr. Scott Holden MD., direction and personally dictated by me on 10/03/2022. I have personally reviewed the chart and agree that the record accurately reflects my personal performance of the history, physical exam, assessment and plan.

## 2022-10-04 NOTE — ADDENDUM
[FreeTextEntry1] : I, Yari Martino wrote this note acting as a scribe for Dr. Scott Holden on Oct 03, 2022.

## 2022-10-04 NOTE — PHYSICAL EXAM
[de-identified] : Patient is WDWN, alert, and in no acute distress. Breathing is unlabored. She is grossly oriented to person, place, and time.\par \par Left Wrist/Hand:\par Improvements in flexion and extension of the wrist since her last office visit.\par Improved flexion and extension of the digits, albeit with a lack of terminal flexion. 1 cm distance between the tips of the fingers and the palm.\par Full passive ROM to the digits\par Sensation to the digits intact. \par Basal joint tenderness. Positive grind test. [de-identified] : AP, lateral and oblique views of the LEFT wrist were obtained today and revealed a distal radius fracture stabilized by Synthes locked volar distal radius plate. Hardware is well aligned and the fracture is healed. Pantrapezial arthritis present.

## 2022-10-04 NOTE — DISCUSSION/SUMMARY
[de-identified] : The underlying pathophysiology was reviewed with the patient. XR films were reviewed with the patient. Discussed at length the nature of the patient’s condition. \par \par With regard to the left wrist, I told her that maximum improvement may take up to 1 year and that I would expect her to continue to improve with time. She was advised to continue to use the hand for all ADLs as tolerated. I recommended continued flexion and extension exercises of the digits and wrist.\par \par With regard to the left hand, I recommended a cortisone injection at the left thumb CMC joint.\par The patient wishes to proceed with a cortisone injection at this time. Under sterile precautions, an injection of 0.5 cc 1% lidocaine with 0.25 cc of Kenalog and 0.25 cc of Dexamethasone was administered into the LEFT basal joint (1). The patient tolerated the procedure well. Rest and apply ice. \par \par All questions answered, understanding verbalized. Patient in agreement with plan of care. Follow up as needed.

## 2022-10-20 ENCOUNTER — OUTPATIENT (OUTPATIENT)
Dept: OUTPATIENT SERVICES | Facility: HOSPITAL | Age: 77
LOS: 1 days | End: 2022-10-20
Payer: MEDICARE

## 2022-10-20 ENCOUNTER — APPOINTMENT (OUTPATIENT)
Dept: PULMONOLOGY | Facility: CLINIC | Age: 77
End: 2022-10-20

## 2022-10-20 ENCOUNTER — APPOINTMENT (OUTPATIENT)
Dept: RADIOLOGY | Facility: CLINIC | Age: 77
End: 2022-10-20

## 2022-10-20 VITALS
SYSTOLIC BLOOD PRESSURE: 130 MMHG | BODY MASS INDEX: 25.4 KG/M2 | DIASTOLIC BLOOD PRESSURE: 76 MMHG | OXYGEN SATURATION: 99 % | WEIGHT: 148 LBS | HEART RATE: 94 BPM | TEMPERATURE: 97.1 F

## 2022-10-20 DIAGNOSIS — Z98.891 HISTORY OF UTERINE SCAR FROM PREVIOUS SURGERY: Chronic | ICD-10-CM

## 2022-10-20 DIAGNOSIS — R05.9 COUGH, UNSPECIFIED: ICD-10-CM

## 2022-10-20 DIAGNOSIS — U07.1 COVID-19: ICD-10-CM

## 2022-10-20 PROCEDURE — 71046 X-RAY EXAM CHEST 2 VIEWS: CPT | Mod: 26

## 2022-10-20 PROCEDURE — 71046 X-RAY EXAM CHEST 2 VIEWS: CPT

## 2022-10-20 PROCEDURE — 99214 OFFICE O/P EST MOD 30 MIN: CPT | Mod: CS

## 2022-10-20 NOTE — HISTORY OF PRESENT ILLNESS
[Former] : former [TextBox_4] : She was diagnosed with COVID on October 10.  She was seen in the emergency room at [place].  Was given monoclonal antibody.  Has been feeling better but she is coughing.  No shortness of breath.  No chest pain.  No fever.\par \par  [YearQuit] : 2016 [Difficulty Initiating Sleep] : does not have difficulty initiating sleep [Difficulty Maintaining Sleep] : does not have difficulty maintaining sleep [Fatigue] : no fatigue

## 2022-10-20 NOTE — PHYSICAL EXAM
[General Appearance - In No Acute Distress] : no acute distress [Neck Cervical Mass (___cm)] : no neck mass was observed [Heart Sounds] : normal S1 and S2 [Abnormal Walk] : normal gait [Nail Clubbing] : no clubbing of the fingernails [Cyanosis, Localized] : no localized cyanosis [No Focal Deficits] : no focal deficits [Oriented To Time, Place, And Person] : oriented to person, place, and time [Normal Appearance] : normal appearance [] : no respiratory distress [Impaired Insight] : insight and judgment were intact [FreeTextEntry1] : Scattered rhonchi were noted bilaterally.  There was no wheezing.

## 2022-10-20 NOTE — REVIEW OF SYSTEMS
[Cough] : cough [Sputum] : sputum  [Fever] : no fever [Chills] : no chills [Fatigue] : no fatigue [Nasal Congestion] : no nasal congestion [Hemoptysis] : no hemoptysis [Dyspnea] : no dyspnea [Wheezing] : no wheezing [Chest Discomfort] : no chest discomfort [PND] : no PND [Edema] : ~T edema was not present [Heartburn] : no heartburn [Myalgias] : no myalgias [Arthralgias] : no arthralgias [Rash] : no [unfilled] rash [Anemia] : no anemia [Anxiety] : no anxiety [Diabetes] : no diabetes mellitus [Thyroid Problem] : no thyroid problem [DVT] : no DVT

## 2022-10-20 NOTE — PROCEDURE
[FreeTextEntry1] : PFT 5/16/19: Mild obstructive airways disease was noted. Lung volumes were normal. Diffusion was mildly reduced. No significant improvement was present after inhalation of bronchodilator.\par \par CT Chest 4/23/18: Tree in bud opacities in the right upper lobe and a ground glass opacity in the right upper lobe was noted. Mild emphysematous changes were noted.\par \par CT Chest 8/5/19: No suspicious pulmonary nodules were noted. Emphysema was present. Evidence of airways inflammation was also noted. Heart size was normal.\par \par CT Chest 6/10/21: Branching tubular opacity in the right upper lobe consistent with mucoid impaction.

## 2022-10-20 NOTE — DISCUSSION/SUMMARY
[FreeTextEntry1] : She is a 77 year-old, former smoker (stopped in 2016) with a history of non-Hodgkin's lymphoma and moderate COPD. She has been followed for pulmonary nodules. \par \par CT from 4/23/18 showed tree in bud nodules with a ground glass nodule in the RUL. Atelectasis in lingula and RML present. CT Chest 8/5/19: No suspicious pulmonary nodules were noted. Emphysema was present. Evidence of airways inflammation was also noted. Heart size was normal. CT Chest 6/10/21: Branching tubular opacity in the right upper lobe consistent with mucoid impaction.  PET/CT from May 2022 did not report any "significant" lung nodules.\par \par She was diagnosed with COVID on 10/10/2022.  She was given monoclonal antibodies at [facility].  She has a persistent cough.  Her examination suggest bronchitis.  She was given Zithromax and a course of prednisone.  A chest radiograph will be obtained.  Further recommendations to follow her response to treatment and the results of the x-ray.  Follow-up in 1 month advised.

## 2022-10-24 ENCOUNTER — NON-APPOINTMENT (OUTPATIENT)
Age: 77
End: 2022-10-24

## 2022-12-22 ENCOUNTER — APPOINTMENT (OUTPATIENT)
Dept: PULMONOLOGY | Facility: CLINIC | Age: 77
End: 2022-12-22

## 2022-12-22 VITALS
BODY MASS INDEX: 25.75 KG/M2 | TEMPERATURE: 96.8 F | SYSTOLIC BLOOD PRESSURE: 140 MMHG | WEIGHT: 150 LBS | OXYGEN SATURATION: 99 % | HEART RATE: 91 BPM | DIASTOLIC BLOOD PRESSURE: 80 MMHG

## 2022-12-22 PROCEDURE — 99214 OFFICE O/P EST MOD 30 MIN: CPT

## 2022-12-22 NOTE — HISTORY OF PRESENT ILLNESS
[Former] : former [TextBox_4] : She was diagnosed with COVID on October 10.  She was seen in the emergency room at [place].  Was given monoclonal antibody.\par \par Has had a dry cough, for about two days. Had a negative COVID and Flu test a few days ago. No fever, chills or sweats. No sick contacts. \par Getting chemotherapy for NHL. \par \par On Spiriva HH. Not on any other inhalers. \par \par  [Difficulty Initiating Sleep] : does not have difficulty initiating sleep [Difficulty Maintaining Sleep] : does not have difficulty maintaining sleep [Fatigue] : no fatigue

## 2022-12-22 NOTE — REVIEW OF SYSTEMS
[Cough] : cough [Fever] : no fever [Chills] : no chills [Fatigue] : no fatigue [Nasal Congestion] : no nasal congestion [Sputum] : not coughing up ~M sputum [Hemoptysis] : no hemoptysis [Dyspnea] : no dyspnea [Chest Tightness] : no chest tightness [Wheezing] : no wheezing [Chest Discomfort] : no chest discomfort [PND] : no PND [Edema] : ~T edema was not present [Heartburn] : no heartburn [Myalgias] : no myalgias [Arthralgias] : no arthralgias [Rash] : no [unfilled] rash [Anemia] : no anemia [Anxiety] : no anxiety [Diabetes] : no diabetes mellitus [Thyroid Problem] : no thyroid problem [DVT] : no DVT

## 2022-12-22 NOTE — DISCUSSION/SUMMARY
[FreeTextEntry1] : She is a 77 year-old, former smoker (stopped in 2016) with a history of non-Hodgkin's lymphoma, moderate COPD and pulmonary nodules. \par \singh Has had multiple chest CTs dating back to 2012. Chest CT from 4/23/18 showed tree in bud nodules with a ground glass nodule in the RUL. Atelectasis in lingula and RML present. CT Chest 8/5/19 no suspicious pulmonary nodules were noted. Emphysema was present. Evidence of airways inflammation was also noted. Heart size was normal. CT Chest 6/10/21 branching tubular opacity in the right upper lobe consistent with mucoid impaction.  PET/CT from May 2022 did not report any "significant" lung nodules.\par \par She was diagnosed with COVID on 10/10/2022.  She was given monoclonal antibodies at [place].  \par \singh Has had a cough. Started after COVID. Will give a trial of Trelegy. \par \par To call me if not feeling better. Follow up in one month. \par \par \par NB -called [facility] radiology to try and get CT reports.  There will not forward any reports without patient signing a separate release.

## 2022-12-22 NOTE — PROCEDURE
[FreeTextEntry1] : PFT 5/16/19: Mild obstructive airways disease was noted. Lung volumes were normal. Diffusion was mildly reduced. No significant improvement was present after inhalation of bronchodilator.\par \par CT Chest 4/23/18: Tree in bud opacities in the right upper lobe and a ground glass opacity in the right upper lobe was noted. Mild emphysematous changes were noted.\par \par CT Chest 8/5/19: No suspicious pulmonary nodules were noted. Emphysema was present. Evidence of airways inflammation was also noted. Heart size was normal.\par \par CT Chest 6/10/21: Branching tubular opacity in the right upper lobe consistent with mucoid impaction.\par \par CT Abdomen 11/3/22: Emphysematous changes noted at both lung bases.  Similar to prior study of November 2021.

## 2022-12-22 NOTE — PHYSICAL EXAM
[Normal Appearance] : normal appearance [General Appearance - In No Acute Distress] : no acute distress [Neck Cervical Mass (___cm)] : no neck mass was observed [Heart Sounds] : normal S1 and S2 [Abnormal Walk] : normal gait [Nail Clubbing] : no clubbing of the fingernails [Cyanosis, Localized] : no localized cyanosis [] : no rash [No Focal Deficits] : no focal deficits [Oriented To Time, Place, And Person] : oriented to person, place, and time [Impaired Insight] : insight and judgment were intact [Well Groomed] : well groomed [Neck Appearance] : the appearance of the neck was normal [Heart Rate And Rhythm] : heart rate and rhythm were normal [Auscultation Breath Sounds / Voice Sounds] : lungs were clear to auscultation bilaterally [Abdomen Tenderness] : non-tender

## 2023-01-09 RX ORDER — AZITHROMYCIN 250 MG/1
250 TABLET, FILM COATED ORAL
Qty: 1 | Refills: 0 | Status: DISCONTINUED | COMMUNITY
Start: 2022-10-20 | End: 2023-01-09

## 2023-01-09 RX ORDER — PREDNISONE 20 MG/1
20 TABLET ORAL DAILY
Qty: 10 | Refills: 1 | Status: DISCONTINUED | COMMUNITY
Start: 2022-10-20 | End: 2023-01-09

## 2023-01-10 ENCOUNTER — APPOINTMENT (OUTPATIENT)
Dept: CT IMAGING | Facility: CLINIC | Age: 78
End: 2023-01-10
Payer: MEDICARE

## 2023-01-10 ENCOUNTER — OUTPATIENT (OUTPATIENT)
Dept: OUTPATIENT SERVICES | Facility: HOSPITAL | Age: 78
LOS: 1 days | End: 2023-01-10
Payer: MEDICARE

## 2023-01-10 DIAGNOSIS — R91.1 SOLITARY PULMONARY NODULE: ICD-10-CM

## 2023-01-10 DIAGNOSIS — Z98.891 HISTORY OF UTERINE SCAR FROM PREVIOUS SURGERY: Chronic | ICD-10-CM

## 2023-01-10 PROCEDURE — 71250 CT THORAX DX C-: CPT | Mod: 26,MH

## 2023-01-10 PROCEDURE — 71250 CT THORAX DX C-: CPT

## 2023-01-14 ENCOUNTER — APPOINTMENT (OUTPATIENT)
Dept: NUCLEAR MEDICINE | Facility: IMAGING CENTER | Age: 78
End: 2023-01-14
Payer: MEDICARE

## 2023-01-14 ENCOUNTER — OUTPATIENT (OUTPATIENT)
Dept: OUTPATIENT SERVICES | Facility: HOSPITAL | Age: 78
LOS: 1 days | End: 2023-01-14
Payer: MEDICARE

## 2023-01-14 DIAGNOSIS — Z98.891 HISTORY OF UTERINE SCAR FROM PREVIOUS SURGERY: Chronic | ICD-10-CM

## 2023-01-14 DIAGNOSIS — R91.1 SOLITARY PULMONARY NODULE: ICD-10-CM

## 2023-01-14 PROCEDURE — 78815 PET IMAGE W/CT SKULL-THIGH: CPT | Mod: 26,PI,MH

## 2023-01-14 PROCEDURE — A9552: CPT

## 2023-01-14 PROCEDURE — 78815 PET IMAGE W/CT SKULL-THIGH: CPT

## 2023-01-25 ENCOUNTER — APPOINTMENT (OUTPATIENT)
Dept: PULMONOLOGY | Facility: CLINIC | Age: 78
End: 2023-01-25
Payer: MEDICARE

## 2023-01-25 ENCOUNTER — NON-APPOINTMENT (OUTPATIENT)
Age: 78
End: 2023-01-25

## 2023-01-25 VITALS
SYSTOLIC BLOOD PRESSURE: 122 MMHG | TEMPERATURE: 97.4 F | OXYGEN SATURATION: 96 % | BODY MASS INDEX: 25.27 KG/M2 | RESPIRATION RATE: 16 BRPM | WEIGHT: 148 LBS | HEIGHT: 64 IN | DIASTOLIC BLOOD PRESSURE: 80 MMHG | HEART RATE: 107 BPM

## 2023-01-25 DIAGNOSIS — F17.200 NICOTINE DEPENDENCE, UNSPECIFIED, UNCOMPLICATED: ICD-10-CM

## 2023-01-25 DIAGNOSIS — Z87.891 PERSONAL HISTORY OF NICOTINE DEPENDENCE: ICD-10-CM

## 2023-01-25 PROCEDURE — 94010 BREATHING CAPACITY TEST: CPT

## 2023-01-25 PROCEDURE — 94727 GAS DIL/WSHOT DETER LNG VOL: CPT

## 2023-01-25 PROCEDURE — 94729 DIFFUSING CAPACITY: CPT

## 2023-01-25 PROCEDURE — 99205 OFFICE O/P NEW HI 60 MIN: CPT | Mod: 25

## 2023-01-25 NOTE — REASON FOR VISIT
[Initial] : an initial visit [Abnormal CXR/ Chest CT] : an abnormal CXR/ chest CT [Asthma] : asthma [COPD] : COPD [Emphysema] : emphysema [Cough] : cough [Shortness of Breath] : shortness of breath [Spouse] : spouse

## 2023-01-25 NOTE — PROCEDURE
[FreeTextEntry1] : PFT's performed in office show severe obstructive lung defect.  Lung volumes are within normal limits.  There is a moderate decrease in diffusing capacity.\par FEV1: 63% \par FEV1/FVC Ratio: 50% \par QNR35-65%: 19% \par DLCO: 59% \par \par Discussed and compared to PFT on file from 5/2019:\par FEV1: 56% \par FEV1/FVC Ratio: 86%\par DYH99-77%: 24%\par DLCO: 69%\par \par \par

## 2023-01-25 NOTE — PHYSICAL EXAM
[No Acute Distress] : no acute distress [Well Nourished] : well nourished [No Deformities] : no deformities [Normal Appearance] : normal appearance [No Neck Mass] : no neck mass [Normal Rate/Rhythm] : normal rate/rhythm [Normal S1, S2] : normal s1, s2 [No Murmurs] : no murmurs [No Resp Distress] : no resp distress [Wheeze] : wheeze [No Abnormalities] : no abnormalities [Normal Gait] : normal gait [No Clubbing] : no clubbing [No Cyanosis] : no cyanosis [No Edema] : no edema [FROM] : FROM [Normal Color/ Pigmentation] : normal color/ pigmentation [No Focal Deficits] : no focal deficits [Oriented x3] : oriented x3 [Normal Affect] : normal affect [TextBox_68] : Bilateral diffuse expiratory wheezing auscultated. Cough upon deep inhalation.

## 2023-01-25 NOTE — HISTORY OF PRESENT ILLNESS
[Former] : former [Never] : never [< 20 pack-years] : < 20 pack-years [TextBox_4] : Ms. GIBBS  is a 77 year old, former smoking (quit 2016), female. She has past medical history of non-Hodgkin's lymphoma (on Rituxin since April 2022 managed by Dr. Juan Payan MD of [facility]), moderate COPD, abnormal Chest CT w/ pulmonary nodules, TIA (40 years ago) on Plavix, & Covid-19 infection (10/2022 s/p MAB @ [facility]). She presents for an initial pulmonary evaluation. She has been followed for many years by Dr. Gomes. She presents with her , Bhargav. \par \par Her chief concern is recent abnormal Chest CT and cough x 3 months (since Covid infection 10/2022).\par \par She states cough has slightly improved since Covid-19 infection, but persists. \par Patient states she has noticed cough triggered talking, walking, and being outside in the cold air. \par She notes she wears a mask outside, which helps with the cold weather. \par She states cough does not keep her awake at night. \par \par She admits to SOB with coughing fit and exertion. \par She admits to ongoing fatigue s/p Covid infection in October, but notes she is unsure if fatigue is related to post-covid infection or Chemo. \par \par She states she has received Rituxin every other month since April 2022 managed by Dr. Martinez (with the exception of when her blood levels have been off and she was not eligible to receive the infusion as scheduled). \par \par She states she has tried Trelegy inhaler, but notes she coughed after inhalation and felt she was unable to do it.\par She has been on Spiriva HH for 2-3 years, on and off as she has difficulty tolerating medication and also because she has not felt the need to be on it as her cough stopped when she had quit smoking in 2016. \par She states she has not used Albuterol inhaler as she has not felt the need. \par She takes daily OTC Anti-histamine. She notes long standing post nasal drip. \par \par PCP: Dr. Alston \par Neuro: Dr. Bell\par Onc MD: Dr. Martinez ([facility]) \par \par She denies fever/chills, night sweats, decreased appetite, unintentional weight loss, SOB @ rest, chest tightness or wheezing. \par  [TextBox_11] : .25 [TextBox_13] : 55 [YearQuit] : 2016

## 2023-01-25 NOTE — DISCUSSION/SUMMARY
[FreeTextEntry1] : Patient has had multiple chest CTs dating back to 2012:\par - Chest CT from 4/23/18 showed tree in bud nodules with a ground glass nodule in the RUL. Atelectasis in lingula and RML present. \par - CT Chest 8/5/19 no suspicious pulmonary nodules were noted. Emphysema was present. Evidence of airways inflammation was also noted. Heart size was normal. \par - CT Chest 6/10/21 branching tubular opacity in the right upper lobe consistent with mucoid impaction. - - PET/CT from May 2022 did not report any "significant" lung nodules.\par \par 1/10/2023 CHEST CT compared with 6/10/2021 CT: \par IMPRESSION:\par 1.  New multifocal solid and part solid nodular densities in both lungs favored to be infectious. A repeat chest CT scan is recommended in 6-8 weeks following treatment to assess for resolution.\par 2.  Emphysema\par \par 1/14/2023 PET CT IMPRESSION:\par Numerous FDG avid mixed solid and groundglass nodular densities bilaterally are grossly unchanged as compared to CT dated 1/10/2023, however, comparison is limited due to differences in CT technique. Findings are favored to reflect multifocal pneumonia. Neoplasm is less likely. Clinical correlation and follow-up with dedicated CT of chest in 6-8 weeks following treatment are recommended to assess for resolution.\par

## 2023-01-25 NOTE — REVIEW OF SYSTEMS
[Fatigue] : fatigue [Postnasal Drip] : postnasal drip [Cough] : cough [SOB on Exertion] : sob on exertion [Negative] : Endocrine [Fever] : no fever [Chills] : no chills [Poor Appetite] : no poor appetite [Nasal Congestion] : no nasal congestion [Sinus Problems] : no sinus problems [Chest Tightness] : no chest tightness [Sputum] : no sputum [Dyspnea] : no dyspnea [Wheezing] : no wheezing

## 2023-01-26 ENCOUNTER — NON-APPOINTMENT (OUTPATIENT)
Age: 78
End: 2023-01-26

## 2023-01-26 ENCOUNTER — APPOINTMENT (OUTPATIENT)
Dept: PULMONOLOGY | Facility: CLINIC | Age: 78
End: 2023-01-26

## 2023-02-21 ENCOUNTER — TRANSCRIPTION ENCOUNTER (OUTPATIENT)
Age: 78
End: 2023-02-21

## 2023-03-08 ENCOUNTER — APPOINTMENT (OUTPATIENT)
Dept: CT IMAGING | Facility: CLINIC | Age: 78
End: 2023-03-08

## 2023-03-08 ENCOUNTER — OUTPATIENT (OUTPATIENT)
Dept: OUTPATIENT SERVICES | Facility: HOSPITAL | Age: 78
LOS: 1 days | End: 2023-03-08
Payer: MEDICARE

## 2023-03-08 DIAGNOSIS — J18.9 PNEUMONIA, UNSPECIFIED ORGANISM: ICD-10-CM

## 2023-03-08 DIAGNOSIS — Z98.891 HISTORY OF UTERINE SCAR FROM PREVIOUS SURGERY: Chronic | ICD-10-CM

## 2023-03-08 PROCEDURE — 71250 CT THORAX DX C-: CPT | Mod: 26,MH

## 2023-03-08 PROCEDURE — 71250 CT THORAX DX C-: CPT

## 2023-03-13 ENCOUNTER — NON-APPOINTMENT (OUTPATIENT)
Age: 78
End: 2023-03-13

## 2023-03-14 ENCOUNTER — NON-APPOINTMENT (OUTPATIENT)
Age: 78
End: 2023-03-14

## 2023-03-14 ENCOUNTER — APPOINTMENT (OUTPATIENT)
Dept: PULMONOLOGY | Facility: CLINIC | Age: 78
End: 2023-03-14
Payer: MEDICARE

## 2023-03-14 VITALS
SYSTOLIC BLOOD PRESSURE: 110 MMHG | OXYGEN SATURATION: 97 % | HEIGHT: 64 IN | BODY MASS INDEX: 25.27 KG/M2 | DIASTOLIC BLOOD PRESSURE: 70 MMHG | WEIGHT: 148 LBS | HEART RATE: 97 BPM | RESPIRATION RATE: 17 BRPM | TEMPERATURE: 97.6 F

## 2023-03-14 DIAGNOSIS — M54.50 LOW BACK PAIN, UNSPECIFIED: ICD-10-CM

## 2023-03-14 DIAGNOSIS — G89.29 LOW BACK PAIN, UNSPECIFIED: ICD-10-CM

## 2023-03-14 DIAGNOSIS — G93.40 ENCEPHALOPATHY, UNSPECIFIED: ICD-10-CM

## 2023-03-14 PROCEDURE — 94010 BREATHING CAPACITY TEST: CPT

## 2023-03-14 PROCEDURE — 94618 PULMONARY STRESS TESTING: CPT

## 2023-03-14 PROCEDURE — 95012 NITRIC OXIDE EXP GAS DETER: CPT

## 2023-03-14 PROCEDURE — 99214 OFFICE O/P EST MOD 30 MIN: CPT | Mod: 25

## 2023-03-14 RX ORDER — CLARITHROMYCIN 500 MG/1
500 TABLET, FILM COATED ORAL
Qty: 20 | Refills: 0 | Status: DISCONTINUED | COMMUNITY
Start: 2023-01-25 | End: 2023-03-14

## 2023-03-14 RX ORDER — FAMOTIDINE 40 MG/1
40 TABLET, FILM COATED ORAL
Qty: 90 | Refills: 1 | Status: ACTIVE | COMMUNITY
Start: 2023-03-14 | End: 1900-01-01

## 2023-03-14 RX ORDER — PREDNISONE 10 MG/1
10 TABLET ORAL
Qty: 21 | Refills: 0 | Status: DISCONTINUED | COMMUNITY
Start: 2023-01-25 | End: 2023-03-14

## 2023-03-24 ENCOUNTER — TRANSCRIPTION ENCOUNTER (OUTPATIENT)
Age: 78
End: 2023-03-24

## 2023-03-29 ENCOUNTER — APPOINTMENT (OUTPATIENT)
Dept: OTOLARYNGOLOGY | Facility: CLINIC | Age: 78
End: 2023-03-29
Payer: MEDICARE

## 2023-03-29 VITALS
BODY MASS INDEX: 25.27 KG/M2 | SYSTOLIC BLOOD PRESSURE: 136 MMHG | WEIGHT: 148 LBS | HEIGHT: 64 IN | HEART RATE: 77 BPM | TEMPERATURE: 97.6 F | DIASTOLIC BLOOD PRESSURE: 69 MMHG

## 2023-03-29 DIAGNOSIS — R13.14 DYSPHAGIA, PHARYNGOESOPHAGEAL PHASE: ICD-10-CM

## 2023-03-29 PROCEDURE — 99204 OFFICE O/P NEW MOD 45 MIN: CPT | Mod: 25

## 2023-03-29 PROCEDURE — 31575 DIAGNOSTIC LARYNGOSCOPY: CPT

## 2023-03-29 NOTE — ASSESSMENT
[FreeTextEntry1] : dysphagia:\par - laryngoscopy is WNL\par - agree with MBS and f/u with GI for upper endoscopy; will reach out to speech dept to see if they can move up her MBS which is currently scheduled for 4/25\par - f/u with Dr. Go as scheduled\par - f/u with pulm\par \par left cervical LAD:\par - she notes her oncologist is aware and following\par - known hx NHL

## 2023-03-29 NOTE — PHYSICAL EXAM
[Midline] : trachea located in midline position [Normal] : no rashes [de-identified] : left level II palpable LN

## 2023-03-29 NOTE — HISTORY OF PRESENT ILLNESS
[de-identified] : 79yo female with coughing/choking with food and liquids. She notes she has had to stop eating solids because she notes it gets stuck in her throat. She clarifies that she does eat solids but she has to cut up very small. She notes she has an appt with Dr. Go to check some testing including MBS she has at the end of the month. She has reflux and hernia and f/u with him for this. No weight loss. No fever/chills. Gets occasional night sweats. She has non-Hodgkins lymphoma and is being treated with Rituxan currently. No hemoptysis. Has ? signs of aspiration noted on CT. Hx smoker.

## 2023-03-29 NOTE — CONSULT LETTER
[Dear  ___] : Dear  [unfilled], [Consult Letter:] : I had the pleasure of evaluating your patient, [unfilled]. [Please see my note below.] : Please see my note below. [Consult Closing:] : Thank you very much for allowing me to participate in the care of this patient.  If you have any questions, please do not hesitate to contact me. [Sincerely,] : Sincerely, [DrMakenna  ___] : Dr. PRUITT [FreeTextEntry3] : Marguerite Perez MD\par Otolaryngology and Cranial Base Surgery\par Attending Physician - Department of Otolaryngology and Head & Neck Surgery\par [facility]\par \par [employer] [facility]

## 2023-03-29 NOTE — PROCEDURE
[de-identified] : Reason for laryngoscopy: Oral exam unable to account for symptoms.\par \par Flexible scope #2 used. Passed through nasal passage and nasopharynx/oropharynx/hypopharynx clear. Supraglottis normal. Glottis with fully mobile vocal cords without lesions or masses. Visualized subglottis clear. Postcricoid area without erythema or edema. No pooling of secretions.

## 2023-03-31 NOTE — REASON FOR VISIT
[Initial] : an initial visit [Cough] : cough [Shortness of Breath] : shortness of breath [Spouse] : spouse [TextBox_44] : SOB, GERD, allergies/PND, ?BRANDAN, and abnormal CT

## 2023-03-31 NOTE — ADDENDUM
[FreeTextEntry1] : ***AMENDED BY GEOFF MTZ 03/31/2023 ****\par \par Documented by Cameron Manzano acting as a scribe for Dr. Pepe Frausto on 03/14/2023.\par \par All medical record entries made by the Scribe were at my, Dr. Pepe Frausto's, direction and personally dictated by me on 03/14/2023. I have reviewed the chart and agree that the record accurately reflects my personal performance of the history, physical exam, assessment and plan. I have also personally directed, reviewed, and agree with the discharge instructions.

## 2023-03-31 NOTE — PROCEDURE
[FreeTextEntry1] : CT Chest (3/8/2023) revealed overall decreased patchy opacities in the left upper and left lower lobes with new patchy opacity in the anterior segment of the left upper lobe. Recommend continued surveillance with repeat CT scan in 6-8 weeks.\par \par PFT revealed moderate obstructive dysfunction, with a FEV1 of 1.32L, which is 66% of predicted, with a normal flow volume loop \par \par Feno was 18; a normal value being less than 25. Fractional exhaled nitric oxide (FENO) is regarded as a simple, noninvasive method for assessing eosinophilic airway inflammation. Produced by a variety of cells within the lung, nitric oxide (NO) concentrations are generally low in healthy individuals. However, high concentrations of NO appear to be involved in nonspecific host defense mechanisms and chronic inflammatory  diseases such as asthma. The American Thoracic Society (ATS) therefore recommended using FENO to aid in the diagnosis and monitoring of eosinophilic airway inflammation and asthma, and for identifying steroid responsive individuals whose chronic respiratory symptoms may be caused by airway inflammation \par \par 6 minute walk test reveals a low saturation of 97% with evidence of moderate dyspnea or fatigue; walked 163 meters. Pt stopped at 3 minutes due to fatigue and slight dizziness

## 2023-03-31 NOTE — ASSESSMENT
[FreeTextEntry1] : Ms. GIBBS  is a 77 year old, former smoker (quit 2016), female. She has past medical history of non-Hodgkin's lymphoma (on Rituxin since April 2022 managed by Dr. Juan Payan MD of [facility]), moderate COPD, abnormal Chest CT w/ pulmonary nodules, TIA (40 years ago) on Plavix, & Covid-19 infection (10/2022 s/p MAB @ [facility]),s/p COVID-19 10/2022 complicated by abnormal CT, COPD/PNA. She presents for a f/u pulmonary evaluation for SOB, GERD, allergies/PND, ?BRANDAN, and abnormal CT c/w inflammatory dz at present\par \par ***************************************PRE-OP CLEARANCE for endoscopy***************************************\par -at this point in time there are no absolute pulmonary contraindications to go forward with the planned procedure\par -at the time of surgery she should have optimal pain control, incentive spirometry, early ambulation, \par \par The patient's SOB is felt to be multifactorial:\par -poor mechanics of breathing\par -out of shape/overweight\par -Pulmonary\par   -COPD\par   -Emphysema\par -Cardiac (Gamaliel)\par \par Problem 1: COPD/Emphysema\par -continue Breztri 2 puffs BID \par -add DuoNeb via nebulizer, Q6H (before Breztri)\par -COPD is a progressive disease and although it cant be cured, appropriate management can slow its progression, reduce frequency and severity of exacerbations, and improve symptoms and the patient quality of life. Hospitalizations are the greatest contributor to the total COPD costs and account for up to 87% of total COPD related costs. Exacerbations are the main cause of admissions and subsequently account for the 40%-75% of COPD costs. Inhaled maintenance therapy reduces the incidence of exacerbations in patients with stable CPPD. Incorrect inhaler use and nonadherence are major obstacles to achieving COPD treatments goals. Many COPD patients have challenges (impaired inhalation, limited dexterity, reduced cognition: that limit their ability to correctly use their COPD treatment devices resulting in reduced symptom control. Of most importance is smoking cessation and early intervention with respiratory illnesses and contemplation for pulmonary rehab to enhance quality of life. \par \par \par Problem 2: Allergies/PND\par -add Dymista 1 sniff BID \par -Environmental measures for allergies were encouraged including mattress and pillow cover, air purifier, and environmental controls. \par \par Problem 3: GERD (Saturnino Go) \par -continue Prevacid 30 mg QAM before breakfast\par -add Pepcid 40 mg QHS \par -Rule of 2s: avoid eating too much, eating too late, eating too spicy, eating two hours before bed.\par - Things to avoid including overeating, spicy foods, tight clothing, eating within two hours of bed, this list is not all inclusive.\par - For treatments of reflux, possible options discussed including diet control, H2 blockers, PPIs, as well as coating motility agents discussed as treatment options. Timing of meals and proximity of last meal to sleep were discussed. If symptoms persist, a formal gastrointestinal evaluation is needed. \par \par Problem 4: ?BRANDAN (risk factors: poor sleep, elevated Mallampati class)\par -complete home sleep study - currently not interested \par -Sleep apnea is associated with adverse clinical consequences which can affect most organ systems. Cardiovascular disease risk includes arrhythmias, atrial fibrillation, hypertension, coronary artery disease, and stroke. Metabolic disorders include diabetes type 2, non-alcoholic fatty liver disease. Mood disorder especially depression; and cognitive decline especially in the elderly. Associations with chronic reflux/Aguilar’s esophagus some but not all inclusive. \par -Reasons include arousal consistent with hypopnea; respiratory events most prominent in REM sleep or supine position; therefore sleep staging and body position are important for accurate diagnosis and estimation of AHI. \par \par Problem 5: Abnormal CT c/w inflammatory disease - improved, overall decreased densities in TERA and left side, new (?aspiration)-though pt at risk for lung CA based on prior smoking Hx\par -complete GI evaluation (Dr. Saturnino Go)\par -complete FEESST study\par -complete fluoroscopy of the diaphragm\par -complete f/u CT 4-6 months\par -CAT scans are the only radiological modality to identify abnormalities w/in the lings with regards to nodules/masses/lymph nodes. Risks, benefits were reviewed in detail. The guidelines for abnormalities include follow up CT scans at various intervals which could range from 6 weeks to 1 year intervals. If there is a change for the worse then considerations for a biopsy will be considered if you are a candidate. Second opinion evaluation with thoracic surgeon or an interventional radiologist could be offered,			 \par \par Problem 6: Cardiac (Gamaliel)\par -Recommend cardiac follow up evaluation with cardiologist if needed \par \par Problem 7: overweight/out of shape\par -Recommended Jimmy Jaylen's 10-day detox diet and book.\par -Recommend "Muniq" OTC for weight loss, energy, and blood sugar\par - Weight loss, exercise and diet control were discussed and are highly encouraged. Treatment options were given such as aqua therapy, and contacting a nutritionist. Recommended to use the elliptical, stationary bike, less use of treadmill. Mindful eating was explained to the patient. Obesity is associated with worsening asthma, SOB, and potential for cardiac disease, diabetes, and other underlying medical conditions.\par \par Problem 8: Poor mechanics of breathing\par -Recommended Wim Hof and Buteyko breathing techniques\par -Recommended [email or web address].org and to YouTube "aerobic exercises for seniors"\par -Proper breathing techniques were reviewed with an emphasis on exhalation. Patient instructed to breath in for 1 second and out for four seconds. Patient was encouraged not to talk while walking.\par \par Problem 9: Health Maintenance\par -recommended Sanotize anti viral nasal spray in case of viral infection\par -s/p flu shot\par -recommended strep pneumonia vaccines: Prevnar-20 vaccine, follow by Pneumo vaccine 23 one year following\par -recommended early intervention for URIs\par -recommended regular osteoporosis evaluations\par -recommended early dermatological evaluations\par -recommended after the age of 50 to the age of 70, colonoscopy every 5 years\par \par f/u in 6-8 weeks\par pt is encouraged to call or fax the office with any questions or concerns. \par

## 2023-03-31 NOTE — HISTORY OF PRESENT ILLNESS
[Former] : former [< 20 pack-years] : < 20 pack-years [Never] : never [TextBox_4] : Ms. GIBBS  is a 77 year old, former smoking (quit 2016), female. She has past medical history of non-Hodgkin's lymphoma (on Rituxin since April 2022 managed by Dr. Juan Payan MD of [facility]), moderate COPD, abnormal Chest CT w/ pulmonary nodules, TIA (40 years ago) on Plavix, & Covid-19 infection (10/2022 s/p MAB @ [facility]). She presents for an initial pulmonary evaluation. She has been followed for many years by Dr. Gomes. She presents with her , Bhargav. \par \par -she notes she cannot sleep on her left side (pain) which she thinks is due to her lung\par -she notes SOB when she is on her left side\par -s/p COVID-19 infection  (10/2022) which prompted a cough for several weeks\par -she notes that she is no longer coughing\par -she notes taking a nebulizer and inhaler daily\par -she notes palpitations and tachycardia\par -she notes trouble swallowing when eating pasta\par -she notes feeling a lump in her stomach after eating (past few months)\par -she notes heartburn and reflux often\par -she notes that she has trouble falling asleep at times but her sleep is generally fine\par -she notes SOB when she goes up the stairs and when she walks more than 3 blocks\par -she denies snoring \par -she notes that she does not feel rested when she wakes up\par -she notes PND \par \par -patient denies any headaches, nausea, vomiting, fever, chills, sweats, chest pain, chest pressure, diarrhea, constipation, myalgias, dizziness, leg swelling, leg pain, itchy eyes, itchy ears\par  [TextBox_11] : .25 [TextBox_13] : 55 [YearQuit] : 2016

## 2023-03-31 NOTE — PHYSICAL EXAM
[No Acute Distress] : no acute distress [Well Nourished] : well nourished [No Deformities] : no deformities [Normal Oropharynx] : normal oropharynx [III] : Mallampati Class: III [Normal Appearance] : normal appearance [No Neck Mass] : no neck mass [Normal Rate/Rhythm] : normal rate/rhythm [Normal S1, S2] : normal s1, s2 [No Murmurs] : no murmurs [No Resp Distress] : no resp distress [Clear to Auscultation Bilaterally] : clear to auscultation bilaterally [Wheeze] : wheeze [No Abnormalities] : no abnormalities [Benign] : benign [Normal Gait] : normal gait [No Clubbing] : no clubbing [No Cyanosis] : no cyanosis [No Edema] : no edema [FROM] : FROM [Normal Color/ Pigmentation] : normal color/ pigmentation [No Focal Deficits] : no focal deficits [Oriented x3] : oriented x3 [Normal Affect] : normal affect [TextBox_68] : I:E 1:3; Clear

## 2023-04-04 ENCOUNTER — RX RENEWAL (OUTPATIENT)
Age: 78
End: 2023-04-04

## 2023-04-25 ENCOUNTER — APPOINTMENT (OUTPATIENT)
Dept: SPEECH THERAPY | Facility: HOSPITAL | Age: 78
End: 2023-04-25

## 2023-04-25 ENCOUNTER — OUTPATIENT (OUTPATIENT)
Dept: OUTPATIENT SERVICES | Facility: HOSPITAL | Age: 78
LOS: 1 days | Discharge: HOME | End: 2023-04-25

## 2023-04-25 ENCOUNTER — OUTPATIENT (OUTPATIENT)
Dept: OUTPATIENT SERVICES | Facility: HOSPITAL | Age: 78
LOS: 1 days | End: 2023-04-25

## 2023-04-25 ENCOUNTER — APPOINTMENT (OUTPATIENT)
Dept: RADIOLOGY | Facility: HOSPITAL | Age: 78
End: 2023-04-25
Payer: MEDICARE

## 2023-04-25 ENCOUNTER — APPOINTMENT (OUTPATIENT)
Dept: SPEECH THERAPY | Facility: HOSPITAL | Age: 78
End: 2023-04-25
Payer: MEDICARE

## 2023-04-25 DIAGNOSIS — K21.9 GASTRO-ESOPHAGEAL REFLUX DISEASE WITHOUT ESOPHAGITIS: ICD-10-CM

## 2023-04-25 DIAGNOSIS — R06.02 SHORTNESS OF BREATH: ICD-10-CM

## 2023-04-25 DIAGNOSIS — Z98.891 HISTORY OF UTERINE SCAR FROM PREVIOUS SURGERY: Chronic | ICD-10-CM

## 2023-04-25 PROCEDURE — 76000 FLUOROSCOPY <1 HR PHYS/QHP: CPT | Mod: 26,59

## 2023-04-25 PROCEDURE — 74230 X-RAY XM SWLNG FUNCJ C+: CPT | Mod: 26

## 2023-04-25 NOTE — HISTORY OF PRESENT ILLNESS
[FreeTextEntry1] : Patient arrived to Radiology for an OutPatient Modified Barium Swallow Study. 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.9)\par Bilateral lumbar radiculopathy (724.4) (M54.16)\par Chronic low back pain (724.2,338.29) (M54.50,G89.29)\par Closed fracture of distal end of left radius with routine healing, subsequent encounter\par (V54.12) (S52.502D)\par Cough (786.2) (R05.9)\par COVID-19 virus infection (079.89) (U07.1)\par Distal radius fracture, left (813.42) (S52.502A)\par Dry skin (701.1) (L85.3)\par Encephalopathy (348.30) (G93.40)\par Encounter for immunization (V03.89) (Z23)\par GERD (gastroesophageal reflux disease) (530.81) (K21.9)\par History of colon polyps (V12.72) (Z86.010)\par Multifocal pneumonia (486) (J18.9)\par Muscle cramps (729.82) (R25.2)\par 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 GIBBS is a 77 year old, former smoker (quit 2016), female. She has past medical history of non-Hodgkin's lymphoma (on Rituxin since April 2022 managed by Dr. Juan Payan MD of [facility]), moderate COPD, abnormal Chest CT w/ pulmonary nodules, TIA (40 years ago) on Plavix, & Covid-19 infection (10/2022 s/p MAB @ [facility]),s/p COVID-19 10/2022 complicated by abnormal CT, COPD/PNA. She presents for a f/u pulmonary evaluation for SOB, GERD, allergies/PND, ?BRANDAN, and abnormal CT c/w inflammatory dz at present.\par \par \par ENT Note 3/29/2023 - 79yo female with coughing/choking with food and liquids. She notes she has had to stop eating solids because she notes it gets stuck in her throat. She clarifies that she does eat solids but she has to cut up very small. She notes she has an appt with Dr. Go to check some testing including MBS she has at the end of the month. She has reflux and hernia and f/u with him for this. No weight loss. No fever/chills. Gets occasional night sweats. She has non-Hodgkins lymphoma and is being treated with Rituxan currently. No hemoptysis. Has ? signs of aspiration noted on CT. Hx smoker. \par dysphagia:\par - laryngoscopy is WNL\par - agree with MBS and f/u with GI for upper endoscopy; will reach out to speech dept to see if they can move up her MBS which is currently scheduled for 4/25\par - f/u with Dr. Go as scheduled\par - f/u with pulm\par \par \par Patient offers complaint of "stomach pain" "I had an EGD with a polyp found in my stomach, but it is okay" "I have reflux" "when I eat pasta I feel a lump in my mid chest, water, grapes come back up and out of my mouth" "I feel its gotten worse with my stomach"  Patient reports no current/repeated pneumonia.  Patient eats Regular with Thin Liquids.   This Modified Barium Swallow Study is to objectively assess the Physiology of the Oral and Pharyngeal Stage swallowing mechanism for treatment plan for least restrictive diet. \par \par Referring MD: Dr. Pepe Frausto\par Fax: [phone]\par \par \par

## 2023-04-25 NOTE — PLAN
[FreeTextEntry2] : \par 1.) Continue with current diet of regimen of Regular and Thin Liquids\par 2.) Aspiration Precautions\par 3.) Reflux Precautions\par 4.) Maintain Good Oral Hygiene Care\par 5.) Follow up with Physician\par \par SLP provided Patient education regarding preliminary results. Patient verbalized understanding and will follow up with Physician.\par

## 2023-04-25 NOTE — ASSESSMENT
[FreeTextEntry1] : Patient presents with Functional Oral and Pharyngeal Stage swallowing mechanism. The Oral Stage is characterized by adequate oral containment, adequate chewing for solid, adequate bolus manipulation, adequate tongue motion with adequate anterior to posterior transfer of the bolus with adequate oral clearance.  The Pharyngeal stage is characterized by adequate initiation of the pharyngeal swallow, adequate laryngeal elevation, adequate tongue base retraction and adequate pharyngeal constriction. There is adequate pharyngeal clearance post swallow for puree/solids.  There was No Aspiration observed before, during or after the swallow for puree, solids, thin liquids. \par \par RESULTS: \par No Aspiration observed before, during or after the swallow for puree, solids, thin liquids. \par \par Of Note: An Esophageal Screen was performed. Patient was given a Barium Tablet with a cup of water. The Tablet was observed to course through the pharynx/esophagus without hold up. This cannot be considered as a full complete evaluation of the esophagus.

## 2023-04-26 ENCOUNTER — NON-APPOINTMENT (OUTPATIENT)
Age: 78
End: 2023-04-26

## 2023-04-26 DIAGNOSIS — R13.10 DYSPHAGIA, UNSPECIFIED: ICD-10-CM

## 2023-05-09 ENCOUNTER — NON-APPOINTMENT (OUTPATIENT)
Age: 78
End: 2023-05-09

## 2023-05-12 ENCOUNTER — APPOINTMENT (OUTPATIENT)
Dept: CT IMAGING | Facility: CLINIC | Age: 78
End: 2023-05-12
Payer: MEDICARE

## 2023-05-12 ENCOUNTER — OUTPATIENT (OUTPATIENT)
Dept: OUTPATIENT SERVICES | Facility: HOSPITAL | Age: 78
LOS: 1 days | End: 2023-05-12
Payer: MEDICARE

## 2023-05-12 ENCOUNTER — NON-APPOINTMENT (OUTPATIENT)
Age: 78
End: 2023-05-12

## 2023-05-12 DIAGNOSIS — Z98.891 HISTORY OF UTERINE SCAR FROM PREVIOUS SURGERY: Chronic | ICD-10-CM

## 2023-05-12 DIAGNOSIS — R93.89 ABNORMAL FINDINGS ON DIAGNOSTIC IMAGING OF OTHER SPECIFIED BODY STRUCTURES: ICD-10-CM

## 2023-05-12 PROCEDURE — 71250 CT THORAX DX C-: CPT | Mod: 26,MH

## 2023-05-12 PROCEDURE — 71250 CT THORAX DX C-: CPT

## 2023-05-16 ENCOUNTER — TRANSCRIPTION ENCOUNTER (OUTPATIENT)
Age: 78
End: 2023-05-16

## 2023-05-16 LAB — LEGIONELLA AG UR QL: NEGATIVE

## 2023-05-17 ENCOUNTER — APPOINTMENT (OUTPATIENT)
Dept: PULMONOLOGY | Facility: CLINIC | Age: 78
End: 2023-05-17
Payer: MEDICARE

## 2023-05-17 ENCOUNTER — TRANSCRIPTION ENCOUNTER (OUTPATIENT)
Age: 78
End: 2023-05-17

## 2023-05-17 VITALS
HEART RATE: 88 BPM | HEIGHT: 64 IN | SYSTOLIC BLOOD PRESSURE: 126 MMHG | WEIGHT: 148 LBS | BODY MASS INDEX: 25.27 KG/M2 | OXYGEN SATURATION: 97 % | DIASTOLIC BLOOD PRESSURE: 70 MMHG | RESPIRATION RATE: 17 BRPM | TEMPERATURE: 97.6 F

## 2023-05-17 LAB
M PNEUMO IGG SER IA-ACNC: POSITIVE
M PNEUMO IGG SER QL IA: 3.31 INDEX
M PNEUMO IGM SER QL IA: 0.47 INDEX
MYCOPLASMA AG SPEC QL: NEGATIVE

## 2023-05-17 PROCEDURE — 99214 OFFICE O/P EST MOD 30 MIN: CPT

## 2023-05-17 RX ORDER — PROMETHAZINE HYDROCHLORIDE AND DEXTROMETHORPHAN HYDROBROMIDE ORAL SOLUTION 15; 6.25 MG/5ML; MG/5ML
6.25-15 SOLUTION ORAL
Qty: 1 | Refills: 1 | Status: ACTIVE | COMMUNITY
Start: 2023-05-17 | End: 1900-01-01

## 2023-05-18 ENCOUNTER — TRANSCRIPTION ENCOUNTER (OUTPATIENT)
Age: 78
End: 2023-05-18

## 2023-05-18 LAB
B PERT IGG SER-ACNC: 2.55 INDEX
B PERT IGM SER-ACNC: <1 INDEX

## 2023-05-19 ENCOUNTER — TRANSCRIPTION ENCOUNTER (OUTPATIENT)
Age: 78
End: 2023-05-19

## 2023-05-21 LAB
CHLAMYDIA AB SER-ACNC: NORMAL
CHLAMYDIA PNEUMONIAE AB IGA: NORMAL
CHLAMYDIA PNEUMONIAE AB IGG: ABNORMAL
CHLAMYDIA PNEUMONIAE AB IGM: NORMAL

## 2023-05-21 NOTE — ASSESSMENT
[FreeTextEntry1] : Ms. GIBBS is a 78 year old, former smoking (quit 2016), female. She has past medical history of non-Hodgkin's lymphoma (on Rituxin since April 2022 managed by Dr. Martinez Onc MD of [facility]), moderate COPD, abnormal Chest CT w/ pulmonary nodules, TIA (40 years ago) on Plavix, & Covid-19 infection (10/2022 s/p MAB @ [facility]). She presents for acute visit. \par Her chief concern is cough.\par \par 1. Abnormal chest CT/PNA\par -complete Zithromax\par -results of Chest CT not available, will call patient when results are available.\par \par 2.Cough\par -likely related PNA\par -add prednisone 20 mg X 7 days, then 10 mg X 7 days. \par -continue Benzonatate 200 mg TID PRN\par \par 3. COPD/Emphysema\par -continue DuoNeb via nebulizer, Q6H (before Breztri)\par -continue Breztri 2 puffs BID \par \par 4. Allergies/PND\par -continue Dymista 1 sniff BID \par \par 5. GERD (Lev Donavan) \par -continue Prevacid 30 mg QAM before breakfast\par -continue Pepcid 40 mg QHS \par \par Patient to follow up with Dr. Frausto as scheduled.\par Patient to call with further questions and concerns.\par Patient verbalizes understanding of care plan and is agreeable.\par \par

## 2023-05-21 NOTE — REVIEW OF SYSTEMS
[Cough] : cough [SOB on Exertion] : sob on exertion [Negative] : Psychiatric [Hemoptysis] : no hemoptysis [Chest Tightness] : no chest tightness [Frequent URIs] : no frequent URIs [Sputum] : no sputum [Dyspnea] : no dyspnea [Pleuritic Pain] : no pleuritic pain [Wheezing] : no wheezing [A.M. Dry Mouth] : no a.m. dry mouth

## 2023-05-21 NOTE — PHYSICAL EXAM
[Normal Oropharynx] : normal oropharynx [No Acute Distress] : no acute distress [Normal Appearance] : normal appearance [Normal Rate/Rhythm] : normal rate/rhythm [Normal S1, S2] : normal s1, s2 [No Resp Distress] : no resp distress [Clear to Auscultation Bilaterally] : clear to auscultation bilaterally [No Abnormalities] : no abnormalities [Benign] : benign [Normal Color/ Pigmentation] : normal color/ pigmentation [Oriented x3] : oriented x3 [No Focal Deficits] : no focal deficits [Normal Affect] : normal affect

## 2023-05-21 NOTE — HISTORY OF PRESENT ILLNESS
[TextBox_4] : Ms. GIBBS is a 78 year old, former smoking (quit 2016), female. She has past medical history of non-Hodgkin's lymphoma (on Rituxin since April 2022 managed by Dr. Martinez Onc MD of [facility]), moderate COPD, abnormal Chest CT w/ pulmonary nodules, TIA (40 years ago) on Plavix, & Covid-19 infection (10/2022 s/p MAB @ [facility]). She presents for acute visit. \par \par Her chief concern is cough.\par \par Patient reports she is currently on Zithromax 500 mg day 5 of 7 for PNA on chest CT.  She reports she is compliant with the nebulizer 3 times a day and Breztri inhaler.  Patient states she still has a dry cough.  She states she has benzonatate tablets which she uses with some benefit.\par Patient reports she had a chest CT done last week as recommended by her oncologist. \par \par Patient denies fever, chills, SOB @ rest, wheezing, nasal congestion, runny nose, PND, or heartburn/reflux.\par

## 2023-05-22 ENCOUNTER — TRANSCRIPTION ENCOUNTER (OUTPATIENT)
Age: 78
End: 2023-05-22

## 2023-05-22 ENCOUNTER — NON-APPOINTMENT (OUTPATIENT)
Age: 78
End: 2023-05-22

## 2023-05-23 ENCOUNTER — TRANSCRIPTION ENCOUNTER (OUTPATIENT)
Age: 78
End: 2023-05-23

## 2023-05-24 ENCOUNTER — TRANSCRIPTION ENCOUNTER (OUTPATIENT)
Age: 78
End: 2023-05-24

## 2023-05-25 LAB
DEPRECATED KAPPA LC FREE/LAMBDA SER: 1.59 RATIO
IGA SER QL IEP: 339 MG/DL
IGG SER QL IEP: 995 MG/DL
IGM SER QL IEP: 83 MG/DL
KAPPA LC CSF-MCNC: 1.62 MG/DL
KAPPA LC SERPL-MCNC: 2.58 MG/DL

## 2023-05-26 ENCOUNTER — NON-APPOINTMENT (OUTPATIENT)
Age: 78
End: 2023-05-26

## 2023-05-28 LAB
IGG SUBSET TOTAL IGG: 1013 MG/DL
IGG1 SER-MCNC: 675 MG/DL
IGG2 SER-MCNC: 212 MG/DL
IGG3 SER-MCNC: 62 MG/DL
IGG4 SER-MCNC: 16 MG/DL

## 2023-05-31 ENCOUNTER — NON-APPOINTMENT (OUTPATIENT)
Age: 78
End: 2023-05-31

## 2023-05-31 ENCOUNTER — APPOINTMENT (OUTPATIENT)
Dept: THORACIC SURGERY | Facility: CLINIC | Age: 78
End: 2023-05-31
Payer: MEDICARE

## 2023-05-31 VITALS
HEIGHT: 64 IN | HEART RATE: 61 BPM | WEIGHT: 154 LBS | RESPIRATION RATE: 16 BRPM | SYSTOLIC BLOOD PRESSURE: 146 MMHG | BODY MASS INDEX: 26.29 KG/M2 | OXYGEN SATURATION: 98 % | DIASTOLIC BLOOD PRESSURE: 78 MMHG

## 2023-05-31 PROCEDURE — 99205 OFFICE O/P NEW HI 60 MIN: CPT

## 2023-06-01 ENCOUNTER — NON-APPOINTMENT (OUTPATIENT)
Age: 78
End: 2023-06-01

## 2023-06-01 RX ORDER — UBIDECARENONE 200 MG
CAPSULE ORAL
Refills: 0 | Status: ACTIVE | COMMUNITY

## 2023-06-01 RX ORDER — ALLOPURINOL 200 MG/1
TABLET ORAL
Refills: 0 | Status: ACTIVE | COMMUNITY

## 2023-06-01 RX ORDER — CHROMIUM 200 MCG
TABLET ORAL
Refills: 0 | Status: ACTIVE | COMMUNITY

## 2023-06-02 NOTE — HISTORY OF PRESENT ILLNESS
[FreeTextEntry1] : Ms. MARIBEL GIBBS, 78 year old female, former smoker (1/4 PPD x 55 years; Quit 2016), w/ hx of anxiety, PVD, non-Hodgkin's lymphoma (on Rituxin since April 2022 managed by Dr. Martinez Onc MD of [facility]), moderate COPD, abnormal Chest CT w/ pulmonary nodules, TIA (40 years ago) on Plavix, & Covid-19 infection (10/2022 s/p MAB @ [facility]). Established care with Dr. Frausto in March, 2023. Lung images consistent with inflammatory disease. s/p GI and ENT evaluation. laryngoscopy WNL; Evaluated by SLP: laryngoscopy is WNL. Referred for evaluation regarding biopsy for definitive diagnosis of /BOOP. 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\par \par PET/CT Dotatate on 5/5/23: \par - Bilateral pulmonary opacities with low level activity, for example: \par * RUL, 2.8 x 1.8 cm; SUV 1.6 (Image 44)\par *LLL, 0.9 x 0.7 cm; SUV 5.7 (Image 64)\par - Mildly Dotatate avid bilateral hilar foci:\par *Right, SUV 2 (Image 114)\par * Left, SV 2.1 (Image 120)\par - Left inginal node, 1.6 x 0.7; SUV 2, Image 251\par - Dotatate avid osseous cavarial lesions\par *Right Frontal; SUVmax 2.3 (Image 32)\par *Left Frontal; SUVmax 1.9 (Image 24)\par \par CT Chest on 5/12/23: \par - Resolved previously new left upper lobe opacity and new peripheral groundglass and consolidation in both lungs. The migratory pattern over multiple prior studies raises the possibility of organizing or eosinophilic pneumonia\par \par s/p Immunological blood work on 5/15/23 @ [facility]. \par - Chlamydia Pneumoniae 1:64 (H)\par - Bordetella pertussis igG Antibody: 2.55 (H)\par - Mycoplasma Pneumoniae \par *Mycoplasma IgG Ab EIA: 3.31 (H)\par *Mycoplasma IgG Interp: Positive \par \par \par Patient presents today or CTSx evaluation. Reports shortness of breath with exertion and chronic dry cough. Denies any fever, chills, or chest pain. She reports she completed prednisone taper last week.

## 2023-06-02 NOTE — DATA REVIEWED
[FreeTextEntry1] : Independently reviewed the following:\par - CT Chest on 3/8/23\par - PFTs on 3/14/23\par - Xray Fluoro of the Diaphragm on 4/25/23\par - Modified Barium on 4/25/23\par - CT Chest on 5/12/23

## 2023-06-02 NOTE — CONSULT LETTER
[FreeTextEntry2] : Dr. Frausto (Pulm/Ref)\par Dr. Martinez (HemeOnc/[facility])\par Dr. Alston (Card/[facility])\par Dr. Saturnino Go ()

## 2023-06-02 NOTE — ASSESSMENT
[FreeTextEntry1] : Ms. MARIBEL GIBBS, 78 year old female, former smoker (1/4 PPD x 55 years; Quit 2016), w/ hx of anxiety, PVD, non-Hodgkin's lymphoma (on Rituxin since April 2022 managed by Dr. Juan Payan MD of [facility]), moderate COPD, abnormal Chest CT w/ pulmonary nodules, TIA (40 years ago) on Plavix, & Covid-19 infection (10/2022 s/p MAB @ [facility]). Established care with Dr. Frausto in March, 2023. Lung images consistent with inflammatory disease. s/p GI and ENT evaluation. laryngoscopy WNL; Evaluated by SLP: laryngoscopy is WNL. Referred by Dr. Frausto for evaluation regarding biopsy for definitive diagnosis of /BOOP. \par \par I have independently reviewed the medical records and imaging at the time of this office consultation. PET/CT and CT Chest reviewed with patient. She has recently completed Prednisone last week. I would like her to repeat CT chest in 2-3 for re-evaluation since she just finished taper I am reluctant to do a biopsy at this time,  with the last scan as a guide. She is agreeable. Return to clinic after CT to discuss findings and next step.\par \par Recommendations reviewed with patient during this office visit, and all questions answered; Patient instructed on the importance of follow up and verbalizes understanding. \par \par \par I, STERLING Rao, personally performed the evaluation and management (E/M) services for this new patient.  That E/M includes conducting the initial examination, assessing all conditions, and establishing the plan of care.  Today, my ACP, Slim Garcia NP, was here to observe my evaluation and management services for this patient to be followed going forward.

## 2023-06-08 NOTE — REASON FOR VISIT
[Home] : at home, [unfilled] , at the time of the visit. [Medical Office: ([facility])___] : at the medical office located in  [Patient] : the patient [Follow-Up: _____] : a [unfilled] follow-up visit

## 2023-06-12 ENCOUNTER — OUTPATIENT (OUTPATIENT)
Dept: OUTPATIENT SERVICES | Facility: HOSPITAL | Age: 78
LOS: 1 days | End: 2023-06-12
Payer: MEDICARE

## 2023-06-12 ENCOUNTER — APPOINTMENT (OUTPATIENT)
Dept: CT IMAGING | Facility: CLINIC | Age: 78
End: 2023-06-12
Payer: MEDICARE

## 2023-06-12 DIAGNOSIS — Z98.891 HISTORY OF UTERINE SCAR FROM PREVIOUS SURGERY: Chronic | ICD-10-CM

## 2023-06-12 DIAGNOSIS — R91.1 SOLITARY PULMONARY NODULE: ICD-10-CM

## 2023-06-12 PROCEDURE — 71250 CT THORAX DX C-: CPT

## 2023-06-12 PROCEDURE — 71250 CT THORAX DX C-: CPT | Mod: 26,MH

## 2023-06-14 ENCOUNTER — APPOINTMENT (OUTPATIENT)
Dept: THORACIC SURGERY | Facility: CLINIC | Age: 78
End: 2023-06-14
Payer: MEDICARE

## 2023-06-14 DIAGNOSIS — Z86.73 PERSONAL HISTORY OF TRANSIENT ISCHEMIC ATTACK (TIA), AND CEREBRAL INFARCTION W/OUT RESIDUAL DEFICITS: ICD-10-CM

## 2023-06-14 LAB — FUNGUS SPT CULT: NORMAL

## 2023-06-14 PROCEDURE — 99443: CPT | Mod: 95

## 2023-06-14 RX ORDER — ACETAMINOPHEN AND CODEINE 300; 30 MG/1; MG/1
300-30 TABLET ORAL
Qty: 10 | Refills: 0 | Status: DISCONTINUED | COMMUNITY
Start: 2022-01-07 | End: 2023-06-14

## 2023-06-14 RX ORDER — AZITHROMYCIN 500 MG/1
500 TABLET, FILM COATED ORAL DAILY
Qty: 7 | Refills: 0 | Status: DISCONTINUED | COMMUNITY
Start: 2023-05-12 | End: 2023-06-14

## 2023-06-14 RX ORDER — PREDNISONE 10 MG/1
10 TABLET ORAL
Qty: 21 | Refills: 0 | Status: DISCONTINUED | COMMUNITY
Start: 2023-05-17 | End: 2023-06-14

## 2023-06-15 ENCOUNTER — NON-APPOINTMENT (OUTPATIENT)
Age: 78
End: 2023-06-15

## 2023-06-15 ENCOUNTER — LABORATORY RESULT (OUTPATIENT)
Age: 78
End: 2023-06-15

## 2023-06-15 ENCOUNTER — TRANSCRIPTION ENCOUNTER (OUTPATIENT)
Age: 78
End: 2023-06-15

## 2023-06-17 NOTE — HISTORY OF PRESENT ILLNESS
[FreeTextEntry1] : Ms. MARIBEL GIBBS, 78 year old female, former smoker (1/4 PPD x 55 years; Quit 2016), w/ hx of anxiety, PVD, non-Hodgkin's lymphoma (on Rituxin since April 2022 managed by Dr. Martinez Onc MD of [facility]), hx of TIA's (on Plavix), moderate COPD, abnormal Chest CT w/ pulmonary nodules, TIA (40 years ago) on Plavix, & Covid-19 infection (10/2022 s/p MAB @ [facility]). Established care with Dr. Frausto in March, 2023. Lung images consistent with inflammatory disease. s/p GI and ENT evaluation. laryngoscopy WNL; Evaluated by SLP: laryngoscopy is WNL. Referred for evaluation regarding biopsy for definitive diagnosis of /BOOP. 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\par \par PET/CT Dotatate on 5/5/23: \par - Bilateral pulmonary opacities with low level activity, for example: \par * RUL, 2.8 x 1.8 cm; SUV 1.6 (Image 44)\par *LLL, 0.9 x 0.7 cm; SUV 5.7 (Image 64)\par - Mildly Dotatate avid bilateral hilar foci:\par *Right, SUV 2 (Image 114)\par * Left, SV 2.1 (Image 120)\par - Left inginal node, 1.6 x 0.7; SUV 2, Image 251\par - Dotatate avid osseous cavarial lesions\par *Right Frontal; SUVmax 2.3 (Image 32)\par *Left Frontal; SUVmax 1.9 (Image 24)\par \par CT Chest on 5/12/23: \par - Resolved previously new left upper lobe opacity and new peripheral groundglass and consolidation in both lungs. The migratory pattern over multiple prior studies raises the possibility of organizing or eosinophilic pneumonia\par \par s/p Immunological blood work on 5/15/23 @ [facility]. \par - Chlamydia Pneumoniae 1:64 (H)\par - Bordetella pertussis igG Antibody: 2.55 (H)\par - Mycoplasma Pneumoniae \par *Mycoplasma IgG Ab EIA: 3.31 (H)\par *Mycoplasma IgG Interp: Positive \par \par Seen on 05/31/2023: She has recently completed Prednisone last week. I would like her to repeat CT chest in 2-3 for re-evaluation since she just finished taper I am reluctant to do a biopsy at this time,  with the last scan as a guide. \par \par Patient contacted office on 06/07/2023 with c/o of cough, advised to repeat CT Chest and follow up via TTM.\par \par CT Chest on 6/12/23:\par - secretions within right lower lobe segmental airways\par - new patchy mixed groundglass and consolidative opacity in the anterior left upper lobe (image 74, series 2) and tree-in-bud nodules (images 61-68, series 2) (Of note, radiology report states "right", radiology was emailed to correct/amend report).\par - interval resolution of multifocal opacities in the B/L upper lobes and right lower lobe. \par \par Patient presents today follow up via tele visit.  She reports that since completing course of steroids, her cough has resumed.  She stated that she had a dry cough which started about 5 days ago.  She denies any fever, chills, night sweats, wheezing and chest pain.

## 2023-06-17 NOTE — ASSESSMENT
[FreeTextEntry1] : Ms. MARIBEL GIBBS, 78 year old female, former smoker (1/4 PPD x 55 years; Quit 2016), w/ hx of anxiety, PVD, non-Hodgkin's lymphoma (on Rituxin since April 2022 managed by Dr. Martinez Onc MD of [facility]), hx of TIA's (on Plavix), moderate COPD, abnormal Chest CT w/ pulmonary nodules, TIA (40 years ago) on Plavix, & Covid-19 infection (10/2022 s/p MAB @ [facility]). Established care with Dr. Frausto in March, 2023. Lung images consistent with inflammatory disease. s/p GI and ENT evaluation. laryngoscopy WNL; Evaluated by SLP: laryngoscopy is WNL. Referred by Dr. Frausto for evaluation regarding biopsy for definitive diagnosis of /BOOP. \par \par She has recently completed Prednisone last week. I recommended to repeat CT chest in 2-3 for re-evaluation since she just finished taper I am reluctant to do a biopsy at this time, with the last scan as a guide. \par \par Patient contacted office on 06/07/2023 with c/o of cough, advised to repeat CT Chest and follow up via TTM.\par \par She presents today for follow up via tele visit with CT.\par \par CT Chest on 6/12/23:\par - secretions within right lower lobe segmental airways\par - new patchy mixed groundglass and consolidative opacity in the anterior left upper lobe (image 74, series 2) and tree-in-bud nodules (images 61-68, series 2) (Of note, radiology report states "right", radiology was emailed to correct/amend report).\par - interval resolution of multifocal opacities in the B/L upper lobes and right lower lobe. \par \par I have independently reviewed the medical records and imaging at the time of this office consultation. \par \par Recommendations reviewed with patient during this office visit, and all questions answered; Patient instructed on the importance of follow up and verbalizes understanding. \par CT Scan was reviewed which showed resolution of previous groundglass and consolidative opacities, but mentions new TERA patchy mixed groundglass and consolidative opacity and because she is symptomatic,  I have recommended that she have a biopsy of the new left upper lobe nodules.  She is in agreement with this plan.  Please schedule for left VATS, robotic assisted left upper lobe wedge resection.  The risks, benefits, and alternatives to the procedure were discussed with the patient at length. She verbalized understanding, and is in agreement with the above treatment plan.  Prior to surgery she will require medical clearance.  She was also instructed to hold Plavix for 5 days prior to surgery and to avoid taking any steroids prior to surgery.  She verbalized understanding. \par \par I personally performed the services described in the documentation, reviewed the documentation recorded by the scribe in my presence and it accurately and completely records my words and actions.\par \par I, Ciara Kang NP, am scribing for and the presence of Dr. Del Toro, the following sections HISTORY OF PRESENT ILLNESS, PAST MEDICAL/FAMILY/SOCIAL HISTORY; REVIEW OF SYSTEMS; VITAL SIGNS; PHYSICAL EXAM; DISPOSITION.\par  \par  \par

## 2023-06-18 LAB — BACTERIA SPT CULT: NORMAL

## 2023-06-19 ENCOUNTER — TRANSCRIPTION ENCOUNTER (OUTPATIENT)
Age: 78
End: 2023-06-19

## 2023-06-20 ENCOUNTER — TRANSCRIPTION ENCOUNTER (OUTPATIENT)
Age: 78
End: 2023-06-20

## 2023-06-22 ENCOUNTER — OUTPATIENT (OUTPATIENT)
Dept: OUTPATIENT SERVICES | Facility: HOSPITAL | Age: 78
LOS: 1 days | End: 2023-06-22
Payer: MEDICARE

## 2023-06-22 VITALS
TEMPERATURE: 98 F | RESPIRATION RATE: 18 BRPM | HEIGHT: 63 IN | OXYGEN SATURATION: 97 % | WEIGHT: 145.95 LBS | HEART RATE: 80 BPM | SYSTOLIC BLOOD PRESSURE: 125 MMHG | DIASTOLIC BLOOD PRESSURE: 75 MMHG

## 2023-06-22 DIAGNOSIS — R91.1 SOLITARY PULMONARY NODULE: ICD-10-CM

## 2023-06-22 DIAGNOSIS — Z98.891 HISTORY OF UTERINE SCAR FROM PREVIOUS SURGERY: Chronic | ICD-10-CM

## 2023-06-22 DIAGNOSIS — J44.9 CHRONIC OBSTRUCTIVE PULMONARY DISEASE, UNSPECIFIED: ICD-10-CM

## 2023-06-22 DIAGNOSIS — Z98.890 OTHER SPECIFIED POSTPROCEDURAL STATES: Chronic | ICD-10-CM

## 2023-06-22 DIAGNOSIS — Z86.73 PERSONAL HISTORY OF TRANSIENT ISCHEMIC ATTACK (TIA), AND CEREBRAL INFARCTION WITHOUT RESIDUAL DEFICITS: ICD-10-CM

## 2023-06-22 LAB
ANION GAP SERPL CALC-SCNC: 14 MMOL/L — SIGNIFICANT CHANGE UP (ref 7–14)
BLD GP AB SCN SERPL QL: NEGATIVE — SIGNIFICANT CHANGE UP
BUN SERPL-MCNC: 13 MG/DL — SIGNIFICANT CHANGE UP (ref 7–23)
CALCIUM SERPL-MCNC: 10.2 MG/DL — SIGNIFICANT CHANGE UP (ref 8.4–10.5)
CHLORIDE SERPL-SCNC: 105 MMOL/L — SIGNIFICANT CHANGE UP (ref 98–107)
CO2 SERPL-SCNC: 23 MMOL/L — SIGNIFICANT CHANGE UP (ref 22–31)
CREAT SERPL-MCNC: 0.63 MG/DL — SIGNIFICANT CHANGE UP (ref 0.5–1.3)
EGFR: 90 ML/MIN/1.73M2 — SIGNIFICANT CHANGE UP
GLUCOSE SERPL-MCNC: 119 MG/DL — HIGH (ref 70–99)
HCT VFR BLD CALC: 32.3 % — LOW (ref 34.5–45)
HGB BLD-MCNC: 10.3 G/DL — LOW (ref 11.5–15.5)
MCHC RBC-ENTMCNC: 27.2 PG — SIGNIFICANT CHANGE UP (ref 27–34)
MCHC RBC-ENTMCNC: 31.9 GM/DL — LOW (ref 32–36)
MCV RBC AUTO: 85.4 FL — SIGNIFICANT CHANGE UP (ref 80–100)
NRBC # BLD: 0 /100 WBCS — SIGNIFICANT CHANGE UP (ref 0–0)
NRBC # FLD: 0 K/UL — SIGNIFICANT CHANGE UP (ref 0–0)
PLATELET # BLD AUTO: 398 K/UL — SIGNIFICANT CHANGE UP (ref 150–400)
POTASSIUM SERPL-MCNC: 4 MMOL/L — SIGNIFICANT CHANGE UP (ref 3.5–5.3)
POTASSIUM SERPL-SCNC: 4 MMOL/L — SIGNIFICANT CHANGE UP (ref 3.5–5.3)
RBC # BLD: 3.78 M/UL — LOW (ref 3.8–5.2)
RBC # FLD: 14.2 % — SIGNIFICANT CHANGE UP (ref 10.3–14.5)
RH IG SCN BLD-IMP: NEGATIVE — SIGNIFICANT CHANGE UP
SODIUM SERPL-SCNC: 142 MMOL/L — SIGNIFICANT CHANGE UP (ref 135–145)
WBC # BLD: 6.21 K/UL — SIGNIFICANT CHANGE UP (ref 3.8–10.5)
WBC # FLD AUTO: 6.21 K/UL — SIGNIFICANT CHANGE UP (ref 3.8–10.5)

## 2023-06-22 PROCEDURE — 93010 ELECTROCARDIOGRAM REPORT: CPT

## 2023-06-22 RX ORDER — LANSOPRAZOLE 15 MG/1
1 CAPSULE, DELAYED RELEASE ORAL
Qty: 0 | Refills: 0 | DISCHARGE

## 2023-06-22 RX ORDER — ACETAMINOPHEN 500 MG
2 TABLET ORAL
Qty: 0 | Refills: 0 | DISCHARGE

## 2023-06-22 RX ORDER — SODIUM CHLORIDE 9 MG/ML
1000 INJECTION, SOLUTION INTRAVENOUS
Refills: 0 | Status: DISCONTINUED | OUTPATIENT
Start: 2023-06-27 | End: 2023-06-28

## 2023-06-22 NOTE — H&P PST ADULT - NSICDXPASTSURGICALHX_GEN_ALL_CORE_FT
PAST SURGICAL HISTORY:  S/P  section 4    S/P lumpectomy, left breast     S/P ORIF (open reduction internal fixation) fracture

## 2023-06-22 NOTE — H&P PST ADULT - HISTORY OF PRESENT ILLNESS
79 y/o female presents to PST preop for robotic left video assisted thoracoscopy, left upper lobe wedge resection. Pt reports SOB on exertion and chronic cough. A Chest CT revealed pulmonary nodules.

## 2023-06-22 NOTE — H&P PST ADULT - NSICDXPASTMEDICALHX_GEN_ALL_CORE_FT
PAST MEDICAL HISTORY:  Abnormal chest CT     Chronic cough     COPD (chronic obstructive pulmonary disease)     COVID-19 virus infection     Dense breast     Former smoker     GERD (gastroesophageal reflux disease)     History of TIAs 2 long ago    Non Hodgkin's lymphoma     Polyneuropathy     Pulmonary nodules     PVD (peripheral vascular disease)

## 2023-06-22 NOTE — H&P PST ADULT - PROBLEM SELECTOR PLAN 1
preop for robotic left video assisted thoracoscopy, left upper lobe wedge resection on 6/27/23  preop instructions given, pt verbalized understanding  pt will take prescribed famotidine AM of surgery for GI prophylaxis   chlorhexidine wash provided  cbc, bmp, type pending   cardiac evaluation requested- 77 y/o female with dyspnea on exertion, METS <4 & abnormal EKG going for intermediate risk surgery.  ECHO and comparison EKG requested  pt will continue prescribed antibiotics of Amoxicillin

## 2023-06-22 NOTE — H&P PST ADULT - ASSESSMENT
77 y/o female presents to PST preop for robotic left video assisted thoracoscopy, left upper lobe wedge resection. Pt reports SOB on exertion and chronic cough. A Chest CT revealed pulmonary nodules.

## 2023-06-26 ENCOUNTER — TRANSCRIPTION ENCOUNTER (OUTPATIENT)
Age: 78
End: 2023-06-26

## 2023-06-26 NOTE — ASU PATIENT PROFILE, ADULT - HAVE YOU HAD COVID IN THE LAST 60 DAYS?
MEDICARE WELLNESS VISIT NOTE      HISTORY OF PRESENT ILLNESS:   Enma Maldonado presents for her Subsequent Annual Medicare Wellness Visit.   She is saying that her osteoarthritis in the knee has been acting up again.  Is currently going to physical therapy for treatment for that     Patient Care Team:  Chetan Hickey MD as PCP - General (Internal Medicine)        Patient Active Problem List    Diagnosis Date Noted   • Benign essential hypertension 12/09/2016     Priority: Low   • Bilateral primary osteoarthritis of knee 11/18/2016     Priority: Low   • Anxiety 11/18/2016     Priority: Low   • Essential hypertension 11/18/2016     Priority: Low   • Routine adult health maintenance 11/18/2016     Priority: Low         Past Medical History:   Diagnosis Date   • Anxiety    • Depression    • Esophageal reflux    • Essential (primary) hypertension    • Malignant neoplasm          Past Surgical History:   Procedure Laterality Date   • APPENDECTOMY     • HYSTERECTOMY           Social History   Substance Use Topics   • Smoking status: Former Smoker     Quit date: 8/18/1978   • Smokeless tobacco: Never Used   • Alcohol use No     History   Drug Use No     Family History - Reviewed    Current Outpatient Prescriptions   Medication Sig Dispense Refill   • hydrochlorothiazide (HYDRODIURIL) 12.5 MG tablet Take 1 tablet by mouth daily. 30 tablet 1   • Probiotic Product (ACIDOPHILUS PEARLS PO) Take by mouth daily.     • cephALEXin (KEFLEX) 500 MG capsule Take 500 mg by mouth 4 times daily.     • ALPRAZolam (XANAX) 0.25 MG tablet Take 0.25 mg by mouth nightly as needed for Sleep.     • ranitidine (ZANTAC) 150 MG tablet Take 150 mg by mouth 2 times daily.     • DISPENSE Epimune Complex     • BIOTIN PO Take 10,000 mcg by mouth daily.     • DISPENSE cataplex G     • DISPENSE Lutein and Zeaxanthin     • DISPENSE Balanced B-10     • DISPENSE ostrophin PMG     • calcium carbonate-cholecalciferol 600-800 MG-UNIT tablet      • KRILL OIL  No OMEGA-3 PO      • DISPENSE Catalyn 5870     • citalopram (CELEXA) 20 MG tablet Take 20 mg by mouth daily.     • LORazepam (ATIVAN) 0.5 MG tablet Take 1 tablet by mouth every 6 hours as needed for Anxiety. 10 tablet 0     No current facility-administered medications for this visit.         Medicare Health Risk Assessment in electronic health record reviewed.  The following items were identified and addressed:    No risks were identified  Vision and hearing screens documented:  No pathology  Advanced Directive: Patient doesn't have an Advance Directives document - document was given today  Cognitive Assessment: no evidence of cognitive dysfunction by direct observation    Needed Screening/Treatment:   Cardiovascular screening - Lipids   And fasting glucose  Needed follow up:  None    See orders   See Patient Instructions section  No Follow-up on file.  MEDICARE WELLNESS VISIT NOTE      HISTORY OF PRESENT ILLNESS:   Enma Maldonado presents for her Subsequent Annual Medicare Wellness Visit.   She says now and then her arthritis in her knees flare up.   She is going to physical therapy for that     Patient Care Team:  Chetan Hickey MD as PCP - General (Internal Medicine)        Patient Active Problem List    Diagnosis Date Noted   • Benign essential hypertension 12/09/2016     Priority: Low   • Bilateral primary osteoarthritis of knee 11/18/2016     Priority: Low   • Anxiety 11/18/2016     Priority: Low   • Essential hypertension 11/18/2016     Priority: Low   • Routine adult health maintenance 11/18/2016     Priority: Low         Past Medical History:   Diagnosis Date   • Anxiety    • Depression    • Esophageal reflux    • Essential (primary) hypertension    • Malignant neoplasm (CMS/HCC)          Past Surgical History:   Procedure Laterality Date   • APPENDECTOMY     • HYSTERECTOMY           Social History   Substance Use Topics   • Smoking status: Former Smoker     Quit date: 8/18/1978   • Smokeless tobacco: Never  Used   • Alcohol use No     History   Drug Use No     Family History - Reviewed    Current Outpatient Prescriptions   Medication Sig Dispense Refill   • cyanocobalamin (VITAMIN B-12) 500 MCG tablet Take 500 mcg by mouth daily.     • aspirin 325 MG tablet Take 325 mg by mouth as needed for Pain.     • Coenzyme Q10 (COQ-10 PO)      • hydrochlorothiazide (HYDRODIURIL) 12.5 MG tablet Take 1 tablet by mouth daily. 30 tablet 1   • Probiotic Product (ACIDOPHILUS PEARLS PO) Take by mouth daily.     • cephALEXin (KEFLEX) 500 MG capsule Take 500 mg by mouth 4 times daily.     • ALPRAZolam (XANAX) 0.25 MG tablet Take 0.25 mg by mouth nightly as needed for Sleep.     • ranitidine (ZANTAC) 150 MG tablet Take 150 mg by mouth 2 times daily.     • DISPENSE Epimune Complex     • BIOTIN PO Take 10,000 mcg by mouth daily.     • DISPENSE cataplex G     • DISPENSE Lutein and Zeaxanthin     • DISPENSE Balanced B-10     • DISPENSE ostrophin PMG     • calcium carbonate-cholecalciferol 600-800 MG-UNIT tablet      • KRILL OIL OMEGA-3 PO      • DISPENSE Catalyn 2160     • citalopram (CELEXA) 20 MG tablet Take 20 mg by mouth daily.     • LORazepam (ATIVAN) 0.5 MG tablet Take 1 tablet by mouth every 6 hours as needed for Anxiety. 10 tablet 0     No current facility-administered medications for this visit.         Medicare Health Risk Assessment in electronic health record reviewed.  The following items were identified and addressed:    No risks were identified  Vision and hearing screens documented:  No pathology  Advanced Directive: Patient doesn't have an Advance Directives document - document was given today  Cognitive Assessment: no evidence of cognitive dysfunction by direct observation    Needed Screening/Treatment:   none  Needed follow up:  None    Knee osteoarthritis   Voltaren gel prescription  4grams four times a day prn pain       Wellness visit  Patient is refusing to have any vaccinations she said that she just wants from her  Medicare wellness visit to have her cholesterol checked as well as a glucose level checked    Recheck blood pressure which came down, near her goal  for her age ,patient says that when she usually comes to the office visits its  higher because she is nervous she said she will recheck the pressure at home and let us know if its still elevated, refill of the blood pressure medication given to patient    See orders   See Patient Instructions section  Return in about 1 year (around 5/17/2018) for Medicare Wellness Visit.

## 2023-06-26 NOTE — ASU PATIENT PROFILE, ADULT - FALL HARM RISK - HARM RISK INTERVENTIONS

## 2023-06-27 ENCOUNTER — RESULT REVIEW (OUTPATIENT)
Age: 78
End: 2023-06-27

## 2023-06-27 ENCOUNTER — APPOINTMENT (OUTPATIENT)
Dept: THORACIC SURGERY | Facility: HOSPITAL | Age: 78
End: 2023-06-27

## 2023-06-27 ENCOUNTER — INPATIENT (INPATIENT)
Facility: HOSPITAL | Age: 78
LOS: 0 days | Discharge: HOME | End: 2023-06-28
Attending: THORACIC SURGERY (CARDIOTHORACIC VASCULAR SURGERY) | Admitting: THORACIC SURGERY (CARDIOTHORACIC VASCULAR SURGERY)
Payer: MEDICARE

## 2023-06-27 ENCOUNTER — TRANSCRIPTION ENCOUNTER (OUTPATIENT)
Age: 78
End: 2023-06-27

## 2023-06-27 VITALS
DIASTOLIC BLOOD PRESSURE: 54 MMHG | WEIGHT: 145.06 LBS | RESPIRATION RATE: 18 BRPM | HEART RATE: 89 BPM | TEMPERATURE: 99 F | SYSTOLIC BLOOD PRESSURE: 146 MMHG | HEIGHT: 63 IN | OXYGEN SATURATION: 96 %

## 2023-06-27 DIAGNOSIS — Z98.890 OTHER SPECIFIED POSTPROCEDURAL STATES: Chronic | ICD-10-CM

## 2023-06-27 DIAGNOSIS — R91.1 SOLITARY PULMONARY NODULE: ICD-10-CM

## 2023-06-27 DIAGNOSIS — Z98.891 HISTORY OF UTERINE SCAR FROM PREVIOUS SURGERY: Chronic | ICD-10-CM

## 2023-06-27 LAB
GRAM STN FLD: SIGNIFICANT CHANGE UP
RH IG SCN BLD-IMP: NEGATIVE — SIGNIFICANT CHANGE UP
SPECIMEN SOURCE: SIGNIFICANT CHANGE UP

## 2023-06-27 PROCEDURE — 71045 X-RAY EXAM CHEST 1 VIEW: CPT | Mod: 26

## 2023-06-27 PROCEDURE — 99233 SBSQ HOSP IP/OBS HIGH 50: CPT

## 2023-06-27 PROCEDURE — S2900 ROBOTIC SURGICAL SYSTEM: CPT | Mod: NC

## 2023-06-27 PROCEDURE — 32666 THORACOSCOPY W/WEDGE RESECT: CPT | Mod: AS

## 2023-06-27 PROCEDURE — 32666 THORACOSCOPY W/WEDGE RESECT: CPT

## 2023-06-27 PROCEDURE — 88307 TISSUE EXAM BY PATHOLOGIST: CPT | Mod: 26

## 2023-06-27 DEVICE — STAPLER COVIDIEN TRI-STAPLE 60MM PURPLE RELOAD: Type: IMPLANTABLE DEVICE | Site: LEFT | Status: FUNCTIONAL

## 2023-06-27 DEVICE — CHEST DRAIN THORACIC ARGYLE PVC 24FR STRAIGHT: Type: IMPLANTABLE DEVICE | Site: LEFT | Status: FUNCTIONAL

## 2023-06-27 RX ORDER — FAMOTIDINE 10 MG/ML
1 INJECTION INTRAVENOUS
Refills: 0 | DISCHARGE

## 2023-06-27 RX ORDER — ONDANSETRON 8 MG/1
4 TABLET, FILM COATED ORAL EVERY 6 HOURS
Refills: 0 | Status: DISCONTINUED | OUTPATIENT
Start: 2023-06-27 | End: 2023-06-28

## 2023-06-27 RX ORDER — BUDESONIDE, GLYCOPYRROLATE, AND FORMOTEROL FUMARATE 160; 9; 4.8 UG/1; UG/1; UG/1
2 AEROSOL, METERED RESPIRATORY (INHALATION)
Refills: 0 | DISCHARGE

## 2023-06-27 RX ORDER — ALBUTEROL 90 UG/1
2.5 AEROSOL, METERED ORAL ONCE
Refills: 0 | Status: COMPLETED | OUTPATIENT
Start: 2023-06-27 | End: 2023-06-27

## 2023-06-27 RX ORDER — SENNA PLUS 8.6 MG/1
2 TABLET ORAL AT BEDTIME
Refills: 0 | Status: DISCONTINUED | OUTPATIENT
Start: 2023-06-27 | End: 2023-06-28

## 2023-06-27 RX ORDER — SODIUM CHLORIDE 9 MG/ML
4 INJECTION INTRAMUSCULAR; INTRAVENOUS; SUBCUTANEOUS EVERY 6 HOURS
Refills: 0 | Status: DISCONTINUED | OUTPATIENT
Start: 2023-06-27 | End: 2023-06-28

## 2023-06-27 RX ORDER — DIPHENHYDRAMINE HCL 50 MG
50 CAPSULE ORAL EVERY 4 HOURS
Refills: 0 | Status: DISCONTINUED | OUTPATIENT
Start: 2023-06-27 | End: 2023-06-28

## 2023-06-27 RX ORDER — HEPARIN SODIUM 5000 [USP'U]/ML
5000 INJECTION INTRAVENOUS; SUBCUTANEOUS EVERY 8 HOURS
Refills: 0 | Status: DISCONTINUED | OUTPATIENT
Start: 2023-06-27 | End: 2023-06-28

## 2023-06-27 RX ORDER — NALOXONE HYDROCHLORIDE 4 MG/.1ML
0.1 SPRAY NASAL
Refills: 0 | Status: DISCONTINUED | OUTPATIENT
Start: 2023-06-27 | End: 2023-06-28

## 2023-06-27 RX ORDER — ALBUTEROL 90 UG/1
1 AEROSOL, METERED ORAL EVERY 4 HOURS
Refills: 0 | Status: DISCONTINUED | OUTPATIENT
Start: 2023-06-27 | End: 2023-06-27

## 2023-06-27 RX ORDER — ACETAMINOPHEN 500 MG
1000 TABLET ORAL ONCE
Refills: 0 | Status: COMPLETED | OUTPATIENT
Start: 2023-06-27 | End: 2023-06-27

## 2023-06-27 RX ORDER — HYDROMORPHONE HYDROCHLORIDE 2 MG/ML
0.5 INJECTION INTRAMUSCULAR; INTRAVENOUS; SUBCUTANEOUS
Refills: 0 | Status: DISCONTINUED | OUTPATIENT
Start: 2023-06-27 | End: 2023-06-28

## 2023-06-27 RX ORDER — DORNASE ALFA 1 MG/ML
2.5 SOLUTION RESPIRATORY (INHALATION) DAILY
Refills: 0 | Status: DISCONTINUED | OUTPATIENT
Start: 2023-06-27 | End: 2023-06-28

## 2023-06-27 RX ORDER — ACETAMINOPHEN 500 MG
975 TABLET ORAL ONCE
Refills: 0 | Status: COMPLETED | OUTPATIENT
Start: 2023-06-27 | End: 2023-06-27

## 2023-06-27 RX ORDER — HEPARIN SODIUM 5000 [USP'U]/ML
5000 INJECTION INTRAVENOUS; SUBCUTANEOUS ONCE
Refills: 0 | Status: COMPLETED | OUTPATIENT
Start: 2023-06-27 | End: 2023-06-27

## 2023-06-27 RX ORDER — DIAZEPAM 5 MG
1 TABLET ORAL
Qty: 0 | Refills: 0 | DISCHARGE

## 2023-06-27 RX ORDER — HYDROMORPHONE HYDROCHLORIDE 2 MG/ML
30 INJECTION INTRAMUSCULAR; INTRAVENOUS; SUBCUTANEOUS
Refills: 0 | Status: DISCONTINUED | OUTPATIENT
Start: 2023-06-27 | End: 2023-06-28

## 2023-06-27 RX ORDER — IPRATROPIUM/ALBUTEROL SULFATE 18-103MCG
3 AEROSOL WITH ADAPTER (GRAM) INHALATION EVERY 6 HOURS
Refills: 0 | Status: DISCONTINUED | OUTPATIENT
Start: 2023-06-27 | End: 2023-06-28

## 2023-06-27 RX ORDER — FAMOTIDINE 10 MG/ML
40 INJECTION INTRAVENOUS AT BEDTIME
Refills: 0 | Status: DISCONTINUED | OUTPATIENT
Start: 2023-06-27 | End: 2023-06-28

## 2023-06-27 RX ADMIN — Medication 3 MILLILITER(S): at 21:01

## 2023-06-27 RX ADMIN — HEPARIN SODIUM 5000 UNIT(S): 5000 INJECTION INTRAVENOUS; SUBCUTANEOUS at 21:23

## 2023-06-27 RX ADMIN — ALBUTEROL 2.5 MILLIGRAM(S): 90 AEROSOL, METERED ORAL at 14:01

## 2023-06-27 RX ADMIN — SODIUM CHLORIDE 30 MILLILITER(S): 9 INJECTION, SOLUTION INTRAVENOUS at 20:25

## 2023-06-27 RX ADMIN — FAMOTIDINE 40 MILLIGRAM(S): 10 INJECTION INTRAVENOUS at 21:22

## 2023-06-27 RX ADMIN — Medication 1000 MILLIGRAM(S): at 22:53

## 2023-06-27 RX ADMIN — HEPARIN SODIUM 5000 UNIT(S): 5000 INJECTION INTRAVENOUS; SUBCUTANEOUS at 13:43

## 2023-06-27 RX ADMIN — SODIUM CHLORIDE 30 MILLILITER(S): 9 INJECTION, SOLUTION INTRAVENOUS at 14:00

## 2023-06-27 RX ADMIN — Medication 975 MILLIGRAM(S): at 13:43

## 2023-06-27 RX ADMIN — HYDROMORPHONE HYDROCHLORIDE 30 MILLILITER(S): 2 INJECTION INTRAMUSCULAR; INTRAVENOUS; SUBCUTANEOUS at 20:25

## 2023-06-27 RX ADMIN — SENNA PLUS 2 TABLET(S): 8.6 TABLET ORAL at 21:22

## 2023-06-27 RX ADMIN — SODIUM CHLORIDE 4 MILLILITER(S): 9 INJECTION INTRAMUSCULAR; INTRAVENOUS; SUBCUTANEOUS at 21:00

## 2023-06-27 RX ADMIN — Medication 400 MILLIGRAM(S): at 22:23

## 2023-06-27 NOTE — ASU PREOP CHECKLIST - NOTHING BY MOUTH SINCE
Reason:  cough, dyspnea ,CP

Procedure Date:  12/09/2018   

Accession Number:  [id number] / [id number]                    

Procedure:  XR  - Chest 2 View X-Ray CPT Code:  50853

 

FULL RESULT:

 

 

EXAM:

CHEST RADIOGRAPHY

 

EXAM DATE: 12/9/2018 03:00 AM.

 

CLINICAL HISTORY: Cough, dyspnea ,CP.

 

COMPARISON: CHEST 2 VIEW PA/LAT 01/26/2016 12:30 AM.

 

TECHNIQUE: 2 views.

 

FINDINGS:

Lungs/Pleura: No focal opacities evident. No pleural effusion. No 

pneumothorax. Normal volumes.

 

Mediastinum: Heart and mediastinal contours are unremarkable.

 

Other: Stable pectus deformity.

IMPRESSION: No evidence of acute cardiopulmonary disease.

 

RADIA 26-Jun-2023 22:00

## 2023-06-27 NOTE — PROGRESS NOTE ADULT - SUBJECTIVE AND OBJECTIVE BOX
CHIEF COMPLAINT: FOLLOW UP IN ICU FOR POSTOPERATIVE CARE OF PATIENT WHO IS S/P       PROCEDURES:       ISSUES:     Lung nodule  Postoperative pain  Chest tube in place  COPD   TIA  GERD  Former smoker  Non-Hodgkin's lymphoma  Peripheral vascular disease  Polyneuropathy    INTERVAL EVENTS:   OR today. Extubated in OR. Transferred to CTICU.      HISTORY:   Patient reports moderate pain at chest wall incision sites which is worse with coughing and deep breathing without associated fever or dyspnea. Pain is improved with use of pain meds.       PHYSICAL EXAM:   Gen: Comfortable, No acute distress  Eyes: Sclera white, Conjunctiva normal, Eyelids normal, Pupils symmetrical   ENT: Mucous membranes moist,  ,  ,    Neck: Trachea midline,  ,  ,  ,  ,  ,    CV: Rate regular, Rhythm regular,  ,  ,    Resp: Breath sounds clear, No accessory muscles use, L chest tube in place,  ,    Abd: Soft, Non-distended, Non-tender, Bowel sounds normal,  ,  ,    Skin: Warm, No peripheral edema of lower extremities,  ,    : No novak  Neuro: Moving all 4 extremities,    Psych: A&Ox3      ASSESSMENT AND PLAN:     NEURO:  Post-operative Pain - Stable. Pain control with PCA and Tylenol IV PRN.           RESPIRATORY:  Hypoxia - Wean nasal cannula for goal O2sat above 92. Obtain CXR. Incentive spirometry. Chest PT and frequent suctioning. Continue bronchodilators. OOB to chair & ambulate w/ assistance. Continuous pulse oximetry for support & to prevent decompensation.     Chest tube – Pleurevac regulated suctioning. Monitor chest tube output.     COPD exacerbated with surgery - Short course steroids, copious amount of secretions reported during bronchoscopy in OR, continue bronchodilators           CARDIOVASCULAR:  Hemodynamically stable - Not on pressors. Continue hemodynamic monitoring.  Telemetry (medical test) - Reviewed by me today independently. Normal sinus rhythm.                RENAL:  Stable - Monitor IOs and electrolytes. Keep K above 4.0 and Mg above 2.0.              GASTROINTESTINAL:  GI prophylaxis not indicated  Zofran and Reglan IV PRN for nausea  Regular consistency diet              HEMATOLOGIC:  No signs of active bleeding. Monitor Hgb in CBC in AM  DVT prophylaxis with heparin subQ and SCDs.               INFECTIOUS DISEASE:  All surgical sites appear clean. No signs of active infection. Will monitor for fever and leukocytosis.             ENDOCRINE:  Stable – Monitor glucose fingersticks for goal 120-180.        ONCOLOGY:  Lung nodule - Improved. S/P resection. Follow up final pathology.      Pertinent clinical, laboratory, radiographic, hemodynamic, echocardiographic, respiratory data, microbiologic data and chart were reviewed by myself and analyzed frequently throughout the course of the day and night by myself.    Plan discussed at length with the CTICU staff and Attending CT Surgeon -   Dr Adalid Del Toro    Patient's status was discussed with patient at bedside.    ________________________________________________        _________________________  VITAL SIGNS:  Vital Signs Last 24 Hrs  T(C): 37.2 (27 Jun 2023 11:57), Max: 37.2 (27 Jun 2023 11:57)  T(F): 99 (27 Jun 2023 11:57), Max: 99 (27 Jun 2023 11:57)  HR: 89 (27 Jun 2023 11:57) (89 - 89)  BP: 146/54 (27 Jun 2023 11:57) (146/54 - 146/54)  BP(mean): --  RR: 18 (27 Jun 2023 11:57) (18 - 18)  SpO2: 96% (27 Jun 2023 11:57) (96% - 96%)      I/Os:   I&O's Detail          MEDICATIONS:  MEDICATIONS  (STANDING):  albuterol    90 MICROgram(s) HFA Inhaler 1 Puff(s) Inhalation every 4 hours  albuterol/ipratropium for Nebulization 3 milliLiter(s) Nebulizer every 6 hours  dornase mariluz Solution 2.5 milliGRAM(s) Inhalation daily  famotidine    Tablet 40 milliGRAM(s) Oral at bedtime  HYDROmorphone PCA (1 mG/mL) 30 milliLiter(s) PCA Continuous PCA Continuous  lactated ringers. 1000 milliLiter(s) (30 mL/Hr) IV Continuous <Continuous>  senna 2 Tablet(s) Oral at bedtime  sodium chloride 3%  Inhalation 4 milliLiter(s) Inhalation every 6 hours    MEDICATIONS  (PRN):  diphenhydrAMINE Injectable 50 milliGRAM(s) IV Push every 4 hours PRN Pruritus  HYDROmorphone PCA (1 mG/mL) Rescue Clinician Bolus 0.5 milliGRAM(s) IV Push every 15 minutes PRN for Pain Scale GREATER THAN 6  naloxone Injectable 0.1 milliGRAM(s) IV Push every 3 minutes PRN For ANY of the following changes in patient status:  A. RR LESS THAN 10 breaths per minute, B. Oxygen saturation LESS THAN 90%, C. Sedation score of 6  ondansetron Injectable 4 milliGRAM(s) IV Push every 6 hours PRN Nausea      LABS:  Laboratory data was independently reviewed by me today.                       RADIOLOGY:   Radiology images were independently reviewed by me today. Reports were reviewed by me today.           CHIEF COMPLAINT: FOLLOW UP IN ICU FOR POSTOPERATIVE CARE OF PATIENT WHO IS S/P TERA wedge resection      PROCEDURES:     Flexible Bronchoscopy, Robotic assisted Left VATS, Left Upper Lobe Wedge Resection 27-Jun-2023  ISSUES:     Lung nodule  Postoperative pain  Chest tube in place  COPD   TIA on plavix  GERD  Former smoker  Non-Hodgkin's lymphoma  Peripheral vascular disease on plavix  Polyneuropathy    INTERVAL EVENTS:   OR today. Extubated in OR. Transferred to CTICU.      HISTORY:   Patient reports moderate pain at chest wall incision sites which is worse with coughing and deep breathing without associated fever or dyspnea. Pain is improved with use of pain meds.       PHYSICAL EXAM:   Gen: Comfortable, No acute distress  Eyes: Sclera white, Conjunctiva normal, Eyelids normal, Pupils symmetrical   ENT: Mucous membranes moist,  ,  ,    Neck: Trachea midline,  ,  ,  ,  ,  ,    CV: Rate regular, Rhythm regular,  ,  ,    Resp: Breath sounds clear, No accessory muscles use, L chest tube in place,  ,    Abd: Soft, Non-distended, Non-tender, Bowel sounds normal,  ,  ,    Skin: Warm, No peripheral edema of lower extremities,  ,    : No novak  Neuro: Moving all 4 extremities,    Psych: A&Ox3      ASSESSMENT AND PLAN:     NEURO:  Post-operative Pain - Stable. Pain control with PCA and Tylenol IV PRN.           RESPIRATORY:  Hypoxia - Wean nasal cannula for goal O2sat above 92. Obtain CXR. Incentive spirometry. Chest PT and frequent suctioning. Continue bronchodilators. OOB to chair & ambulate w/ assistance. Continuous pulse oximetry for support & to prevent decompensation.     Chest tube – Pleurevac regulated suctioning. Monitor chest tube output.     COPD exacerbated with surgery - Short course steroids, copious amount of secretions reported during bronchoscopy in OR, continue bronchodilators           CARDIOVASCULAR:  Hemodynamically stable - Not on pressors. Continue hemodynamic monitoring.  Telemetry (medical test) - Reviewed by me today independently. Normal sinus rhythm.                RENAL:  Stable - Monitor IOs and electrolytes. Keep K above 4.0 and Mg above 2.0.              GASTROINTESTINAL:  GI prophylaxis not indicated  Zofran and Reglan IV PRN for nausea  Regular consistency diet              HEMATOLOGIC:  No signs of active bleeding. Monitor Hgb in CBC in AM  DVT prophylaxis with heparin subQ and SCDs.               INFECTIOUS DISEASE:  All surgical sites appear clean. No signs of active infection. Will monitor for fever and leukocytosis.             ENDOCRINE:  Stable – Monitor glucose fingersticks for goal 120-180.        ONCOLOGY:  Lung nodule - Improved. S/P resection. Follow up final pathology.      Pertinent clinical, laboratory, radiographic, hemodynamic, echocardiographic, respiratory data, microbiologic data and chart were reviewed by myself and analyzed frequently throughout the course of the day and night by myself.    Plan discussed at length with the CTICU staff and Attending CT Surgeon -   Dr Adalid Del Toro    Patient's status was discussed with patient at bedside.    ________________________________________________        _________________________  VITAL SIGNS:  Vital Signs Last 24 Hrs  T(C): 37.2 (27 Jun 2023 11:57), Max: 37.2 (27 Jun 2023 11:57)  T(F): 99 (27 Jun 2023 11:57), Max: 99 (27 Jun 2023 11:57)  HR: 89 (27 Jun 2023 11:57) (89 - 89)  BP: 146/54 (27 Jun 2023 11:57) (146/54 - 146/54)  BP(mean): --  RR: 18 (27 Jun 2023 11:57) (18 - 18)  SpO2: 96% (27 Jun 2023 11:57) (96% - 96%)      I/Os:   I&O's Detail          MEDICATIONS:  MEDICATIONS  (STANDING):  albuterol    90 MICROgram(s) HFA Inhaler 1 Puff(s) Inhalation every 4 hours  albuterol/ipratropium for Nebulization 3 milliLiter(s) Nebulizer every 6 hours  dornase mariluz Solution 2.5 milliGRAM(s) Inhalation daily  famotidine    Tablet 40 milliGRAM(s) Oral at bedtime  HYDROmorphone PCA (1 mG/mL) 30 milliLiter(s) PCA Continuous PCA Continuous  lactated ringers. 1000 milliLiter(s) (30 mL/Hr) IV Continuous <Continuous>  senna 2 Tablet(s) Oral at bedtime  sodium chloride 3%  Inhalation 4 milliLiter(s) Inhalation every 6 hours    MEDICATIONS  (PRN):  diphenhydrAMINE Injectable 50 milliGRAM(s) IV Push every 4 hours PRN Pruritus  HYDROmorphone PCA (1 mG/mL) Rescue Clinician Bolus 0.5 milliGRAM(s) IV Push every 15 minutes PRN for Pain Scale GREATER THAN 6  naloxone Injectable 0.1 milliGRAM(s) IV Push every 3 minutes PRN For ANY of the following changes in patient status:  A. RR LESS THAN 10 breaths per minute, B. Oxygen saturation LESS THAN 90%, C. Sedation score of 6  ondansetron Injectable 4 milliGRAM(s) IV Push every 6 hours PRN Nausea      LABS:  Laboratory data was independently reviewed by me today.                       RADIOLOGY:   Radiology images were independently reviewed by me today. Reports were reviewed by me today.

## 2023-06-27 NOTE — BRIEF OPERATIVE NOTE - COMMENTS
EFRAÍN Dominguez provided direct first assist support to the surgeon during this surgical procedure. My involvement included positioning, prepping and draping the patient prior to surgery,  robotic port placement, Robotic docking, robotic instrument insertion and removal, ensuring clear visibility and exposure for the surgeon by using instruments such as retractors, suction and sponges, retrieving specimens from the operative field, closing surgical incisions, undocking the robot, stapling tissues and vessels, and dressing wounds. As well as other tasks as directed by the surgeon.

## 2023-06-27 NOTE — BRIEF OPERATIVE NOTE - NSICDXBRIEFPROCEDURE_GEN_ALL_CORE_FT
PROCEDURES:  Wedge resection, lung, upper lobe, left, robot-assisted 27-Jun-2023 16:13:05  Víctor Freed

## 2023-06-27 NOTE — PATIENT PROFILE ADULT - FALL HARM RISK - HARM RISK INTERVENTIONS

## 2023-06-27 NOTE — ASU PREOP CHECKLIST - ANTIBIOTIC
Detail Level: Generalized Include Location In Plan?: No Detail Level: Simple Detail Level: Detailed augmentin/yes

## 2023-06-27 NOTE — BRIEF OPERATIVE NOTE - OPERATION/FINDINGS
Flexible Bronchoscopy, Robotic assisted Left VATS, Left Upper Lobe Wedge Resection. Hemostasis achieved, incisions closed.

## 2023-06-27 NOTE — BRIEF OPERATIVE NOTE - PRIMARY SURGEON
08/09/21 0900   Daily Treatment   Symptoms Review Activity Group 0900 - 1000   Affect Anxious   Mood Anxious   Thought Process Ruminating   Psychosis None   Symptom Notation Pt reported weekend was \"good\". Pt reported her mood has been \"fine\".Pt noted feeling \"exhausted quickly\" from doing organizational tasks this weekend. Pt reported urges to use drugs this morning, but pt reported she did not because \"I'm giving the medication a chance\".   Psychosocial Stressors Health   Sleep Report Good   Hours Slept 9   Meals Dinner   Goal Complete / Activity Goal for the weekend was to recharge.   Treatment Plan Continue treatment plan   Patient Response Attentive;Interactive   Comment Pt completed symptom rating sheet    RN Monitoring of Pain, Safety, and Relapse   Safety Concerns None   Physical Concerns 8   Pain Concerns 8   Use of Street Drugs or Alcohol No   Taking Medications as Prescribed Yes     Pt total: 4  Kalyn Arenas [facility]   Dr. Del Toro

## 2023-06-28 ENCOUNTER — TRANSCRIPTION ENCOUNTER (OUTPATIENT)
Age: 78
End: 2023-06-28

## 2023-06-28 VITALS
RESPIRATION RATE: 19 BRPM | HEART RATE: 90 BPM | SYSTOLIC BLOOD PRESSURE: 122 MMHG | OXYGEN SATURATION: 94 % | DIASTOLIC BLOOD PRESSURE: 57 MMHG

## 2023-06-28 LAB
ANION GAP SERPL CALC-SCNC: 10 MMOL/L — SIGNIFICANT CHANGE UP (ref 7–14)
BUN SERPL-MCNC: 14 MG/DL — SIGNIFICANT CHANGE UP (ref 7–23)
CALCIUM SERPL-MCNC: 8.8 MG/DL — SIGNIFICANT CHANGE UP (ref 8.4–10.5)
CHLORIDE SERPL-SCNC: 103 MMOL/L — SIGNIFICANT CHANGE UP (ref 98–107)
CO2 SERPL-SCNC: 25 MMOL/L — SIGNIFICANT CHANGE UP (ref 22–31)
CREAT SERPL-MCNC: 0.53 MG/DL — SIGNIFICANT CHANGE UP (ref 0.5–1.3)
EGFR: 94 ML/MIN/1.73M2 — SIGNIFICANT CHANGE UP
GLUCOSE SERPL-MCNC: 125 MG/DL — HIGH (ref 70–99)
HCT VFR BLD CALC: 30.9 % — LOW (ref 34.5–45)
HGB BLD-MCNC: 9.7 G/DL — LOW (ref 11.5–15.5)
MAGNESIUM SERPL-MCNC: 2.1 MG/DL — SIGNIFICANT CHANGE UP (ref 1.6–2.6)
MCHC RBC-ENTMCNC: 27.3 PG — SIGNIFICANT CHANGE UP (ref 27–34)
MCHC RBC-ENTMCNC: 31.4 GM/DL — LOW (ref 32–36)
MCV RBC AUTO: 87 FL — SIGNIFICANT CHANGE UP (ref 80–100)
NIGHT BLUE STAIN TISS: SIGNIFICANT CHANGE UP
NRBC # BLD: 0 /100 WBCS — SIGNIFICANT CHANGE UP (ref 0–0)
NRBC # FLD: 0 K/UL — SIGNIFICANT CHANGE UP (ref 0–0)
PHOSPHATE SERPL-MCNC: 3.9 MG/DL — SIGNIFICANT CHANGE UP (ref 2.5–4.5)
PLATELET # BLD AUTO: 285 K/UL — SIGNIFICANT CHANGE UP (ref 150–400)
POTASSIUM SERPL-MCNC: 4.2 MMOL/L — SIGNIFICANT CHANGE UP (ref 3.5–5.3)
POTASSIUM SERPL-SCNC: 4.2 MMOL/L — SIGNIFICANT CHANGE UP (ref 3.5–5.3)
RBC # BLD: 3.55 M/UL — LOW (ref 3.8–5.2)
RBC # FLD: 14.3 % — SIGNIFICANT CHANGE UP (ref 10.3–14.5)
SODIUM SERPL-SCNC: 138 MMOL/L — SIGNIFICANT CHANGE UP (ref 135–145)
SPECIMEN SOURCE: SIGNIFICANT CHANGE UP
WBC # BLD: 7.52 K/UL — SIGNIFICANT CHANGE UP (ref 3.8–10.5)
WBC # FLD AUTO: 7.52 K/UL — SIGNIFICANT CHANGE UP (ref 3.8–10.5)

## 2023-06-28 PROCEDURE — 99233 SBSQ HOSP IP/OBS HIGH 50: CPT

## 2023-06-28 PROCEDURE — 71045 X-RAY EXAM CHEST 1 VIEW: CPT | Mod: 26

## 2023-06-28 RX ORDER — SENNA PLUS 8.6 MG/1
2 TABLET ORAL
Qty: 0 | Refills: 0 | DISCHARGE
Start: 2023-06-28

## 2023-06-28 RX ORDER — HYDROMORPHONE HYDROCHLORIDE 2 MG/ML
0.2 INJECTION INTRAMUSCULAR; INTRAVENOUS; SUBCUTANEOUS EVERY 6 HOURS
Refills: 0 | Status: DISCONTINUED | OUTPATIENT
Start: 2023-06-28 | End: 2023-06-28

## 2023-06-28 RX ORDER — DIAZEPAM 5 MG
5 TABLET ORAL DAILY
Refills: 0 | Status: DISCONTINUED | OUTPATIENT
Start: 2023-06-28 | End: 2023-06-28

## 2023-06-28 RX ORDER — CLOPIDOGREL BISULFATE 75 MG/1
1 TABLET, FILM COATED ORAL
Qty: 0 | Refills: 0 | DISCHARGE

## 2023-06-28 RX ORDER — LIDOCAINE 4 G/100G
1 CREAM TOPICAL EVERY 24 HOURS
Refills: 0 | Status: DISCONTINUED | OUTPATIENT
Start: 2023-06-28 | End: 2023-06-28

## 2023-06-28 RX ORDER — ALBUMIN HUMAN 25 %
250 VIAL (ML) INTRAVENOUS ONCE
Refills: 0 | Status: COMPLETED | OUTPATIENT
Start: 2023-06-28 | End: 2023-06-28

## 2023-06-28 RX ORDER — ACETAMINOPHEN 500 MG
2 TABLET ORAL
Qty: 0 | Refills: 0 | DISCHARGE

## 2023-06-28 RX ORDER — ACETAMINOPHEN 500 MG
650 TABLET ORAL EVERY 4 HOURS
Refills: 0 | Status: DISCONTINUED | OUTPATIENT
Start: 2023-06-28 | End: 2023-06-28

## 2023-06-28 RX ORDER — CLOPIDOGREL BISULFATE 75 MG/1
1 TABLET, FILM COATED ORAL
Qty: 0 | Refills: 0 | DISCHARGE
Start: 2023-06-28

## 2023-06-28 RX ORDER — HYDROMORPHONE HYDROCHLORIDE 2 MG/ML
1 INJECTION INTRAMUSCULAR; INTRAVENOUS; SUBCUTANEOUS
Refills: 0 | Status: DISCONTINUED | OUTPATIENT
Start: 2023-06-28 | End: 2023-06-28

## 2023-06-28 RX ORDER — CLOPIDOGREL BISULFATE 75 MG/1
75 TABLET, FILM COATED ORAL ONCE
Refills: 0 | Status: COMPLETED | OUTPATIENT
Start: 2023-06-28 | End: 2023-06-28

## 2023-06-28 RX ORDER — ACETAMINOPHEN 500 MG
650 TABLET ORAL EVERY 6 HOURS
Refills: 0 | Status: DISCONTINUED | OUTPATIENT
Start: 2023-06-28 | End: 2023-06-28

## 2023-06-28 RX ORDER — HYDROMORPHONE HYDROCHLORIDE 2 MG/ML
2 INJECTION INTRAMUSCULAR; INTRAVENOUS; SUBCUTANEOUS
Refills: 0 | Status: DISCONTINUED | OUTPATIENT
Start: 2023-06-28 | End: 2023-06-28

## 2023-06-28 RX ORDER — OXYCODONE HYDROCHLORIDE 5 MG/1
5 TABLET ORAL
Refills: 0 | Status: DISCONTINUED | OUTPATIENT
Start: 2023-06-28 | End: 2023-06-28

## 2023-06-28 RX ADMIN — CLOPIDOGREL BISULFATE 75 MILLIGRAM(S): 75 TABLET, FILM COATED ORAL at 15:20

## 2023-06-28 RX ADMIN — Medication 125 MILLILITER(S): at 07:04

## 2023-06-28 RX ADMIN — SODIUM CHLORIDE 30 MILLILITER(S): 9 INJECTION, SOLUTION INTRAVENOUS at 08:10

## 2023-06-28 RX ADMIN — SODIUM CHLORIDE 4 MILLILITER(S): 9 INJECTION INTRAMUSCULAR; INTRAVENOUS; SUBCUTANEOUS at 04:44

## 2023-06-28 RX ADMIN — Medication 650 MILLIGRAM(S): at 11:25

## 2023-06-28 RX ADMIN — SODIUM CHLORIDE 4 MILLILITER(S): 9 INJECTION INTRAMUSCULAR; INTRAVENOUS; SUBCUTANEOUS at 11:37

## 2023-06-28 RX ADMIN — HEPARIN SODIUM 5000 UNIT(S): 5000 INJECTION INTRAVENOUS; SUBCUTANEOUS at 05:18

## 2023-06-28 RX ADMIN — Medication 3 MILLILITER(S): at 04:44

## 2023-06-28 RX ADMIN — Medication 3 MILLILITER(S): at 11:36

## 2023-06-28 RX ADMIN — DORNASE ALFA 2.5 MILLIGRAM(S): 1 SOLUTION RESPIRATORY (INHALATION) at 11:37

## 2023-06-28 RX ADMIN — HEPARIN SODIUM 5000 UNIT(S): 5000 INJECTION INTRAVENOUS; SUBCUTANEOUS at 13:11

## 2023-06-28 RX ADMIN — Medication 5 MILLIGRAM(S): at 11:24

## 2023-06-28 RX ADMIN — ONDANSETRON 4 MILLIGRAM(S): 8 TABLET, FILM COATED ORAL at 11:25

## 2023-06-28 NOTE — DISCHARGE NOTE PROVIDER - NSDCMRMEDTOKEN_GEN_ALL_CORE_FT
Breztri Aerosphere inhalation aerosol: 2 puff(s) inhaled 2 times a day  diazePAM 5 mg oral tablet: 1 tab(s) orally once a day, As Needed  famotidine 40 mg oral tablet: 1 tab(s) orally once a day (at bedtime)  ipratropium bromide inhalation solution 3 times a day:   Plavix 75 mg oral tablet: 1 tab(s) orally once a day  predniSONE 5 mg oral tablet: 4 tab(s) orally once a day from 6/29-- July2  predniSONE 5 mg oral tablet: 2 tab(s) orally once a day from July 3 till july 7  predniSONE 5 mg oral tablet: 1 tab(s) orally once a day from july 8- july 12  senna leaf extract oral tablet: 2 tab(s) orally once a day (at bedtime) as needed for  constipation  Tylenol 325 mg oral tablet: 2 orally every 4 hours as needed for  mild pain   Breztri Aerosphere inhalation aerosol: 2 puff(s) inhaled 2 times a day  clopidogrel 75 mg oral tablet: 1 tab(s) orally once  diazePAM 5 mg oral tablet: 1 tab(s) orally once a day, As Needed  famotidine 40 mg oral tablet: 1 tab(s) orally once a day (at bedtime)  ipratropium bromide inhalation solution 3 times a day:   predniSONE 5 mg oral tablet: 4 tab(s) orally once a day from 6/29-- July2  predniSONE 5 mg oral tablet: 2 tab(s) orally once a day from July 3 till july 7  predniSONE 5 mg oral tablet: 1 tab(s) orally once a day from july 8- july 12  senna leaf extract oral tablet: 2 tab(s) orally once a day (at bedtime) as needed for  constipation  Tylenol 325 mg oral tablet: 2 orally every 4 hours as needed for  mild pain

## 2023-06-28 NOTE — PHYSICAL THERAPY INITIAL EVALUATION ADULT - PERTINENT HX OF CURRENT PROBLEM, REHAB EVAL
78 year old female presents to [facility] for robotic left video assisted thoracoscopy, left upper lobe wedge resection. Pt reports SOB on exertion and chronic cough. A Chest CT revealed pulmonary nodules.

## 2023-06-28 NOTE — DISCHARGE NOTE PROVIDER - NSDCFUADDINST_GEN_ALL_CORE_FT
Please keep surgical dressings dry and intact for two days, then on Friday you may remove dressing and shower daily. Leave steri strips intact. Please call Dr Del Toro's office if you develop fever, shortness of breath, chest pain, or wound redness/foul drainage

## 2023-06-28 NOTE — DISCHARGE NOTE PROVIDER - HOSPITAL COURSE
HPI:  77 y/o female presents to [facility] preop for robotic left video assisted thoracoscopy, left upper lobe wedge resection. Pt reports SOB on exertion and chronic cough. A Chest CT revealed pulmonary nodules.   (22 Jun 2023 07:37). On 6/27/2023 pt underwent Bronchoscopy, Robotic Assisted TERA Wedge resection. She tolerated the procedure well. A small apical PTX was noted after chest tube removal on POD # 1 which was repeated. Pt looks well, denies SOB, has room air sat"s in the low to mid 90's with rest and ambulation, however when she sleeps he sats drop to 86-88%. Pt states she has been advised by her pulmonologist to have a sleep apnea work up, on more than one occasion, but has refused. She was an appointment with her pulmonologist on July 6. D/w Dr Del Toro. Pt looks well and wants to go home. Dr Del Toro agrees.   HPI:  77 y/o female presents to [facility] preop for robotic left video assisted thoracoscopy, left upper lobe wedge resection. Pt reports SOB on exertion and chronic cough. A Chest CT revealed pulmonary nodules.   (22 Jun 2023 07:37). On 6/27/2023 pt underwent Bronchoscopy, Robotic Assisted TERA Wedge resection. She tolerated the procedure well. A small apical PTX was noted after chest tube removal on POD # 1 which was repeated. Pt looks well, denies SOB, has room air sat"s in the low to mid 90's with rest and ambulation, however when she sleeps he sats drop to 86-88%. Pt states she has been advised by her pulmonologist to have a sleep apnea work up, on more than one occasion, but has refused. She was an appointment with her pulmonologist on July 6. D/w Dr Del Toro. Pt looks well and wants to go home. Dr Del Toro and DR Frausto agreeagrees.   HPI:  79 y/o female presents to [facility] [place] preop for robotic left video assisted thoracoscopy, left upper lobe wedge resection. Pt reports SOB on exertion and chronic cough. A Chest CT revealed pulmonary nodules.   (22 Jun 2023 07:37). On 6/27/2023 pt underwent Bronchoscopy, Robotic Assisted TERA Wedge resection. She tolerated the procedure well. A small apical PTX was noted after chest tube removal on POD # 1 which was repeated. Pt looks well, denies SOB, has room air sat"s in the low to mid 90's with rest and ambulation, however when she sleeps he sats drop to 86-88%. Pt states she has been advised by her pulmonologist to have a sleep apnea work up, on more than one occasion, but has refused. She was an appointment with her pulmonologist on July 6. D/w Dr Del Toro. Pt looks well and wants to go home. Dr Del Toro and DR Frausto agree. Pt is adamant about going home.    HPI:  77 y/o female presents to [facility] preop for robotic left video assisted thoracoscopy, left upper lobe wedge resection. Pt reports SOB on exertion and chronic cough. A Chest CT revealed pulmonary nodules.   (22 Jun 2023 07:37). On 6/27/2023 pt underwent Bronchoscopy, Robotic Assisted TERA Wedge resection. She tolerated the procedure well. A small apical PTX was noted after chest tube removal on POD # 1 which was repeated. Pt looks well, denies SOB, has room air sat"s in the low to mid 90's with rest and ambulation, however when she sleeps he sats drop to 86-88%. Pt states she has been advised by her pulmonologist to have a sleep apnea work up, on more than one occasion, but has refused. She was an appointment with her pulmonologist on July 6. D/w Dr Del Toro. Pt looks well and wants to go home. Pt is adament about going home.  Dr Del Toro and DR Frausto agree. Pt is adamant about going home.    HPI:  77 y/o female presents to [facility] preop for robotic left video assisted thoracoscopy, left upper lobe wedge resection. Pt reports SOB on exertion and chronic cough. A Chest CT revealed pulmonary nodules.   (22 Jun 2023 07:37). On 6/27/2023 pt underwent Bronchoscopy, Robotic Assisted TERA Wedge resection. She tolerated the procedure well. A small apical PTX was noted after chest tube removal on POD # 1 which was repeated. Pt looks well, denies SOB, has room air sat"s in the low to mid 90's with rest and ambulation, however when she sleeps he sats drop to 86-88%. Pt states she has been advised by her pulmonologist to have a sleep apnea work up, on more than one occasion, but has refused. She was an appointment with her pulmonologist on July 6. D/w Dr Del Toro. Pt looks well and wants to go home. Pt is adament about going home.  Dr Del Toro and DR Frausto agree. Pt is adamant about going home.

## 2023-06-28 NOTE — DISCHARGE NOTE PROVIDER - NSDCCPTREATMENT_GEN_ALL_CORE_FT
PRINCIPAL PROCEDURE  Procedure: Robotic assistance in thoracoscopic procedure  Findings and Treatment: Bronchoscopy, Robotic LVATS, TERA Wedge resection

## 2023-06-28 NOTE — PHYSICAL THERAPY INITIAL EVALUATION ADULT - IMPAIRED TRANSFERS: SIT/STAND, REHAB EVAL
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 Please call pt when placed in epic. impaired balance/impaired postural control/decreased strength

## 2023-06-28 NOTE — PROGRESS NOTE ADULT - SUBJECTIVE AND OBJECTIVE BOX
MARIBEL GIBBS                     [id number]    HPI:  79 y/o female presents to PST preop for robotic left video assisted thoracoscopy, left upper lobe wedge resection. Pt reports SOB on exertion and chronic cough. A Chest CT revealed pulmonary nodules.   (2023 07:37)    CHIEF COMPLAINT: FOLLOW UP IN ICU FOR POSTOPERATIVE CARE OF PATIENT WHO IS S/P TERA wedge resection      PROCEDURES:     Flexible Bronchoscopy, Robotic assisted Left VATS, Left Upper Lobe Wedge Resection 2023  ISSUES:     Lung nodule  Postoperative pain  Chest tube in place  COPD   TIA on plavix  GERD  Former smoker  Non-Hodgkin's lymphoma  Peripheral vascular dise      PAST MEDICAL & SURGICAL HISTORY:  COPD (chronic obstructive pulmonary disease)      GERD (gastroesophageal reflux disease)      History of TIAs  2 long ago      Former smoker      Non Hodgkin's lymphoma      COVID-19 virus infection      PVD (peripheral vascular disease)      Chronic cough      Pulmonary nodules      Dense breast      Polyneuropathy      Abnormal chest CT      S/P  section  4      S/P lumpectomy, left breast      S/P ORIF (open reduction internal fixation) fracture                VITAL SIGNS:  Vital Signs Last 24 Hrs  T(C): 36.9 (2023 08:00), Max: 37.2 (2023 11:57)  T(F): 98.5 (2023 08:00), Max: 99 (2023 11:57)  HR: 79 (2023 10:00) (63 - 89)  BP: 100/57 (2023 10:00) (90/47 - 146/54)  BP(mean): 70 (2023 10:00) (61 - 105)  RR: 19 (2023 10:00) (12 - 23)  SpO2: 92% (2023 10:00) (92% - 100%)    Parameters below as of 2023 10:00  Patient On (Oxygen Delivery Method): room air        I/Os:   I&O's Detail    2023 07:01  -  2023 07:00  --------------------------------------------------------  IN:    IV PiggyBack: 100 mL    Lactated Ringers: 450 mL  Total IN: 550 mL    OUT:    Chest Tube (mL): 40 mL    Voided (mL): 575 mL  Total OUT: 615 mL    Total NET: -65 mL      2023 07:01  -  2023 10:54  --------------------------------------------------------  IN:    Albumin 5%  - 250 mL: 250 mL    Lactated Ringers: 90 mL  Total IN: 340 mL    OUT:    Chest Tube (mL): 10 mL  Total OUT: 10 mL    Total NET: 330 mL          CAPILLARY BLOOD GLUCOSE          =======================MEDICATIONS===================  MEDICATIONS  (STANDING):  acetaminophen     Tablet .. 650 milliGRAM(s) Oral every 6 hours  albuterol/ipratropium for Nebulization 3 milliLiter(s) Nebulizer every 6 hours  diazepam    Tablet 5 milliGRAM(s) Oral daily  dornase mariluz Solution 2.5 milliGRAM(s) Inhalation daily  famotidine    Tablet 40 milliGRAM(s) Oral at bedtime  heparin   Injectable 5000 Unit(s) SubCutaneous every 8 hours  lactated ringers. 1000 milliLiter(s) (30 mL/Hr) IV Continuous <Continuous>  lidocaine   4% Patch 1 Patch Transdermal every 24 hours  predniSONE   Tablet 20 milliGRAM(s) Oral daily  senna 2 Tablet(s) Oral at bedtime  sodium chloride 3%  Inhalation 4 milliLiter(s) Inhalation every 6 hours    MEDICATIONS  (PRN):  HYDROmorphone   Tablet 2 milliGRAM(s) Oral every 3 hours PRN Severe Pain (7 - 10)  HYDROmorphone   Tablet 1 milliGRAM(s) Oral every 3 hours PRN Moderate Pain (4 - 6)  HYDROmorphone  Injectable 0.2 milliGRAM(s) IV Push every 6 hours PRN Severe breakthrough pain  naloxone Injectable 0.1 milliGRAM(s) IV Push every 3 minutes PRN For ANY of the following changes in patient status:  A. RR LESS THAN 10 breaths per minute, B. Oxygen saturation LESS THAN 90%, C. Sedation score of 6  ondansetron Injectable 4 milliGRAM(s) IV Push every 6 hours PRN Nausea        PHYSICAL EXAM============================  General:                         Awake, alert, not in any distress  Neuro:                            Moving all extremities to commands.   Respiratory:	Air entry fair and  bilateral conducted sounds                                           Effort even and unlabored.  CV:		Regular rate and rhythm. Normal S1/S2                                          Distal pulses present.  Abdomen:	                     Soft, non-distended. Bowel sounds present   Skin:		No rash.  Extremities:	Warm, no cyanosis or edema.  Palpable pulses    ============================LABS=========================                        9.7    7.52  )-----------( 285      ( 2023 03:10 )             30.9     06-    138  |  103  |  14  ----------------------------<  125<H>  4.2   |  25  |  0.53    Ca    8.8      2023 03:10  Phos  3.9       Mg     2.10     -            Urinalysis Basic - ( 2023 03:10 )    Color: x / Appearance: x / SG: x / pH: x  Gluc: 125 mg/dL / Ketone: x  / Bili: x / Urobili: x   Blood: x / Protein: x / Nitrite: x   Leuk Esterase: x / RBC: x / WBC x   Sq Epi: x / Non Sq Epi: x / Bacteria: x        ASSESSMENT AND PLAN:     NEURO:  Post-operative Pain - Stable. Pain control with PCA and Tylenol IV PRN.           RESPIRATORY:  Hypoxia - Wean nasal cannula for goal O2sat above 92. on RA sats 97%.  Incentive spirometry. Chest PT and frequent suctioning. Continue bronchodilators. OOB to chair & ambulate w/ assistance. Continuous pulse oximetry for support & to prevent decompensation.     Chest tube – Pleurevac regulated suctioning. Monitor chest tube output.   COPD exacerbated with surgery - Short course steroids, copious amount of secretions reported during bronchoscopy in OR, continue bronchodilators        CARDIOVASCULAR:  Hemodynamically stable - Not on pressors. Continue hemodynamic monitoring.  Telemetry (medical test) - Reviewed by me today independently. Normal sinus rhythm.        RENAL:  Stable - Monitor IOs and electrolytes. Keep K above 4.0 and Mg above 2.0.        GASTROINTESTINAL:  GI prophylaxis not indicated  Zofran and Reglan IV PRN for nausea  Regular consistency diet          HEMATOLOGIC:  No signs of active bleeding. Monitor Hgb in CBC in AM  DVT prophylaxis with heparin subQ and SCDs.        INFECTIOUS DISEASE:  All surgical sites appear clean. No signs of active infection. Will monitor for fever and leukocytosis.      ENDOCRINE:  Stable – Monitor glucose fingersticks for goal 120-180.        ONCOLOGY:  Lung nodule - Improved. S/P resection. Follow up final pathology.      Pertinent clinical, laboratory, radiographic, hemodynamic, echocardiographic, respiratory data, microbiologic data and chart were reviewed by myself and analyzed frequently throughout the course of the day and night by myself.    Plan discussed at length with the CTICU staff and Attending CT Surgeon -   Dr Adalid Del Toro    Patient's status was discussed with patient at bedside.      Jaya WILLSP     MARIBEL GIBBS                     [id number]    HPI:  77 y/o female presents to PST preop for robotic left video assisted thoracoscopy, left upper lobe wedge resection. Pt reports SOB on exertion and chronic cough. A Chest CT revealed pulmonary nodules.   (2023 07:37)    CHIEF COMPLAINT: FOLLOW UP IN ICU FOR POSTOPERATIVE CARE OF PATIENT WHO IS S/P TERA wedge resection      PROCEDURES:     Flexible Bronchoscopy, Robotic assisted Left VATS, Left Upper Lobe Wedge Resection 2023  ISSUES:     Lung nodule  Postoperative pain  Chest tube in place  COPD   TIA on plavix  GERD  Former smoker  Non-Hodgkin's lymphoma  Peripheral vascular dise      PAST MEDICAL & SURGICAL HISTORY:  COPD (chronic obstructive pulmonary disease)      GERD (gastroesophageal reflux disease)      History of TIAs  2 long ago      Former smoker      Non Hodgkin's lymphoma      COVID-19 virus infection      PVD (peripheral vascular disease)      Chronic cough      Pulmonary nodules      Dense breast      Polyneuropathy      Abnormal chest CT      S/P  section  4      S/P lumpectomy, left breast      S/P ORIF (open reduction internal fixation) fracture                VITAL SIGNS:  Vital Signs Last 24 Hrs  T(C): 36.9 (2023 08:00), Max: 37.2 (2023 11:57)  T(F): 98.5 (2023 08:00), Max: 99 (2023 11:57)  HR: 79 (2023 10:00) (63 - 89)  BP: 100/57 (2023 10:00) (90/47 - 146/54)  BP(mean): 70 (2023 10:00) (61 - 105)  RR: 19 (2023 10:00) (12 - 23)  SpO2: 92% (2023 10:00) (92% - 100%)    Parameters below as of 2023 10:00  Patient On (Oxygen Delivery Method): room air        I/Os:   I&O's Detail    2023 07:01  -  2023 07:00  --------------------------------------------------------  IN:    IV PiggyBack: 100 mL    Lactated Ringers: 450 mL  Total IN: 550 mL    OUT:    Chest Tube (mL): 40 mL    Voided (mL): 575 mL  Total OUT: 615 mL    Total NET: -65 mL      2023 07:01  -  2023 10:54  --------------------------------------------------------  IN:    Albumin 5%  - 250 mL: 250 mL    Lactated Ringers: 90 mL  Total IN: 340 mL    OUT:    Chest Tube (mL): 10 mL  Total OUT: 10 mL    Total NET: 330 mL          CAPILLARY BLOOD GLUCOSE          =======================MEDICATIONS===================  MEDICATIONS  (STANDING):  acetaminophen     Tablet .. 650 milliGRAM(s) Oral every 6 hours  albuterol/ipratropium for Nebulization 3 milliLiter(s) Nebulizer every 6 hours  diazepam    Tablet 5 milliGRAM(s) Oral daily  dornase mariluz Solution 2.5 milliGRAM(s) Inhalation daily  famotidine    Tablet 40 milliGRAM(s) Oral at bedtime  heparin   Injectable 5000 Unit(s) SubCutaneous every 8 hours  lactated ringers. 1000 milliLiter(s) (30 mL/Hr) IV Continuous <Continuous>  lidocaine   4% Patch 1 Patch Transdermal every 24 hours  predniSONE   Tablet 20 milliGRAM(s) Oral daily  senna 2 Tablet(s) Oral at bedtime  sodium chloride 3%  Inhalation 4 milliLiter(s) Inhalation every 6 hours    MEDICATIONS  (PRN):  HYDROmorphone   Tablet 2 milliGRAM(s) Oral every 3 hours PRN Severe Pain (7 - 10)  HYDROmorphone   Tablet 1 milliGRAM(s) Oral every 3 hours PRN Moderate Pain (4 - 6)  HYDROmorphone  Injectable 0.2 milliGRAM(s) IV Push every 6 hours PRN Severe breakthrough pain  naloxone Injectable 0.1 milliGRAM(s) IV Push every 3 minutes PRN For ANY of the following changes in patient status:  A. RR LESS THAN 10 breaths per minute, B. Oxygen saturation LESS THAN 90%, C. Sedation score of 6  ondansetron Injectable 4 milliGRAM(s) IV Push every 6 hours PRN Nausea        PHYSICAL EXAM============================  General:                         Awake, alert, not in any distress  Neuro:                            Moving all extremities to commands.   Respiratory:	Air entry fair and  bilateral conducted sounds                                           Effort even and unlabored.  CV:		Regular rate and rhythm. Normal S1/S2                                          Distal pulses present.  Abdomen:	                     Soft, non-distended. Bowel sounds present   Skin:		No rash.  Extremities:	Warm, no cyanosis or edema.  Palpable pulses    ============================LABS=========================                        9.7    7.52  )-----------( 285      ( 2023 03:10 )             30.9     06-    138  |  103  |  14  ----------------------------<  125<H>  4.2   |  25  |  0.53    Ca    8.8      2023 03:10  Phos  3.9       Mg     2.10     -            Urinalysis Basic - ( 2023 03:10 )    Color: x / Appearance: x / SG: x / pH: x  Gluc: 125 mg/dL / Ketone: x  / Bili: x / Urobili: x   Blood: x / Protein: x / Nitrite: x   Leuk Esterase: x / RBC: x / WBC x   Sq Epi: x / Non Sq Epi: x / Bacteria: x        ASSESSMENT AND PLAN:     NEURO:  Post-operative Pain - Stable. Pain control with PCA and Tylenol IV PRN.           RESPIRATORY:  Hypoxia - Wean nasal cannula for goal O2sat above 92. on RA sats 97%.  Incentive spirometry. Chest PT and frequent suctioning. Continue bronchodilators. OOB to chair & ambulate w/ assistance. Continuous pulse oximetry for support & to prevent decompensation.     Chest tube – Pleurevac regulated suctioning. Monitor chest tube output.   COPD exacerbated with surgery - Short course steroids, copious amount of secretions reported during bronchoscopy in OR, continue bronchodilators        CARDIOVASCULAR:  Hemodynamically stable - Not on pressors. Continue hemodynamic monitoring.  Telemetry (medical test) - Reviewed by me today independently. Normal sinus rhythm.        RENAL:  Stable - Monitor IOs and electrolytes. Keep K above 4.0 and Mg above 2.0.        GASTROINTESTINAL:  GI prophylaxis not indicated  Zofran and Reglan IV PRN for nausea  Regular consistency diet          HEMATOLOGIC:  No signs of active bleeding. Monitor Hgb in CBC in AM  DVT prophylaxis with heparin subQ and SCDs.        INFECTIOUS DISEASE:  All surgical sites appear clean. No signs of active infection. Will monitor for fever and leukocytosis.      ENDOCRINE:  Stable – Monitor glucose fingersticks for goal 120-180.        ONCOLOGY:  Lung nodule - Improved. S/P resection. Follow up final pathology.      Pertinent clinical, laboratory, radiographic, hemodynamic, echocardiographic, respiratory data, microbiologic data and chart were reviewed by myself and analyzed frequently throughout the course of the day and night by myself.    Plan discussed at length with the CTICU staff and Attending CT Surgeon -   Dr Adalid Del Toro. and Dr. Del Toro d/w Dr. Frausto to f/u w [facility] and the need for CPAP    Patient's status was discussed with patient at bedside. Pt insisted to go home after advising to stay for hypoxia when asleep.  She said she will F/U with Dr. frausto outpatient. Pt's Daughter is a nurse, and d/w daughter as well.       Jaya Smith DO, FACEP

## 2023-06-28 NOTE — DISCHARGE NOTE NURSING/CASE MANAGEMENT/SOCIAL WORK - NSDCPEFALRISK_GEN_ALL_CORE
Discharge Summary     Patient ID:  Wesley Childs  [id number]  31 y.o.  1950  Body mass index is 29.64 kg/m². PCP on record: Jean-Paul Beebe MD    Admit date: 5/3/2019  Discharge date and time: 5/6/2019    Discharge Diagnoses:                                           1 Non Ischemic cardiomyopathy EF 26 -30 %     2 HTN     3 Ca breast on chemo     4 Acute PE and DVT - on Eliquis     5 HLD     6 Bronchospastic lung disease ( COPD Vs Asthma )       Consults: Cardiology and Hematology/Oncology          Hospital Course by problems:    Patient who is under chemo treatment for ca breast and mets admitted with increasing SOB , decrease in ET. Diagnosed - acute systolic Heart failure with EF,30% , heart failure associated with chemotherapy. 1 SOB - Combination of bronchospasm + acute on chronic heart failure + Acute PE ( Acute DVT )   - improved with short course of Steroids and BD along with heart failure management with lasix , BB, ARB . patient is on Eliquis   - Now patient is asymptomatic - she is off o2 . Ambulation o2 sats above 92 % , no need for home o2     Full CODE       Patient seen and examined by me on discharge day.   Pertinent Findings:  Patient is Alert Awake and oriented   HEENT - NAD    RS - Clear , no rales no rhonchi   CVS - regular rhythm and rate acceptable    abd - benign, BS present , no Distension   EXT - no edema , no calf tenderness   Neuro - alert and awake , grossly motor and sensory intact         Significant Diagnostic Studies:  Results   CTA CHEST W OR W WO CONT (Accession [id number]) (Order [id number])   Allergies      Not Specified: Adhesive   Exam Information     Status Exam Begun  Exam Ended    Final [99] 5/02/2019 11:23 5/02/2019 12:17   Result Information     Status: Final result (Exam End: 5/2/2019 12:17) Provider Status: Reviewed   Study Result     CT Chest Pulmonary Embolism Protocol      CPT CODE: 14192     INDICATION: Patient with history of breast cancer and shortness of breath. History of metastatic disease to the chest.  Question pulmonary embolism.     TECHNIQUE: Thin collimation axial images obtained through the level of the  pulmonary arteries with additional imaging through the chest following the  uneventful administration of nonionic intravenous contrast.  Images  reconstructed into coronal and sagittal maximum intensity projections for better  evaluation of the tortuous and overlapping pulmonary vascular structures and to  reduce patient radiation dose. The patient received 70 cc of Isovue-370. All CT  scans are performed using dose optimization techniques as appropriate to the  performed exam including the following: Automated exposure control, adjustment  of mA and/or kV according to patient size, and use of iterative reconstructive  technique.      COMPARISON: 2/10/2019; PET/CT report 3/22/2019.     FINDINGS:     Limited by streak artifact from the cardiac device of the anterior right chest  wall.     There are filling defects seen in right lower lobe segmental branch vessels on  image 116, 105 and subsegmental branches on image 134. Subsegmental filling  defects of the lingula on image 110. Hazy density in the left main pulmonary  artery likely streak artifact, no definite large central embolus.     There is reflux of contrast into the intrahepatic IVC. There is no deviation of  the septum. The left ventricular to right ventricular ratio is less than 0.9     No aneurysmal dilatation of the thoracic aorta. Limited assessment but no  obvious finding for dissection. [place] Heart size is within normal limits. No  pericardial effusion.       Lymph nodes limited in visualization from the streak artifacts. Right  paratracheal nodes appear relatively similar in size from PET/CT. Right hilar  node measures 2.8 x 1.6 cm not significantly changed on a comparable image.   Subcarinal lymph node measures 1.2 cm in short axis, increased from 2/2019 where  it measured 0.7 cm.     Right upper lung nodule on image 43 measures 4.8 mm stable. Left upper lung  nodule on image 43 measures 2.4 mm, stable. 3 mm right upper lung nodule stable. Right upper lung nodule on image 76 3.5 mm stable. Secretions in the right lower  lobe bronchus possible secretions in the superior segment right lower lobe  bronchus. Infrahilar nodule on the right measures 1.6 x 1.0 cm, image 24,  previously 1.1 x 1.1 cm. .       Small bilateral pleural effusions new from 3/2019. No pneumothorax.     Visualized upper abdominal structures are without gross abnormality.     Partial imaging of hardware in the cervical spine. Left-sided Mediport. Stable  sclerosis of the manubrium. Superior endplate compression fracture deformity of  T11 grossly unchanged from PET/CT.     IMPRESSION  IMPRESSION:     1. Positive examination for pulmonary emboli.   - There is likely a combination of acute and subacute PE in the lower lobe  segmental and subsegmental branch vessels. - Limited evaluation of the upper lobe branch vessels due to streak artifact.  -Called to Ronnie Nassar NP on 5/2/19 at 1310 hours. 2. No CT evidence of right heart strain. 3. There is reflux of contrast into the intrahepatic IVC with flow mixing  artifact likely. Intrahepatic thrombus not entirely excluded. May benefit from  ultrasound correlation. 4. New small bilateral pleural effusions. 5. Grossly unchanged mediastinal lymph nodes. 6. Possible enlarging right infrahilar lymph node versus developing lung nodule.    Imaging     CTA CHEST W OR W WO CONT (Order: [id number]) - 5/2/2019   Result History     CTA CHEST W OR W WO CONT (Order #[id number]) on 5/2/2019 - Order Result History Report     ======================    Flakita Gabby   DUPLEX LOWER EXT VENOUS Chilo Dapper   Order# [id number]   Reading physician: Stepan Caldwell MD Ordering physician: Landon Zaragoza MD Study date: 5/6/19   Patient Information     Patient Name  Jada Mendosa Julio César Fuller MRN  [id number] Sex  Female     1950 (68 y.o.)   Vascular History     DUPLEX LOWER EXT VENOUS BILAT (Order #[id number]) on 19   Reason for Exam   Priority: Routine   Pulmonary Embolism   Procedure Technique     Duplex images were obtained using 2-D gray scale, color flow, and spectral Doppler analysis. Vitals     Weight Height BSA (calculated - sq m) BP Pulse (Heart Rate)          Interpretation Summary     · Acute occlusive thrombus present in the right peroneal vein. Lower Extremity Venous Findings     Right Lower Venous     Acute occlusive thrombus present in the right peroneal vein. The common femoral, greater saphenous, femoral, popliteal and posterior tibial vein(s) were imaged in the transverse and longitudinal planes. The vessels showed normal color filling and compressibility. Doppler interrogation of the veins showed phasic and spontaneous flow. The right posterior tibial artery has biphasic waveforms. Left Lower Venous     The common femoral, greater saphenous, femoral, popliteal, posterior tibial and peroneal vein(s) were imaged in the transverse and longitudinal planes. The vessels showed normal color filling and compressibility. Doppler interrogation of the veins showed phasic and spontaneous flow. The left posterior tibial artery has triphasic waveforms. Procedure Staff     Technologist/Clinician: Mikie Neff  Supporting Staff: None  Performing Physician/Midlevel: None   Communication     Preliminary Report     Preliminary report called to Raoul Simmonds., Dr. Cyndra Bless on 2019 at 09:10. Follow up instructions: Report given at bedside. [place]    PACS Images      Show images for DUPLEX LOWER EXT VENOUS BILAT   Signed     Electronically signed by Mayuri Juarez MD on 19 at 1151 EDT         Pertinent Lab Data:  Recent Labs     19  0605 19  0350 19  0514   WBC 9.3 10.9 4.4*   HGB 10.4* 10.1* 10.8*   HCT 32.7* 32.1* 33.3*  414 409     Recent Labs     05/06/19  0605 05/05/19  0350 05/04/19  0514    141 144   K 3.9 4.8 3.6    107 108   CO2 29 30 28   * 160* 156*   BUN 20* 24* 10   CREA 0.80 1.00 0.81   CA 8.9 9.4 9.3   MG  --   --  2.1   PHOS  --   --  4.1       DISCHARGE MEDICATIONS:   @  Current Discharge Medication List      START taking these medications    Details   apixaban (ELIQUIS) 5 mg tablet 2 tabs po BID for 6 days then 1 tab po BID until discontinued  Qty: 60 Tab, Refills: 0      budesonide-formoterol (SYMBICORT) 160-4.5 mcg/actuation HFAA Take 2 Puffs by inhalation two (2) times a day. Qty: 1 Inhaler, Refills: 0      digoxin (LANOXIN) 0.125 mg tablet Take 1 Tab by mouth daily. Qty: 30 Tab, Refills: 0      docusate sodium (COLACE) 100 mg capsule Take 1 Cap by mouth two (2) times daily as needed for Constipation for up to 90 days. Qty: 30 Cap, Refills: 0      furosemide (LASIX) 40 mg tablet 1 tab po daily  Qty: 30 Tab, Refills: 0      losartan (COZAAR) 50 mg tablet Take 1 Tab by mouth daily. Qty: 30 Tab, Refills: 0      naloxone (NARCAN) 0.4 mg/mL injection 1 mL by IntraVENous route as needed for Other (Give if breaths per minute  less than 10, may repeat dose in 15 min and contact MD). Qty: 1 mL, Refills: 0      oxyCODONE-acetaminophen (PERCOCET) 5-325 mg per tablet Take 1 Tab by mouth every six (6) hours as needed for Pain for up to 3 days. Max Daily Amount: 4 Tabs. Qty: 15 Tab, Refills: 0    Associated Diagnoses: Malignant neoplasm metastatic to right lung (HCC)      predniSONE (DELTASONE) 20 mg tablet Take 20 mg by mouth two (2) times daily (with meals) for 2 days. Qty: 4 Tab, Refills: 0      tiotropium (SPIRIVA) 18 mcg inhalation capsule Take 1 Cap by inhalation daily. Qty: 30 Cap, Refills: 0      potassium chloride SR (K-TAB) 20 mEq tablet Take 1 Tab by mouth daily.   Qty: 10 Tab, Refills: 0         CONTINUE these medications which have CHANGED    Details   carvedilol (COREG) 12.5 mg tablet Take 1 Tab by mouth two (2) times daily (with meals). Qty: 60 Tab, Refills: 0         CONTINUE these medications which have NOT CHANGED    Details   mometasone (NASONEX) 50 mcg/actuation nasal spray 2 Sprays by Both Nostrils route daily as needed. Indications: inflammation of the nose due to an allergy      b complex vitamins (B COMPLEX 1) tablet Take 1 Tab by mouth daily. cyanocobalamin 1,000 mcg tablet Take 3,000 mcg by mouth daily. benzonatate (TESSALON PERLES) 100 mg capsule Take 100 mg by mouth three (3) times daily as needed for Cough. Biotin 2,500 mcg cap Take 1 Cap by mouth daily. cholecalciferol, vitamin D3, 2,000 unit tab Take 1 Tab by mouth daily. omeprazole (PRILOSEC) 40 mg capsule Take 40 mg by mouth daily. albuterol-ipratropium (DUO-NEB) 2.5 mg-0.5 mg/3 ml nebu 3 mL by Nebulization route every six (6) hours as needed (wheezing or sob). File under Medicare Part B, ICD codes J44.9, C78.01  Qty: 120 Nebule, Refills: 3      umeclidinium (INCRUSE ELLIPTA) 62.5 mcg/actuation inhaler Take 1 Puff by inhalation daily. Qty: 3 Inhaler, Refills: 0      DULoxetine (CYMBALTA) 30 mg capsule Take 30 mg by mouth daily. multivitamin (ONE A DAY) tablet Take 1 Tab by mouth daily. plant stanol eliezer (CHOLEST OFF PO) Take 1 Tab by mouth daily. melatonin 10 mg tab Take 1 Tab by mouth nightly. montelukast (SINGULAIR) 10 mg tablet Take 1 Tab by mouth daily. Qty: 90 Tab, Refills: 3      raloxifene (EVISTA) 60 mg tablet Take 60 mg by mouth daily. Associated Diagnoses: Malignant neoplasm of breast (female), unspecified site         STOP taking these medications       lisinopril (PRINIVIL, ZESTRIL) 10 mg tablet Comments:   Reason for Stopping:                 My Recommended Diet, Activity, Wound Care, and follow-up labs are listed in the patient's Discharge Insturctions which I have personally completed and reviewed.       Disposition:     [x] Home with family     [] [facility] PT/RN   [] SNF/NH   [] Inpatient Rehab/CLAIRE  Condition at Discharge:  Stable    Follow up with:   PCP : Ma Scheuermann, MD      Please follow-up tests/labs that are still pendin.  None  2.    >30 minutes spent coordinating this discharge (review instructions/follow-up, prescriptions, preparing report for sign off)    Signed:  Demetrios Landau, MD  2019  4:05 PM For information on Fall & Injury Prevention, visit: [email or web address] OR  [email or web address] OR  https://www.cdc.gov/steadi/patient.html

## 2023-06-28 NOTE — DISCHARGE NOTE PROVIDER - NSDCFUSCHEDAPPT_GEN_ALL_CORE_FT
Pepe Frausto  [facility]  PULMMED [address] [facility]  Scheduled Appointment: 07/06/2023    Terrence Bell  [facility]  NEUROLOGY [address] [facility]  Scheduled Appointment: 08/28/2023

## 2023-06-28 NOTE — DISCHARGE NOTE PROVIDER - CARE PROVIDER_API CALL
Adalid Del Toro Oz  Thoracic Surgery  [id number] [place] [id number]  Phone: [phone]  Fax: [phone]  Follow Up Time:

## 2023-06-28 NOTE — PROGRESS NOTE ADULT - SUBJECTIVE AND OBJECTIVE BOX
Anesthesia Pain Management Service- Attending Addendum    SUBJECTIVE: Patient's pain control adequate    Therapy:	  [ X] IV PCA	   [ ] Epidural           [ ] s/p Spinal Opoid              [ ] Postpartum infusion	  [ ] Patient controlled regional anesthesia (PCRA)    [ ] prn Analgesics    Allergies    aspirin (Vomiting)    Intolerances    OxyCODONE Hydrochloride (Vomiting; Nausea)    MEDICATIONS  (STANDING):  acetaminophen     Tablet .. 650 milliGRAM(s) Oral every 6 hours  albuterol/ipratropium for Nebulization 3 milliLiter(s) Nebulizer every 6 hours  diazepam    Tablet 5 milliGRAM(s) Oral daily  dornase mariluz Solution 2.5 milliGRAM(s) Inhalation daily  famotidine    Tablet 40 milliGRAM(s) Oral at bedtime  heparin   Injectable 5000 Unit(s) SubCutaneous every 8 hours  predniSONE   Tablet 20 milliGRAM(s) Oral daily  senna 2 Tablet(s) Oral at bedtime  sodium chloride 3%  Inhalation 4 milliLiter(s) Inhalation every 6 hours    MEDICATIONS  (PRN):  HYDROmorphone   Tablet 2 milliGRAM(s) Oral every 3 hours PRN Severe Pain (7 - 10)  HYDROmorphone   Tablet 1 milliGRAM(s) Oral every 3 hours PRN Moderate Pain (4 - 6)  HYDROmorphone  Injectable 0.2 milliGRAM(s) IV Push every 6 hours PRN Severe breakthrough pain  naloxone Injectable 0.1 milliGRAM(s) IV Push every 3 minutes PRN For ANY of the following changes in patient status:  A. RR LESS THAN 10 breaths per minute, B. Oxygen saturation LESS THAN 90%, C. Sedation score of 6  ondansetron Injectable 4 milliGRAM(s) IV Push every 6 hours PRN Nausea      OBJECTIVE:   [X] No new signs     [ ] Other:    Side Effects:  [X ] None			[ ] Other:      ASSESSMENT/PLAN  -Discontinue current therapy    [ ] Therapy changed to:    [ ] IV PCA       [ ] Epidural     [ X] prn Analgesics     Comments: Pain management per primary team, APS to sign off

## 2023-06-28 NOTE — DISCHARGE NOTE PROVIDER - NSDCFUADDAPPT_GEN_ALL_CORE_FT
Please call Dr Del Toro's office to schedule a follow up visit in 10 days.   Please follow up with your pulmonologist for sleep apnea evaluation, appointment july 6

## 2023-06-28 NOTE — PROGRESS NOTE ADULT - SUBJECTIVE AND OBJECTIVE BOX
Anesthesia Pain Management Service    SUBJECTIVE: Pt now off IV PCA without problems reported.  Pain Score:     THERAPY:    [ ] IV PCA Morphine		[ ] 5 mg/mL	[ ] 1 mg/mL  [X ] IV PCA Hydromorphone	[ ] 5 mg/mL	[X ] 1 mg/mL  [ ] IV PCA Fentanyl		[ ] 50 micrograms/mL    Demand dose __0.2_ lockout __6_ (minutes) Continuous Rate _0__ Total: _1.4 mg used (in past 24 hours); reservoir 25.6ml    Therapy:	  [ X] IV PCA	   [ ] Epidural           [ ] s/p Spinal Opoid              [ ] Postpartum infusion	  [ ] Patient controlled regional anesthesia (PCRA)    [ ] prn Analgesics    Allergies    aspirin (Vomiting)    Intolerances    OxyCODONE Hydrochloride (Vomiting; Nausea)    MEDICATIONS  (STANDING):  acetaminophen     Tablet .. 650 milliGRAM(s) Oral every 6 hours  albuterol/ipratropium for Nebulization 3 milliLiter(s) Nebulizer every 6 hours  diazepam    Tablet 5 milliGRAM(s) Oral daily  dornase mariluz Solution 2.5 milliGRAM(s) Inhalation daily  famotidine    Tablet 40 milliGRAM(s) Oral at bedtime  heparin   Injectable 5000 Unit(s) SubCutaneous every 8 hours  predniSONE   Tablet 20 milliGRAM(s) Oral daily  senna 2 Tablet(s) Oral at bedtime  sodium chloride 3%  Inhalation 4 milliLiter(s) Inhalation every 6 hours    MEDICATIONS  (PRN):  HYDROmorphone   Tablet 2 milliGRAM(s) Oral every 3 hours PRN Severe Pain (7 - 10)  HYDROmorphone   Tablet 1 milliGRAM(s) Oral every 3 hours PRN Moderate Pain (4 - 6)  HYDROmorphone  Injectable 0.2 milliGRAM(s) IV Push every 6 hours PRN Severe breakthrough pain  naloxone Injectable 0.1 milliGRAM(s) IV Push every 3 minutes PRN For ANY of the following changes in patient status:  A. RR LESS THAN 10 breaths per minute, B. Oxygen saturation LESS THAN 90%, C. Sedation score of 6  ondansetron Injectable 4 milliGRAM(s) IV Push every 6 hours PRN Nausea      OBJECTIVE:   [X] No new signs     [ ] Other:    Side Effects:  [X ] None			[ ] Other:    Assessment of Catheter Site:		[ ] Intact		[ ] Other:    ASSESSMENT/PLAN  [ ] Continue current therapy    [X ] Therapy changed to:    [ ] IV PCA       [ ] Epidural     [ X] prn Analgesics     Comments: IV PCA discontinued by team and they ordered PRN Oral/IV opioids and/or non-opioid adjuvant analgesics to be used at this point.    Progress Note written now but Patient was seen earlier. Anesthesia Pain Management Service    SUBJECTIVE: IV PCA to be discontinued as patient is transitioned to oral opioid analgesics  Pain Score: 3/10    THERAPY:    [ ] IV PCA Morphine		[ ] 5 mg/mL	[ ] 1 mg/mL  [X ] IV PCA Hydromorphone	[ ] 5 mg/mL	[X ] 1 mg/mL  [ ] IV PCA Fentanyl		[ ] 50 micrograms/mL    Demand dose __0.2_ lockout __6_ (minutes) Continuous Rate _0__ Total: _1.4 mg used (in past 24 hours); reservoir 25.6ml    Therapy:	  [ X] IV PCA	   [ ] Epidural           [ ] s/p Spinal Opoid              [ ] Postpartum infusion	  [ ] Patient controlled regional anesthesia (PCRA)    [ ] prn Analgesics    Allergies    aspirin (Vomiting)    Intolerances    OxyCODONE Hydrochloride (Vomiting; Nausea)    MEDICATIONS  (STANDING):  acetaminophen     Tablet .. 650 milliGRAM(s) Oral every 6 hours  albuterol/ipratropium for Nebulization 3 milliLiter(s) Nebulizer every 6 hours  diazepam    Tablet 5 milliGRAM(s) Oral daily  dornase mariluz Solution 2.5 milliGRAM(s) Inhalation daily  famotidine    Tablet 40 milliGRAM(s) Oral at bedtime  heparin   Injectable 5000 Unit(s) SubCutaneous every 8 hours  predniSONE   Tablet 20 milliGRAM(s) Oral daily  senna 2 Tablet(s) Oral at bedtime  sodium chloride 3%  Inhalation 4 milliLiter(s) Inhalation every 6 hours    MEDICATIONS  (PRN):  HYDROmorphone   Tablet 2 milliGRAM(s) Oral every 3 hours PRN Severe Pain (7 - 10)  HYDROmorphone   Tablet 1 milliGRAM(s) Oral every 3 hours PRN Moderate Pain (4 - 6)  HYDROmorphone  Injectable 0.2 milliGRAM(s) IV Push every 6 hours PRN Severe breakthrough pain  naloxone Injectable 0.1 milliGRAM(s) IV Push every 3 minutes PRN For ANY of the following changes in patient status:  A. RR LESS THAN 10 breaths per minute, B. Oxygen saturation LESS THAN 90%, C. Sedation score of 6  ondansetron Injectable 4 milliGRAM(s) IV Push every 6 hours PRN Nausea      OBJECTIVE:   [X] No new signs     [ ] Other:    Side Effects:  [X ] None			[ ] Other:    Assessment of Catheter Site:		[ ] Intact		[ ] Other:    ASSESSMENT/PLAN  [ ] Continue current therapy    [X ] Therapy changed to:    [ ] IV PCA       [ ] Epidural     [ X] prn Analgesics     Comments: IV PCA discontinued by team and they ordered PRN Oral/IV opioids and/or non-opioid adjuvant analgesics to be used at this point.    Progress Note written now but Patient was seen earlier.

## 2023-06-28 NOTE — PHYSICAL THERAPY INITIAL EVALUATION ADULT - ADDITIONAL COMMENTS
Patient lives with  in private home, 3 steps to enter. Patient was independent prior to admission. Patient was not using an assistive device prior to admission.

## 2023-06-29 ENCOUNTER — APPOINTMENT (OUTPATIENT)
Dept: PULMONOLOGY | Facility: CLINIC | Age: 78
End: 2023-06-29

## 2023-06-29 PROBLEM — R05.3 CHRONIC COUGH: Chronic | Status: ACTIVE | Noted: 2023-06-22

## 2023-06-29 PROBLEM — R93.89 ABNORMAL FINDINGS ON DIAGNOSTIC IMAGING OF OTHER SPECIFIED BODY STRUCTURES: Chronic | Status: ACTIVE | Noted: 2023-06-22

## 2023-06-29 PROBLEM — Z87.891 PERSONAL HISTORY OF NICOTINE DEPENDENCE: Chronic | Status: ACTIVE | Noted: 2023-06-22

## 2023-06-29 PROBLEM — I73.9 PERIPHERAL VASCULAR DISEASE, UNSPECIFIED: Chronic | Status: ACTIVE | Noted: 2023-06-22

## 2023-06-29 PROBLEM — R91.8 OTHER NONSPECIFIC ABNORMAL FINDING OF LUNG FIELD: Chronic | Status: ACTIVE | Noted: 2023-06-22

## 2023-06-29 PROBLEM — R92.2 INCONCLUSIVE MAMMOGRAM: Chronic | Status: ACTIVE | Noted: 2023-06-22

## 2023-07-02 LAB
CULTURE RESULTS: SIGNIFICANT CHANGE UP
SPECIMEN SOURCE: SIGNIFICANT CHANGE UP

## 2023-07-03 PROBLEM — G62.9 POLYNEUROPATHY, UNSPECIFIED: Chronic | Status: ACTIVE | Noted: 2023-06-22

## 2023-07-03 PROBLEM — U07.1 COVID-19: Chronic | Status: ACTIVE | Noted: 2023-06-22

## 2023-07-03 PROBLEM — C85.90 NON-HODGKIN LYMPHOMA, UNSPECIFIED, UNSPECIFIED SITE: Chronic | Status: ACTIVE | Noted: 2023-06-22

## 2023-07-05 ENCOUNTER — APPOINTMENT (OUTPATIENT)
Dept: THORACIC SURGERY | Facility: CLINIC | Age: 78
End: 2023-07-05
Payer: MEDICARE

## 2023-07-06 ENCOUNTER — APPOINTMENT (OUTPATIENT)
Dept: PULMONOLOGY | Facility: CLINIC | Age: 78
End: 2023-07-06
Payer: MEDICARE

## 2023-07-06 VITALS
HEART RATE: 83 BPM | OXYGEN SATURATION: 97 % | DIASTOLIC BLOOD PRESSURE: 70 MMHG | TEMPERATURE: 97.2 F | BODY MASS INDEX: 24.92 KG/M2 | RESPIRATION RATE: 16 BRPM | WEIGHT: 146 LBS | SYSTOLIC BLOOD PRESSURE: 112 MMHG | HEIGHT: 64 IN

## 2023-07-06 DIAGNOSIS — R06.83 SNORING: ICD-10-CM

## 2023-07-06 PROCEDURE — 99214 OFFICE O/P EST MOD 30 MIN: CPT

## 2023-07-06 NOTE — HISTORY OF PRESENT ILLNESS
[Former] : former [< 20 pack-years] : < 20 pack-years [Never] : never [TextBox_4] : Ms. GIBBS  is a 77 year old, former smoking (quit 2016), female. She has past medical history of non-Hodgkin's lymphoma (on Rituxin since April 2022 managed by Dr. Martinez Onc MD of [facility]), moderate COPD, abnormal Chest CT w/ pulmonary nodules, TIA (40 years ago) on Plavix, & Covid-19 infection (10/2022 s/p MAB @ [facility]). She presents for an initial pulmonary evaluation. She has been followed for many years by Dr. Gomes. She presents with her , Bhargav. \par \par - s/p left VATS procedure done 6/2023\par - she notes she finished her abx and is still on her course of Prednisone\par - she notes feeling better \par - she notes waiting on the results of her biopsy\par - she notes she cant stay outside when it is too hot due to her breathing\par - she notes since changing her reflux medication it has gotten better\par - she notes eating well\par - she notes no new medications, vitamins, or supplements \par - she notes using her nasal spray for he sinuses \par -she denies any headaches, nausea, emesis, fever, chills, sweats, chest pain, chest pressure, coughing, wheezing, palpitations, constipation, diarrhea, vertigo, dysphagia, heartburn, reflux, itchy eyes, itchy ears, leg swelling, leg pain, arthralgias, myalgias, or sour taste in the mouth.  [TextBox_11] : .25 [TextBox_13] : 55 [YearQuit] : 2016

## 2023-07-06 NOTE — ASSESSMENT
[FreeTextEntry1] : Ms. GIBBS  is a 77 year old, former smoker (quit 2016), female. She has past medical history of non-Hodgkin's lymphoma (on Rituxin since April 2022 managed by Dr. Juan Payan MD of [facility]), moderate COPD, abnormal Chest CT w/ pulmonary nodules, TIA (40 years ago) on Plavix, & Covid-19 infection (10/2022 s/p MAB @ [facility]),s/p COVID-19 10/2022 complicated by abnormal CT, COPD/PNA. She presents for a f/u pulmonary evaluation for SOB, GERD, allergies/PND, ?BRANDAN, and abnormal CT c/w inflammatory dz at present - ?BOOP /  (Bx pending) \par \par The patient's SOB is felt to be multifactorial:\par -poor mechanics of breathing\par -out of shape/overweight\par -Pulmonary\par   -COPD\par   -Emphysema\par -Cardiac (Gamaliel)\par \par Problem 1: COPD/Emphysema\par -continue Breztri 2 puffs BID \par -add DuoNeb via nebulizer, Q6H (before Breztri)\par -COPD is a progressive disease and although it cant be cured, appropriate management can slow its progression, reduce frequency and severity of exacerbations, and improve symptoms and the patient quality of life. Hospitalizations are the greatest contributor to the total COPD costs and account for up to 87% of total COPD related costs. Exacerbations are the main cause of admissions and subsequently account for the 40%-75% of COPD costs. Inhaled maintenance therapy reduces the incidence of exacerbations in patients with stable CPPD. Incorrect inhaler use and nonadherence are major obstacles to achieving COPD treatments goals. Many COPD patients have challenges (impaired inhalation, limited dexterity, reduced cognition: that limit their ability to correctly use their COPD treatment devices resulting in reduced symptom control. Of most importance is smoking cessation and early intervention with respiratory illnesses and contemplation for pulmonary rehab to enhance quality of life. \par \par \par Problem 2: Allergies/PND\par -add Dymista 1 sniff BID \par -Environmental measures for allergies were encouraged including mattress and pillow cover, air purifier, and environmental controls. \par \par Problem 3: GERD (Lev Donavan) \par -continue Prevacid 30 mg QAM before breakfast\par -add Pepcid 40 mg QHS \par -Rule of 2s: avoid eating too much, eating too late, eating too spicy, eating two hours before bed.\par - Things to avoid including overeating, spicy foods, tight clothing, eating within two hours of bed, this list is not all inclusive.\par - For treatments of reflux, possible options discussed including diet control, H2 blockers, PPIs, as well as coating motility agents discussed as treatment options. Timing of meals and proximity of last meal to sleep were discussed. If symptoms persist, a formal gastrointestinal evaluation is needed. \par \par Problem 4: ?BRANDAN (risk factors: poor sleep, elevated Mallampati class)\par -get home sleep study - currently not interested - reordered \par -Sleep apnea is associated with adverse clinical consequences which can affect most organ systems. Cardiovascular disease risk includes arrhythmias, atrial fibrillation, hypertension, coronary artery disease, and stroke. Metabolic disorders include diabetes type 2, non-alcoholic fatty liver disease. Mood disorder especially depression; and cognitive decline especially in the elderly. Associations with chronic reflux/Aguilar’s esophagus some but not all inclusive. \par -Reasons include arousal consistent with hypopnea; respiratory events most prominent in REM sleep or supine position; therefore sleep staging and body position are important for accurate diagnosis and estimation of AHI. \par \par Problem 5: Abnormal CT c/w inflammatory disease - improved, overall decreased densities in TERA and left side, new (?aspiration)-though pt at risk for lung CA based on prior smoking Hx - s/p VATS bx (Alverto) - likely  / BOOP\par -continue GI evaluation (Dr. Saturnino Go)\par -s/p FEESST study WNL\par -s/p fluoroscopy of the diaphragm WNL\par -complete f/u CT 4-6 months - 10/2023 \par - Awaiting Bx result - continue Prednisone at 10mg / day \par -CAT scans are the only radiological modality to identify abnormalities w/in the lings with regards to nodules/masses/lymph nodes. Risks, benefits were reviewed in detail. The guidelines for abnormalities include follow up CT scans at various intervals which could range from 6 weeks to 1 year intervals. If there is a change for the worse then considerations for a biopsy will be considered if you are a candidate. Second opinion evaluation with thoracic surgeon or an interventional radiologist could be offered,			 \par \par Problem 6: Cardiac (Gamaliel)\par -Recommend cardiac follow up evaluation with cardiologist if needed \par \par Problem 7: overweight/out of shape\par -Recommended Jimmy York's 10-day detox diet and book.\par -Recommend "Muniq" OTC for weight loss, energy, and blood sugar\par - Weight loss, exercise and diet control were discussed and are highly encouraged. Treatment options were given such as aqua therapy, and contacting a nutritionist. Recommended to use the elliptical, stationary bike, less use of treadmill. Mindful eating was explained to the patient. Obesity is associated with worsening asthma, SOB, and potential for cardiac disease, diabetes, and other underlying medical conditions.\par \par Problem 8: Poor mechanics of breathing\par -Recommended Wim Hof and Buteyko breathing techniques\par -Recommended [email or web address] and to YouTube "aerobic exercises for seniors"\par -Proper breathing techniques were reviewed with an emphasis on exhalation. Patient instructed to breath in for 1 second and out for four seconds. Patient was encouraged not to talk while walking.\par \par Problem 9: Health Maintenance\par -recommended Sanotize anti viral nasal spray in case of viral infection\par -s/p flu shot\par -recommended strep pneumonia vaccines: Prevnar-20 vaccine, follow by Pneumo vaccine 23 one year following\par -recommended early intervention for URIs\par -recommended regular osteoporosis evaluations\par -recommended early dermatological evaluations\par -recommended after the age of 50 to the age of 70, colonoscopy every 5 years\par \par f/u in 6-8 weeks\par pt is encouraged to call or fax the office with any questions or concerns. \par

## 2023-07-06 NOTE — ADDENDUM
[FreeTextEntry1] : Documented by Hay Garcia acting as a scribe for Dr. Pepe Frausto on 07/06/2023 .\par \par All medical record entries made by the Scribe were at my, Dr. Pepe Frausto's, direction and personally dictated by me on 07/06/2023 . I have reviewed the chart and agree that the record accurately reflects my personal performance of the history, physical exam, assessment and plan. I have also personally directed, reviewed, and agree with the discharge instructions.

## 2023-07-06 NOTE — PHYSICAL EXAM
[No Acute Distress] : no acute distress [Well Nourished] : well nourished [No Deformities] : no deformities [Normal Oropharynx] : normal oropharynx [III] : Mallampati Class: III [Normal Appearance] : normal appearance [No Neck Mass] : no neck mass [Normal Rate/Rhythm] : normal rate/rhythm [Normal S1, S2] : normal s1, s2 [No Murmurs] : no murmurs [No Resp Distress] : no resp distress [Clear to Auscultation Bilaterally] : clear to auscultation bilaterally [Wheeze] : wheeze [No Abnormalities] : no abnormalities [Benign] : benign [Normal Gait] : normal gait [No Clubbing] : no clubbing [No Cyanosis] : no cyanosis [No Edema] : no edema [FROM] : FROM [Normal Color/ Pigmentation] : normal color/ pigmentation [No Focal Deficits] : no focal deficits [Normal Affect] : normal affect [Oriented x3] : oriented x3 [TextBox_68] : I:E 1:3; Clear

## 2023-07-07 ENCOUNTER — TRANSCRIPTION ENCOUNTER (OUTPATIENT)
Age: 78
End: 2023-07-07

## 2023-07-07 LAB — SURGICAL PATHOLOGY STUDY: SIGNIFICANT CHANGE UP

## 2023-07-10 RX ORDER — PREDNISONE 10 MG/1
10 TABLET ORAL DAILY
Qty: 30 | Refills: 2 | Status: ACTIVE | COMMUNITY
Start: 2023-07-06 | End: 1900-01-01

## 2023-07-11 ENCOUNTER — NON-APPOINTMENT (OUTPATIENT)
Age: 78
End: 2023-07-11

## 2023-07-14 ENCOUNTER — APPOINTMENT (OUTPATIENT)
Dept: NEUROLOGY | Facility: CLINIC | Age: 78
End: 2023-07-14
Payer: MEDICARE

## 2023-07-14 ENCOUNTER — TRANSCRIPTION ENCOUNTER (OUTPATIENT)
Age: 78
End: 2023-07-14

## 2023-07-14 VITALS
DIASTOLIC BLOOD PRESSURE: 70 MMHG | HEIGHT: 64 IN | SYSTOLIC BLOOD PRESSURE: 114 MMHG | WEIGHT: 144 LBS | BODY MASS INDEX: 24.59 KG/M2 | HEART RATE: 58 BPM

## 2023-07-14 DIAGNOSIS — C85.90 NON-HODGKIN LYMPHOMA, UNSPECIFIED, UNSPECIFIED SITE: ICD-10-CM

## 2023-07-14 DIAGNOSIS — R51.9 HEADACHE, UNSPECIFIED: ICD-10-CM

## 2023-07-14 PROCEDURE — 99215 OFFICE O/P EST HI 40 MIN: CPT

## 2023-07-14 NOTE — ASSESSMENT
[FreeTextEntry1] : Mrs. Rogers is a 78-year-old with remote history of stroke and recently diagnosed non-Hodgkin's lymphoma treated with rituximab.  She has chronic gait and balance difficulties, neuropathic symptoms and more recent problems with memory.  A recent gastric biopsy raise suspicion of a neuroendocrine tumor.  Imaging revealed frontal calvarial abnormalities possibly representing metastases or possibly intraosseous meningioma.  Lung biopsy revealed organizing pneumonia for which she is being treated with prednisone and azithromycin.\par \par It would appear that the most pressing issue is the status of her malignancy.  I would suggest a follow-up MRI of the brain with and without contrast to exclude any progression of the frontal calvarial lesion.  If so, biopsy might be considered.  Further management will depend upon the results and her clinical course.

## 2023-07-14 NOTE — PHYSICAL EXAM
[FreeTextEntry1] : Constitutional:  Patient was well-developed, well-nourished and in no acute distress. \par \par Head:  Normocephalic, atraumatic. Tympanic membranes were obscured by cerumen.  There was prominence of the frontal bone with tenderness on the left.\par \par Neck:  Supple with full range of motion. \par \par Cardiovascular:  Cardiac rhythm was regular without murmur. There were no carotid bruits. Peripheral pulses were full and symmetric. \par \par Respiratory:  Lungs were clear. \par \par Abdomen:  Soft and nontender. \par \par Spine: Nontender\par \par Skin:  There were no rashes. \par \par NEUROLOGICAL EXAMINATION:\par \par Mental Status: Patient was alert and oriented. Speech was fluent. There was no dysarthria.  He scored 26 out of 30 on MMSE making 3 errors in recall and omitting 1 letter spelling world backwards.\par \par Cranial Nerves: \par \par II: Visual acuity was 20/40-1 in the right eye and 20/30-1 in the left eye with without correction.  Pupils were equal and reactive. Visual fields were full. Funduscopic examination was normal. \par \par III, IV, VI:  Eye movements were full without nystagmus. \par \par V: Facial sensation was intact. \par \par VII: Facial strength was normal. \par \par VIII: Hearing was diminished bilaterally. \par \par IX, X: Palatal movement was normal. Phonation was normal. \par \par XI: Sternocleidomastoids and trapezii were normal. \par \par XII: Tongue was midline and movements normal. There was no lingual atrophy or fasciculations. \par \par Motor Examination: Muscle bulk, tone and strength were normal. There was no tremor or rigidity.  There were decreased fine finger and rapid alternating movements in the left more than right hands.\par \par Sensory Examination: There was shading pinprick in a stocking and glove distribution.  Vibration was normal.  Joint position sense was intact.\par \par Reflexes: DTRs were 2 at the triceps and knees, 1 at the biceps and ankles and absent at the brachioradialis\par \par Plantar Responses: Plantar responses were flexor. \par \par Coordination/Cerebellar Function: There was no dysmetria on finger to nose or heel to shin testing. \par \par Gait/Stance: Posture was mildly stooped.  Strides were short and stiff.  Turns were decomposed.  She swayed on Romberg testing.  Tandem was poor.\par

## 2023-07-14 NOTE — HISTORY OF PRESENT ILLNESS
[FreeTextEntry1] : Mrs. MARIBEL GIBBS returns to the office having been initially evaluated on Edith 10, 2021.  She is a 78 year right-handed patient who was previously under the care of Dr. Brannon.  25 years ago, she experienced transient numbness of her right face.  MRI of the brain revealed a stroke.  Carotid Dopplers revealed plaque and she was treated with Plavix.  She was intolerant to statins.\par \par She had a history of cutaneous lymphoma.\par \par She had severe lower extremity venous insufficiency.  She complained of aching of her muscles in her arms and legs.  She experienced calf and toe cramping at night particularly after exertion.  She had been unable to spread her toes for about a year.  She complained of skin sensitivity in her feet and occasional tingling of her toes.  She had a history of intermittent low back pain.  She had a diseased right hip and imbalance.  She suffered from positional vertigo.  She denied headaches, cognitive, visual, hearing, swallowing or bladder dysfunction.\par \par I suspected that she suffered from a polyneuropathy.  She also appears to have peripheral vestibular dysfunction.  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  The absent or prolonged tibial late responses raised suspicion of bilateral S1 radiculopathy.  A comprehensive serologic evaluation was unremarkable except for hemoglobin A1c of 5.7.  Rheumatoid factor was mildly elevated and hemoglobin was 11.5.  MRI of the lumbar spine revealed mild bilateral multilevel foraminal stenosis.  MRI of the foot revealed no muscle abnormality.  He was lost to follow-up.\par \par She was subsequently diagnosed with non-Hodgkin's lymphoma.  She is under the care of Dr. Martinez and is receiving rituximab.  I was called in May 2023.  Dr. Saturnino Go performed a gastric biopsy which raise concern about a neuroendocrine tumor.  Dotatate PET/CT revealed bilateral pulmonary opacifications with low-level activity, mildly avid bilateral hilar foci and avid left inguinal nodes possibly inflammatory.  There were avid osseous calvarial lesions.  MRI of the brain revealed ill-defined lesions in the bilateral frontal bones corresponding to the uptake on the prior PET scan.  Although possibly due to metastases, intraosseous meningioma could not be excluded.  CT of the brain revealed focal areas of sclerosis within the right frontal bone corresponding to the prior area of enhancement on the MRI dated May 9, 2023.  There was no evidence of osseous erosion.\par \par She subsequently underwent a VATS and lung biopsy which revealed focal organizing pneumonia and multiple meningothelial-like nodules.  She is being treated with azithromycin.  She is currently on prednisone.\par \par She complains of painful left frontal swelling for 3 to 4 months.  She complains of repetitiveness for 1 year.  She has longstanding tinnitus and hearing loss.  She is experience choking episodes for few months but MBS was normal.  She has chronic imbalance and numbness of her toes.  She denies bladder dysfunction.  She complains of lightheadedness without vertigo.\par \par Past surgical history is notable for  section and VATS.  She suffers from non-Hodgkin's lymphoma.  There is no history of hypertension, diabetes, hyperlipidemia, cardiac, renal, hepatic, gastrointestinal or thyroid disease.  She suffered a cerebrovascular event 28 years ago.  She has an allergy to aspirin.  Medications include benzonatate 3 times a day, clopidogrel, diazepam as needed, pantoprazole, vitamin D, co-Q10, cranberry, biotin, allopurinol, azithromycin, prednisone 20 mg daily, ipratropium, albuterol and Breztri.  She is  and is a former smoker and social drinker.  Family history is notable for gastrointestinal disease.\par \par Medications include lansoprazole, clopidogrel, benzonatate, diazepam 5 mg daily, vitamin D3 and Symbicort.

## 2023-07-14 NOTE — REVIEW OF SYSTEMS
[Memory Lapses or Loss] : memory loss [Leg Weakness] : leg weakness [Numbness] : numbness [Tingling] : tingling [Abnormal Sensation] : an abnormal sensation [Difficulty Walking] : difficulty walking [Negative] : Heme/Lymph

## 2023-07-19 ENCOUNTER — APPOINTMENT (OUTPATIENT)
Dept: THORACIC SURGERY | Facility: CLINIC | Age: 78
End: 2023-07-19
Payer: MEDICARE

## 2023-07-19 ENCOUNTER — NON-APPOINTMENT (OUTPATIENT)
Age: 78
End: 2023-07-19

## 2023-07-19 VITALS
DIASTOLIC BLOOD PRESSURE: 69 MMHG | HEART RATE: 76 BPM | BODY MASS INDEX: 24.75 KG/M2 | WEIGHT: 145 LBS | SYSTOLIC BLOOD PRESSURE: 120 MMHG | OXYGEN SATURATION: 96 % | HEIGHT: 64 IN

## 2023-07-19 PROCEDURE — 99024 POSTOP FOLLOW-UP VISIT: CPT

## 2023-07-19 NOTE — DISCUSSION/SUMMARY
[Doing Well] : is doing well [Excellent Pain Control] : has excellent pain control [No Sign of Infection] : is showing no signs of infection [4] : 4 [Remove Sutures/Staples] : removed sutures/staples [Clinical Recheck] : clinical recheck

## 2023-07-20 NOTE — ASSESSMENT
[FreeTextEntry1] : Ms. MARIBEL GIBBS, 78 year old female, former smoker (1/4 PPD x 55 years; Quit 2016), w/ hx of anxiety, PVD, non-Hodgkin's lymphoma (on Rituxin since April 2022 managed by Dr. Martinez Onc MD of [facility]), hx of TIA's (on Plavix), moderate COPD, abnormal Chest CT w/ pulmonary nodules, TIA (40 years ago) on Plavix, & Covid-19 infection (10/2022 s/p MAB @ [facility]). Established care with Dr. Frausto in March, 2023. Lung images consistent with inflammatory disease. s/p GI and ENT evaluation. laryngoscopy WNL; Evaluated by SLP: laryngoscopy is WNL. Referred for evaluation regarding biopsy for definitive diagnosis of /BOOP. \par \par PET/CT Dotatate on 5/5/23: \par - Bilateral pulmonary opacities with low level activity, for example: \par * RUL, 2.8 x 1.8 cm; SUV 1.6 (Image 44)\par *LLL, 0.9 x 0.7 cm; SUV 5.7 (Image 64)\par - Mildly Dotatate avid bilateral hilar foci:\par *Right, SUV 2 (Image 114)\par * Left, SV 2.1 (Image 120)\par - Left inginal node, 1.6 x 0.7; SUV 2, Image 251\par - Dotatate avid osseous cavarial lesions\par *Right Frontal; SUVmax 2.3 (Image 32)\par *Left Frontal; SUVmax 1.9 (Image 24)\par \par CT Chest on 5/12/23: \par - Resolved previously new left upper lobe opacity and new peripheral groundglass and consolidation in both lungs. The migratory pattern over multiple prior studies raises the possibility of organizing or eosinophilic pneumonia\par \par s/p Immunological blood work on 5/15/23 @ [facility]. \par - Chlamydia Pneumoniae 1:64 (H)\par - Bordetella pertussis igG Antibody: 2.55 (H)\par - Mycoplasma Pneumoniae \par *Mycoplasma IgG Ab EIA: 3.31 (H)\par *Mycoplasma IgG Interp: Positive \par \par CT Chest on 6/12/23:\par - secretions within right lower lobe segmental airways\par - new patchy mixed groundglass and consolidative opacity in the anterior left upper lobe (image 74, series 2) and tree-in-bud nodules (images 61-68, series 2) (Of note, radiology report states "right", radiology was emailed to correct/amend report).\par - interval resolution of multifocal opacities in the B/L upper lobes and right lower lobe. \par \par Now s/p FB, Left VATS, robotic assisted, TERA wedge resection on 06/27/2023. Path of TERA wedge reveals focal organizing pneumonia and multiple meningothelial-like nodules.\par \par Post operatively, has followed up with Dr. Frausto. Place on Azithromycin as well as Prednisone 20mg. \par \par She presents today for post op follow up. Today, patient endorses pain to surgical site that radiates to anterior chest/under breast. Taking Tylenol with partial relief. Has been increasing activity as tolerated. No worsening shortness, hemoptysis, fever or chills. \par \par I have independently reviewed the medical records and imaging at the time of this office consultation. Path reviewed. Non malignant findings. Patient w/ some neuropathic post op pain. Discussed trial of Gabapentin for symptom control. However, she would like to hold off at the moment. She will return to clinic in 2-3 weeks to re-evaluate pain. She is agreeable. Follow up with Dr. Frausto as per his recommendations. \par \par Recommendations reviewed with patient during this office visit, and all questions answered; Patient instructed on the importance of follow up and verbalizes understanding. \par \par I, STERLING Rao, personally performed the evaluation and management (E/M) services for this established patient. That E/M includes conducting the examination, assessing all new/exacerbated conditions, and establishing a new plan of care. Today, My ACP, Batsheva Gale, was here to observe my evaluation and management services for this patient to be followed going forward.\par \par

## 2023-07-20 NOTE — REASON FOR VISIT
[de-identified] : FB, Left VATS, robotic assisted, TERA wedge resection [de-identified] : 06/27/2023

## 2023-07-21 ENCOUNTER — TRANSCRIPTION ENCOUNTER (OUTPATIENT)
Age: 78
End: 2023-07-21

## 2023-07-21 ENCOUNTER — NON-APPOINTMENT (OUTPATIENT)
Age: 78
End: 2023-07-21

## 2023-07-26 LAB
CULTURE RESULTS: SIGNIFICANT CHANGE UP
SPECIMEN SOURCE: SIGNIFICANT CHANGE UP

## 2023-08-04 ENCOUNTER — TRANSCRIPTION ENCOUNTER (OUTPATIENT)
Age: 78
End: 2023-08-04

## 2023-08-07 ENCOUNTER — RX RENEWAL (OUTPATIENT)
Age: 78
End: 2023-08-07

## 2023-08-07 ENCOUNTER — TRANSCRIPTION ENCOUNTER (OUTPATIENT)
Age: 78
End: 2023-08-07

## 2023-08-09 ENCOUNTER — NON-APPOINTMENT (OUTPATIENT)
Age: 78
End: 2023-08-09

## 2023-08-09 ENCOUNTER — APPOINTMENT (OUTPATIENT)
Dept: THORACIC SURGERY | Facility: CLINIC | Age: 78
End: 2023-08-09
Payer: MEDICARE

## 2023-08-09 VITALS
RESPIRATION RATE: 16 BRPM | TEMPERATURE: 97.8 F | SYSTOLIC BLOOD PRESSURE: 119 MMHG | WEIGHT: 147 LBS | HEIGHT: 64 IN | OXYGEN SATURATION: 95 % | HEART RATE: 68 BPM | BODY MASS INDEX: 25.1 KG/M2 | DIASTOLIC BLOOD PRESSURE: 75 MMHG

## 2023-08-09 DIAGNOSIS — G89.18 OTHER ACUTE POSTPROCEDURAL PAIN: ICD-10-CM

## 2023-08-09 PROCEDURE — 99024 POSTOP FOLLOW-UP VISIT: CPT

## 2023-08-09 RX ORDER — AZITHROMYCIN 250 MG/1
250 TABLET, FILM COATED ORAL
Qty: 36 | Refills: 3 | Status: ACTIVE | COMMUNITY
Start: 2023-07-11 | End: 1900-01-01

## 2023-08-09 NOTE — REASON FOR VISIT
[de-identified] : FB, Left VATS, robotic assisted, TERA wedge resection [de-identified] : 06/27/2023

## 2023-08-09 NOTE — ASSESSMENT
[FreeTextEntry1] : Ms. MARIBEL GIBBS, 78 year old female, former smoker (1/4 PPD x 55 years; Quit 2016), w/ hx of anxiety, PVD, non-Hodgkin's lymphoma (on Rituxin since April 2022 managed by Dr. Martinez Onc MD of [facility]), hx of TIA's (on Plavix), moderate COPD, abnormal Chest CT w/ pulmonary nodules, TIA (40 years ago) on Plavix, & Covid-19 infection (10/2022 s/p MAB @ [facility]). Established care with Dr. Frausto in March, 2023. Lung images consistent with inflammatory disease. s/p GI and ENT evaluation. laryngoscopy WNL; Evaluated by SLP: laryngoscopy is WNL. Referred for evaluation regarding biopsy for definitive diagnosis of /BOOP.   PET/CT Dotatate on 5/5/23:  - Bilateral pulmonary opacities with low level activity, for example:  * RUL, 2.8 x 1.8 cm; SUV 1.6 (Image 44) *LLL, 0.9 x 0.7 cm; SUV 5.7 (Image 64) - Mildly Dotatate avid bilateral hilar foci: *Right, SUV 2 (Image 114) * Left, SV 2.1 (Image 120) - Left inginal node, 1.6 x 0.7; SUV 2, Image 251 - Dotatate avid osseous cavarial lesions *Right Frontal; SUVmax 2.3 (Image 32) *Left Frontal; SUVmax 1.9 (Image 24)  CT Chest on 5/12/23:  - Resolved previously new left upper lobe opacity and new peripheral groundglass and consolidation in both lungs. The migratory pattern over multiple prior studies raises the possibility of organizing or eosinophilic pneumonia  s/p Immunological blood work on 5/15/23 @ [facility].  - Chlamydia Pneumoniae 1:64 (H) - Bordetella pertussis igG Antibody: 2.55 (H) - Mycoplasma Pneumoniae  *Mycoplasma IgG Ab EIA: 3.31 (H) *Mycoplasma IgG Interp: Positive   CT Chest on 6/12/23: - secretions within right lower lobe segmental airways - new patchy mixed groundglass and consolidative opacity in the anterior left upper lobe (image 74, series 2) and tree-in-bud nodules (images 61-68, series 2) (Of note, radiology report states "right", radiology was emailed to correct/amend report). - interval resolution of multifocal opacities in the B/L upper lobes and right lower lobe.   Now s/p FB, Left VATS, robotic assisted, TERA wedge resection on 06/27/2023. Path of TERA wedge reveals focal organizing pneumonia and multiple meningothelial-like nodules.  Post operatively, has followed up with Dr. Frausto. Place on Azithromycin as well as Prednisone 20mg.   Seen on 07/19/2023: Patient w/ some neuropathic post op pain. Discussed trial of Gabapentin for symptom control. However, she would like to hold off at the moment. She will return to clinic in 2-3 weeks to re-evaluate pain.  She presents today for follow up. She reports neuropathic post-op pain, mainly at bedtime and is unable to sleep well. Otherwise, denies any chest pain, shortness of breath, fever, or chills. She is still on the antibiotic and prednisone under the care of Dr. Frausto.   Surgical site healing well, no sign of infection noted.  Patient continues to experience neuropathic pain, 9/10 when present. Discussed trail of gabapentin for symptom control, she is agreeable. She wants to try Gabapentin only at bedtime to start, will give Gabapentin 300 mg at bedtime. I recommend she returns to clinic in 2-3 weeks for clinical f/u. Continue follow up with pulm.  Recommendations reviewed with patient during this office visit, and all questions answered; Patient instructed on the importance of follow up and verbalizes understanding.    I, STERLING Rao, personally performed the evaluation and management (E/M) services for this established patient. That E/M includes conducting the examination, assessing all new/exacerbated conditions, and establishing a new plan of care. Today, my ACP, Slim Garcia NP, was here to observe my evaluation and management services for this new problem/exacerbated condition to be followed going forward.

## 2023-08-09 NOTE — DISCUSSION/SUMMARY
[Doing Well] : is doing well [Fair Pain Control] : has fair pain control [No Sign of Infection] : is showing no signs of infection [de-identified] : neuropathic pain

## 2023-08-12 LAB
CULTURE RESULTS: SIGNIFICANT CHANGE UP
SPECIMEN SOURCE: SIGNIFICANT CHANGE UP

## 2023-08-17 NOTE — REASON FOR VISIT
[de-identified] : FB, Left VATS, robotic assisted, TERA wedge resection [de-identified] : 06/27/2023

## 2023-08-17 NOTE — ASSESSMENT
[FreeTextEntry1] : Ms. MARIBEL GIBBS, 78 year old female, former smoker (1/4 PPD x 55 years; Quit 2016), w/ hx of anxiety, PVD, non-Hodgkin's lymphoma (on Rituxin since April 2022 managed by Dr. Martinez Onc MD of [facility]), hx of TIA's (on Plavix), moderate COPD, abnormal Chest CT w/ pulmonary nodules, TIA (40 years ago) on Plavix, & Covid-19 infection (10/2022 s/p MAB @ [facility]). Established care with Dr. Frausto in March, 2023. Lung images consistent with inflammatory disease. s/p GI and ENT evaluation. laryngoscopy WNL; Evaluated by SLP: laryngoscopy is WNL. Referred for evaluation regarding biopsy for definitive diagnosis of /BOOP.   PET/CT Dotatate on 5/5/23:  - Bilateral pulmonary opacities with low level activity, for example:  * RUL, 2.8 x 1.8 cm; SUV 1.6 (Image 44) *LLL, 0.9 x 0.7 cm; SUV 5.7 (Image 64) - Mildly Dotatate avid bilateral hilar foci: *Right, SUV 2 (Image 114) * Left, SV 2.1 (Image 120) - Left inginal node, 1.6 x 0.7; SUV 2, Image 251 - Dotatate avid osseous cavarial lesions *Right Frontal; SUVmax 2.3 (Image 32) *Left Frontal; SUVmax 1.9 (Image 24)  CT Chest on 5/12/23:  - Resolved previously new left upper lobe opacity and new peripheral groundglass and consolidation in both lungs. The migratory pattern over multiple prior studies raises the possibility of organizing or eosinophilic pneumonia  s/p Immunological blood work on 5/15/23 @ [facility].  - Chlamydia Pneumoniae 1:64 (H) - Bordetella pertussis igG Antibody: 2.55 (H) - Mycoplasma Pneumoniae  *Mycoplasma IgG Ab EIA: 3.31 (H) *Mycoplasma IgG Interp: Positive   CT Chest on 6/12/23: - secretions within right lower lobe segmental airways - new patchy mixed groundglass and consolidative opacity in the anterior left upper lobe (image 74, series 2) and tree-in-bud nodules (images 61-68, series 2) (Of note, radiology report states "right", radiology was emailed to correct/amend report). - interval resolution of multifocal opacities in the B/L upper lobes and right lower lobe.   Now s/p FB, Left VATS, robotic assisted, TERA wedge resection on 06/27/2023. Path of TERA wedge reveals focal organizing pneumonia and multiple meningothelial-like nodules.  Post operatively, has followed up with Dr. Frausto. Place on Azithromycin as well as Prednisone 20mg.   Seen on 07/19/2023: Patient w/ some neuropathic post op pain. Discussed trial of Gabapentin for symptom control. However, she would like to hold off at the moment. She will return to clinic in 2-3 weeks to re-evaluate pain.  Seen on 08/09/2023: She reported neuropathic post-op pain, mainly at bedtime and is unable to sleep well. Trial of Gabapentin 300 mg at bedtime given. I recommended she returns to clinic in 2-3 weeks for clinical f/u.   She presents today for follow up.  I have independently reviewed the medical records and imaging at the time of this office consultation.  Recommendations reviewed with patient during this office visit, and all questions answered; Patient instructed on the importance of follow up and verbalizes understanding.

## 2023-08-23 ENCOUNTER — NON-APPOINTMENT (OUTPATIENT)
Age: 78
End: 2023-08-23

## 2023-08-31 ENCOUNTER — TRANSCRIPTION ENCOUNTER (OUTPATIENT)
Age: 78
End: 2023-08-31

## 2023-09-01 ENCOUNTER — APPOINTMENT (OUTPATIENT)
Dept: PULMONOLOGY | Facility: CLINIC | Age: 78
End: 2023-09-01
Payer: MEDICARE

## 2023-09-01 ENCOUNTER — APPOINTMENT (OUTPATIENT)
Dept: PULMONOLOGY | Facility: CLINIC | Age: 78
End: 2023-09-01

## 2023-09-01 VITALS
RESPIRATION RATE: 16 BRPM | HEART RATE: 82 BPM | BODY MASS INDEX: 24.75 KG/M2 | OXYGEN SATURATION: 96 % | DIASTOLIC BLOOD PRESSURE: 60 MMHG | HEIGHT: 64 IN | TEMPERATURE: 97.6 F | WEIGHT: 145 LBS | SYSTOLIC BLOOD PRESSURE: 128 MMHG

## 2023-09-01 PROCEDURE — 99214 OFFICE O/P EST MOD 30 MIN: CPT

## 2023-09-01 RX ORDER — AMOXICILLIN AND CLAVULANATE POTASSIUM 875; 125 MG/1; MG/1
875-125 TABLET, COATED ORAL
Qty: 20 | Refills: 0 | Status: ACTIVE | COMMUNITY
Start: 2023-09-01 | End: 1900-01-01

## 2023-09-01 NOTE — HISTORY OF PRESENT ILLNESS
[Former] : former [< 20 pack-years] : < 20 pack-years [Never] : never [TextBox_4] : Ms. GIBBS  is a 78 year old, former smoking (quit 2016), female. She has past medical history of non-Hodgkin's lymphoma (on Rituxin since April 2022 managed by Dr. Martinez Onc MD of [facility]), moderate COPD, abnormal Chest CT w/ pulmonary nodules, TIA (40 years ago) on Plavix, & Covid-19 infection (10/2022 s/p MAB @ [facility]). She presents for an initial pulmonary evaluation. She has been followed for many years by Dr. Gomes. She presents with her , Bhargav.   - she notes 2 days ago when she was eating the food got stuck in her abdomen and she had emesis - she notes the next morning she started having a productive cough - she notes taking abx and Prdnisone  (finshing 20 mg 9/5 then moving to 15 mg) - she notes her oncologist wants her to restart chemo - she notes sweats and bloating c/w taking Prednisone - he notes taking Pantoprazole 20 mg before breakfast and dinner - she notes taking GasX for her bloating - she notes dizziness - she notes feeling fatigued due to not sleeping well - she notes using her nebulizer 3x a day - she notes using her inhaler and nasal spray   -she denies any headaches, nausea, emesis, fever, chills, sweats, chest pain, chest pressure, coughing, wheezing, palpitations, constipation, diarrhea, vertigo, dysphagia, heartburn, reflux, itchy eyes, itchy ears, leg swelling, leg pain, arthralgias, myalgias, or sour taste in the mouth. [TextBox_11] : .25 [TextBox_13] : 55 [YearQuit] : 2016

## 2023-09-01 NOTE — ASSESSMENT
[FreeTextEntry1] : Ms. GIBBS  is a 78 year old, former smoker (quit 2016), female. She has past medical history of non-Hodgkin's lymphoma (on Rituxin since April 2022 managed by Dr. Juan Payan MD of [facility]), moderate COPD, abnormal Chest CT w/ pulmonary nodules, TIA (40 years ago) on Plavix, & Covid-19 infection (10/2022 s/p MAB @ [facility]),s/p COVID-19 10/2022 complicated by abnormal CT, COPD/PNA. She presents for a f/u pulmonary evaluation for SOB, GERD, allergies/PND, NO BRANDAN, and abnormal CT c/w inflammatory dz at present - (+) BOOP /  - on Rx  The patient's SOB is felt to be multifactorial: -poor mechanics of breathing -out of shape/overweight -Pulmonary   -COPD   -Emphysema -Cardiac (Gamaliel)  Problem 1: COPD/Emphysema -continue Breztri 2 puffs BID  -add DuoNeb via nebulizer, Q6H (before Breztri) -COPD is a progressive disease and although it cant be cured, appropriate management can slow its progression, reduce frequency and severity of exacerbations, and improve symptoms and the patient quality of life. Hospitalizations are the greatest contributor to the total COPD costs and account for up to 87% of total COPD related costs. Exacerbations are the main cause of admissions and subsequently account for the 40%-75% of COPD costs. Inhaled maintenance therapy reduces the incidence of exacerbations in patients with stable CPPD. Incorrect inhaler use and nonadherence are major obstacles to achieving COPD treatments goals. Many COPD patients have challenges (impaired inhalation, limited dexterity, reduced cognition: that limit their ability to correctly use their COPD treatment devices resulting in reduced symptom control. Of most importance is smoking cessation and early intervention with respiratory illnesses and contemplation for pulmonary rehab to enhance quality of life.   Problem 1A: Acute Bronchitis -add Augmentin 875 mg BID You have a clinical scenario most c/q acute bronchitis the etiology of which is unknown but empiric antibiotics are indicated. Hydration, mucolytics including Mucinex, Robitussin and the like are indicated. Cough controlling agents will be needed.  Problem 2: Allergies/PND -add Dymista 1 sniff BID  -Environmental measures for allergies were encouraged including mattress and pillow cover, air purifier, and environmental controls.   Problem 3: GERD (Saturnino Go)  -continue Prevacid 30 mg QAM before breakfast -add Pepcid 40 mg QHS  -Rule of 2s: avoid eating too much, eating too late, eating too spicy, eating two hours before bed. - Things to avoid including overeating, spicy foods, tight clothing, eating within two hours of bed, this list is not all inclusive. - For treatments of reflux, possible options discussed including diet control, H2 blockers, PPIs, as well as coating motility agents discussed as treatment options. Timing of meals and proximity of last meal to sleep were discussed. If symptoms persist, a formal gastrointestinal evaluation is needed.   Problem 4: NO BRANDAN (risk factors: poor sleep, elevated Mallampati class) -s/p home sleep study 8/2023 - negative  -Sleep apnea is associated with adverse clinical consequences which can affect most organ systems. Cardiovascular disease risk includes arrhythmias, atrial fibrillation, hypertension, coronary artery disease, and stroke. Metabolic disorders include diabetes type 2, non-alcoholic fatty liver disease. Mood disorder especially depression; and cognitive decline especially in the elderly. Associations with chronic reflux/Aguilar's esophagus some but not all inclusive.  -Reasons include arousal consistent with hypopnea; respiratory events most prominent in REM sleep or supine position; therefore sleep staging and body position are important for accurate diagnosis and estimation of AHI.   Problem 5: Abnormal CT c/w inflammatory disease - improved, overall decreased densities in TERA and left side, new (?aspiration)-though pt at risk for lung CA based on prior smoking Hx - s/p VATS bx (Alverto) - c/w  / BOOP -continue GI evaluation (Dr. Saturnino Go) -s/p FEESST study WNL -s/p fluoroscopy of the diaphragm WNL -complete f/u CT 4-6 months - 10/2023  - s/p Bx - continue Prednisone at 15 mg QD (from 20 mg) - Taper in progress -continue Zithromax 500 mg M/W/F -CAT scans are the only radiological modality to identify abnormalities w/in the lings with regards to nodules/masses/lymph nodes. Risks, benefits were reviewed in detail. The guidelines for abnormalities include follow up CT scans at various intervals which could range from 6 weeks to 1 year intervals. If there is a change for the worse then considerations for a biopsy will be considered if you are a candidate. Second opinion evaluation with thoracic surgeon or an interventional radiologist could be offered,			   Problem 6: Cardiac (Gamaliel) -Recommend cardiac follow up evaluation with cardiologist if needed   Problem 7: overweight/out of shape -Recommended Jimmy York's 10-day detox diet and book. -Recommend "Muniq" OTC for weight loss, energy, and blood sugar - Weight loss, exercise and diet control were discussed and are highly encouraged. Treatment options were given such as aqua therapy, and contacting a nutritionist. Recommended to use the elliptical, stationary bike, less use of treadmill. Mindful eating was explained to the patient. Obesity is associated with worsening asthma, SOB, and potential for cardiac disease, diabetes, and other underlying medical conditions.  Problem 8: Poor mechanics of breathing -Recommended Melodie Meraz breathing techniques -Recommended [email or web address] and to YouTube "aerobic exercises for seniors" -Proper breathing techniques were reviewed with an emphasis on exhalation. Patient instructed to breath in for 1 second and out for four seconds. Patient was encouraged not to talk while walking.  Problem 9: Health Maintenance -recommended RSV vaccine in the Fall for anyone over the age of 60 -recommended Sanotize anti viral nasal spray in case of viral infection -s/p flu shot -recommended strep pneumonia vaccines: Prevnar-20 vaccine, follow by Pneumo vaccine 23 one year following -recommended early intervention for URIs -recommended regular osteoporosis evaluations -recommended early dermatological evaluations -recommended after the age of 50 to the age of 70, colonoscopy every 5 years  f/u in 6-8 weeks pt is encouraged to call or fax the office with any questions or concerns.

## 2023-09-01 NOTE — PHYSICAL EXAM
[No Acute Distress] : no acute distress [Well Nourished] : well nourished [No Deformities] : no deformities [Normal Oropharynx] : normal oropharynx [III] : Mallampati Class: III [Normal Appearance] : normal appearance [No Neck Mass] : no neck mass [Normal Rate/Rhythm] : normal rate/rhythm [Normal S1, S2] : normal s1, s2 [No Murmurs] : no murmurs [No Resp Distress] : no resp distress [Clear to Auscultation Bilaterally] : clear to auscultation bilaterally [Wheeze] : wheeze [No Abnormalities] : no abnormalities [Benign] : benign [Normal Gait] : normal gait [No Clubbing] : no clubbing [No Cyanosis] : no cyanosis [No Edema] : no edema [FROM] : FROM [Normal Color/ Pigmentation] : normal color/ pigmentation [No Focal Deficits] : no focal deficits [Oriented x3] : oriented x3 [Normal Affect] : normal affect [TextBox_68] : I:E 1:3; mild expiratory wheeze

## 2023-09-01 NOTE — REASON FOR VISIT
[Spouse] : spouse [Follow-Up] : a follow-up visit [TextBox_44] : SOB, GERD, allergies/PND, ?BRANDAN, and abnormal CT

## 2023-09-01 NOTE — ADDENDUM
[FreeTextEntry1] : Documented by Hay Garcia acting as a scribe for Dr. Pepe Frausto on 09/01/2023.   All medical record entries made by the Scribe were at my, Dr. Pepe Frausto's, direction and personally dictated by me on 09/01/2023. I have reviewed the chart and agree that the record accurately reflects my personal performance of the history, physical exam, assessment and plan. I have also personally directed, reviewed, and agree with the discharge instructions.

## 2023-09-01 NOTE — PROCEDURE
[FreeTextEntry1] : Sleep study (7.30.2023) revealed sleep apnea with an AHI/BRUNO of 0, snore index of % and a low oxygen saturation of 74%

## 2023-09-01 NOTE — HISTORY OF PRESENT ILLNESS
[TextBox_4] : Ms. GIBBS is a 89year old, former smoking (quit 2016), female. She has past medical history of non-Hodgkin's lymphoma (on Rituxin since April 2022 managed by Dr. Martinez Onc MD of [facility]), moderate COPD, abnormal Chest CT w/ pulmonary nodules s/p Left VATS with Dr. York on 6/2023, TIA (40 years ago) on Plavix, & Covid-19 infection (10/2022 s/p MAB @ [facility]). She presents for an acute care pulmonary evaluation. She has been followed for many years by Dr. Gomes. She presents with her , Bhargav.  Her chief concern is possible aspiration.  Patient states that she is currently on prednisone 15 mg, which she will continue until 9/5/2023. Patient states she was seen by GI, Dr. Watson, 8/30/23 (2 days ago).  She states he increased her pantoprazole from 40 mg daily to 40 mg twice daily for a total of 80 mg. She states after seeing GI on 8/30/23 she felt a "lump in her stomach" and is concerned that she possibly aspirated food. She notes she started to experience a cough productive of yellow mucus and experiencing shortness of breath.  She notes symptoms had continued

## 2023-09-04 ENCOUNTER — RX RENEWAL (OUTPATIENT)
Age: 78
End: 2023-09-04

## 2023-09-06 ENCOUNTER — OUTPATIENT (OUTPATIENT)
Dept: OUTPATIENT SERVICES | Facility: HOSPITAL | Age: 78
LOS: 1 days | End: 2023-09-06
Payer: MEDICARE

## 2023-09-06 ENCOUNTER — APPOINTMENT (OUTPATIENT)
Dept: CT IMAGING | Facility: CLINIC | Age: 78
End: 2023-09-06
Payer: MEDICARE

## 2023-09-06 ENCOUNTER — TRANSCRIPTION ENCOUNTER (OUTPATIENT)
Age: 78
End: 2023-09-06

## 2023-09-06 DIAGNOSIS — Z98.891 HISTORY OF UTERINE SCAR FROM PREVIOUS SURGERY: Chronic | ICD-10-CM

## 2023-09-06 DIAGNOSIS — Z98.890 OTHER SPECIFIED POSTPROCEDURAL STATES: Chronic | ICD-10-CM

## 2023-09-06 DIAGNOSIS — R05.9 COUGH, UNSPECIFIED: ICD-10-CM

## 2023-09-06 PROCEDURE — 71250 CT THORAX DX C-: CPT | Mod: 26,MH

## 2023-09-06 PROCEDURE — 71250 CT THORAX DX C-: CPT

## 2023-09-08 ENCOUNTER — TRANSCRIPTION ENCOUNTER (OUTPATIENT)
Age: 78
End: 2023-09-08

## 2023-09-11 LAB — BACTERIA SPT CULT: NORMAL

## 2023-09-13 ENCOUNTER — APPOINTMENT (OUTPATIENT)
Dept: THORACIC SURGERY | Facility: CLINIC | Age: 78
End: 2023-09-13
Payer: MEDICARE

## 2023-09-13 VITALS
TEMPERATURE: 97.9 F | OXYGEN SATURATION: 95 % | DIASTOLIC BLOOD PRESSURE: 65 MMHG | BODY MASS INDEX: 25.27 KG/M2 | WEIGHT: 148 LBS | HEIGHT: 64 IN | SYSTOLIC BLOOD PRESSURE: 111 MMHG | HEART RATE: 76 BPM | RESPIRATION RATE: 17 BRPM

## 2023-09-13 DIAGNOSIS — R91.8 OTHER NONSPECIFIC ABNORMAL FINDING OF LUNG FIELD: ICD-10-CM

## 2023-09-13 PROCEDURE — 99024 POSTOP FOLLOW-UP VISIT: CPT

## 2023-09-13 RX ORDER — GABAPENTIN 300 MG/1
300 CAPSULE ORAL
Qty: 30 | Refills: 0 | Status: COMPLETED | COMMUNITY
Start: 2023-08-09 | End: 2023-09-13

## 2023-09-14 ENCOUNTER — APPOINTMENT (OUTPATIENT)
Dept: PULMONOLOGY | Facility: CLINIC | Age: 78
End: 2023-09-14
Payer: MEDICARE

## 2023-09-14 VITALS
TEMPERATURE: 97.1 F | BODY MASS INDEX: 25.27 KG/M2 | SYSTOLIC BLOOD PRESSURE: 114 MMHG | HEART RATE: 73 BPM | HEIGHT: 64 IN | RESPIRATION RATE: 16 BRPM | DIASTOLIC BLOOD PRESSURE: 72 MMHG | WEIGHT: 148 LBS | OXYGEN SATURATION: 97 %

## 2023-09-14 DIAGNOSIS — R05.9 COUGH, UNSPECIFIED: ICD-10-CM

## 2023-09-14 PROCEDURE — 94729 DIFFUSING CAPACITY: CPT

## 2023-09-14 PROCEDURE — 94727 GAS DIL/WSHOT DETER LNG VOL: CPT

## 2023-09-14 PROCEDURE — 95012 NITRIC OXIDE EXP GAS DETER: CPT

## 2023-09-14 PROCEDURE — 94010 BREATHING CAPACITY TEST: CPT

## 2023-09-14 PROCEDURE — 99214 OFFICE O/P EST MOD 30 MIN: CPT | Mod: 25

## 2023-09-25 ENCOUNTER — APPOINTMENT (OUTPATIENT)
Dept: PULMONOLOGY | Facility: CLINIC | Age: 78
End: 2023-09-25

## 2023-10-03 ENCOUNTER — TRANSCRIPTION ENCOUNTER (OUTPATIENT)
Age: 78
End: 2023-10-03

## 2023-10-03 RX ORDER — BUDESONIDE, GLYCOPYRROLATE, AND FORMOTEROL FUMARATE 160; 9; 4.8 UG/1; UG/1; UG/1
160-9-4.8 AEROSOL, METERED RESPIRATORY (INHALATION)
Qty: 1 | Refills: 2 | Status: ACTIVE | COMMUNITY
Start: 2023-02-03 | End: 1900-01-01

## 2023-11-03 ENCOUNTER — TRANSCRIPTION ENCOUNTER (OUTPATIENT)
Age: 78
End: 2023-11-03

## 2023-11-21 ENCOUNTER — RX RENEWAL (OUTPATIENT)
Age: 78
End: 2023-11-21

## 2023-11-21 RX ORDER — AZELASTINE HYDROCHLORIDE AND FLUTICASONE PROPIONATE 137; 50 UG/1; UG/1
137-50 SPRAY, METERED NASAL
Qty: 3 | Refills: 2 | Status: ACTIVE | COMMUNITY
Start: 2023-03-14 | End: 1900-01-01

## 2023-11-22 ENCOUNTER — RX RENEWAL (OUTPATIENT)
Age: 78
End: 2023-11-22

## 2023-11-22 RX ORDER — PREDNISONE 10 MG/1
10 TABLET ORAL
Qty: 100 | Refills: 1 | Status: ACTIVE | COMMUNITY
Start: 2023-07-11 | End: 1900-01-01

## 2023-11-27 NOTE — ASU PATIENT PROFILE, ADULT - TOBACCO USE
Quality 226: Preventive Care And Screening: Tobacco Use: Screening And Cessation Intervention: Patient screened for tobacco use and is an ex/non-smoker Quality 111:Pneumonia Vaccination Status For Older Adults: Patient received any pneumococcal conjugate or polysaccharide vaccine on or after their 60th birthday and before the end of the measurement period Quality 130: Documentation Of Current Medications In The Medical Record: Current Medications Documented Detail Level: Detailed Quality 110: Preventive Care And Screening: Influenza Immunization: Influenza Immunization previously received during influenza season Quality 431: Preventive Care And Screening: Unhealthy Alcohol Use - Screening: Patient not identified as an unhealthy alcohol user when screened for unhealthy alcohol use using a systematic screening method Former smoker

## 2023-12-20 RX ORDER — BUDESONIDE 0.5 MG/2ML
0.5 INHALANT ORAL TWICE DAILY
Qty: 2 | Refills: 3 | Status: DISCONTINUED | COMMUNITY
Start: 2023-12-15 | End: 2023-12-20

## 2023-12-23 LAB — BACTERIA SPT CULT: NORMAL

## 2024-01-03 ENCOUNTER — APPOINTMENT (OUTPATIENT)
Dept: PULMONOLOGY | Facility: CLINIC | Age: 79
End: 2024-01-03
Payer: MEDICARE

## 2024-01-03 VITALS
OXYGEN SATURATION: 98 % | TEMPERATURE: 96.4 F | BODY MASS INDEX: 25.27 KG/M2 | WEIGHT: 148 LBS | DIASTOLIC BLOOD PRESSURE: 72 MMHG | HEIGHT: 64 IN | HEART RATE: 72 BPM | RESPIRATION RATE: 16 BRPM | SYSTOLIC BLOOD PRESSURE: 114 MMHG

## 2024-01-03 DIAGNOSIS — J18.9 PNEUMONIA, UNSPECIFIED ORGANISM: ICD-10-CM

## 2024-01-03 DIAGNOSIS — J84.116 CRYPTOGENIC ORGANIZING PNEUMONIA: ICD-10-CM

## 2024-01-03 DIAGNOSIS — Z87.09 PERSONAL HISTORY OF OTHER DISEASES OF THE RESPIRATORY SYSTEM: ICD-10-CM

## 2024-01-03 PROCEDURE — 94010 BREATHING CAPACITY TEST: CPT

## 2024-01-03 PROCEDURE — 95012 NITRIC OXIDE EXP GAS DETER: CPT

## 2024-01-03 PROCEDURE — 99214 OFFICE O/P EST MOD 30 MIN: CPT | Mod: 25

## 2024-01-03 NOTE — HISTORY OF PRESENT ILLNESS
[< 20 pack-years] : < 20 pack-years [Former] : former [Never] : never [TextBox_4] : Ms. GIBBS  is a 78 year old, former smoking (quit 2016), female. She has past medical history of non-Hodgkin's lymphoma (on Rituxin since April 2022 managed by Dr. Juan Payan MD of [facility]), moderate COPD, abnormal Chest CT w/ pulmonary nodules, TIA (40 years ago) on Plavix, & Covid-19 infection (10/2022 s/p MAB @ [facility]). She presents for an follow up pulmonary evaluation. She has been followed for many years by Dr. Gomes. She presents with her , Bhargav.   - she notes feeling frequently fatigued - she notes she is still having some rib pain from her bx  - she notes having had shingles which was painful but is currently doing well - she notes she is coughing every once in a while - she notes she is currently on 10 mg of Prednisone for about 2 weeks now - she notes she is not bringing up too much mucus - she notes getting some left side chest tightness - she notes Budesonide has helped her cough alongside 10 mg of Prednisone - she notes bowels are regular on and off - sense of taste and smell are good - vision is stable - she notes getting SOB if she walks a lot  - she notes her breathing is worse than is was a year ago  - she notes her oxygen levels have been good  - she notes she has always had a reaction to all the treatments she has had which she thinks is the source of her cough  - she notes she is still taking Azithromycin M/W/F  -she denies any headaches, nausea, emesis, fever, chills, sweats, chest pain, chest pressure, wheezing, palpitations, constipation, diarrhea, vertigo, dysphagia, heartburn, reflux, itchy eyes, itchy ears, leg swelling, leg pain, arthralgias, or sour taste in the mouth. [TextBox_11] : .25 [TextBox_13] : 55 [YearQuit] : 2016

## 2024-01-03 NOTE — PROCEDURE
[FreeTextEntry1] : PFT reveals mild restrictive and obstructive dysfunction, with an FEV1 of 1.38 L, which is 67.9% of predicted, with a normal flow volume loop. PFTs were performed to evaluate for SOB  FENO unable to perform

## 2024-01-03 NOTE — ADDENDUM
[FreeTextEntry1] : Documented by Hay Garcia acting as a scribe for Dr. Pepe Frausto on 01/03/2024.   All medical record entries made by the Scribe were at my, Dr. Pepe Frausto's, direction and personally dictated by me on 01/03/2024. I have reviewed the chart and agree that the record accurately reflects my personal performance of the history, physical exam, assessment and plan. I have also personally directed, reviewed, and agree with the discharge instructions.

## 2024-01-03 NOTE — REVIEW OF SYSTEMS
[Fatigue] : fatigue [Postnasal Drip] : postnasal drip [Cough] : cough [SOB on Exertion] : sob on exertion [Negative] : Endocrine [Fever] : no fever [Chills] : no chills [Nasal Congestion] : no nasal congestion [Poor Appetite] : no poor appetite [Sinus Problems] : no sinus problems [Chest Tightness] : no chest tightness [Sputum] : no sputum [Dyspnea] : no dyspnea [Wheezing] : no wheezing

## 2024-01-03 NOTE — PHYSICAL EXAM
[No Acute Distress] : no acute distress [Well Nourished] : well nourished [No Deformities] : no deformities [III] : Mallampati Class: III [Normal Oropharynx] : normal oropharynx [Normal Appearance] : normal appearance [No Neck Mass] : no neck mass [Normal Rate/Rhythm] : normal rate/rhythm [Normal S1, S2] : normal s1, s2 [No Murmurs] : no murmurs [No Resp Distress] : no resp distress [Clear to Auscultation Bilaterally] : clear to auscultation bilaterally [Wheeze] : wheeze [No Abnormalities] : no abnormalities [Benign] : benign [Normal Gait] : normal gait [No Clubbing] : no clubbing [No Cyanosis] : no cyanosis [No Edema] : no edema [FROM] : FROM [Normal Color/ Pigmentation] : normal color/ pigmentation [No Focal Deficits] : no focal deficits [Oriented x3] : oriented x3 [Normal Affect] : normal affect [TextBox_68] : I:E 1:3; mild and forced expiratory wheeze

## 2024-01-03 NOTE — REASON FOR VISIT
[Follow-Up] : a follow-up visit [Spouse] : spouse [TextBox_44] : SOB, GERD, allergies/PND, ?BRANDAN, and abnormal CT

## 2024-01-03 NOTE — ASSESSMENT
[FreeTextEntry1] : Ms. GIBBS is a 78 year old, former smoker (quit 2016), female. She has past medical history of non-Hodgkin's lymphoma (on Rituxin since April 2022 managed by Dr. Juan Payan MD of [facility]), moderate COPD, abnormal Chest CT w/ pulmonary nodules, TIA (40 years ago) on Plavix, & Covid-19 infection (10/2022 s/p MAB @ [facility]),s/p COVID-19 10/2022 complicated by abnormal CT, COPD/PNA. She presents for a f/u pulmonary evaluation for SOB, GERD, allergies/PND, NO BRANDAN, and abnormal CT c/w inflammatory dz at present - (+) BOOP /  - on Rx (?Rituxin related)  The patient's SOB is felt to be multifactorial: -poor mechanics of breathing -out of shape/overweight -Pulmonary  -COPD  -Emphysema -Cardiac (Gamaliel)  Problem 1: COPD/Emphysema -continue Breztri 2 puffs BID -add DuoNeb via nebulizer, Q6H (before Breztri) -add Budesonide 0.5% via nebulizer BID -COPD is a progressive disease and although it cant be cured, appropriate management can slow its progression, reduce frequency and severity of exacerbations, and improve symptoms and the patient quality of life. Hospitalizations are the greatest contributor to the total COPD costs and account for up to 87% of total COPD related costs. Exacerbations are the main cause of admissions and subsequently account for the 40%-75% of COPD costs. Inhaled maintenance therapy reduces the incidence of exacerbations in patients with stable CPPD. Incorrect inhaler use and nonadherence are major obstacles to achieving COPD treatments goals. Many COPD patients have challenges (impaired inhalation, limited dexterity, reduced cognition: that limit their ability to correctly use their COPD treatment devices resulting in reduced symptom control. Of most importance is smoking cessation and early intervention with respiratory illnesses and contemplation for pulmonary rehab to enhance quality of life.  Problem 2: Allergies/PND -add Dymista 1 sniff BID -Environmental measures for allergies were encouraged including mattress and pillow cover, air purifier, and environmental controls.  Problem 3: GERD (Saturnino Go) - H. Pylori -continue Prevacid 30 mg QAM before breakfast -add Pepcid 40 mg QHS -Rule of 2s: avoid eating too much, eating too late, eating too spicy, eating two hours before bed. - Things to avoid including overeating, spicy foods, tight clothing, eating within two hours of bed, this list is not all inclusive. - For treatments of reflux, possible options discussed including diet control, H2 blockers, PPIs, as well as coating motility agents discussed as treatment options. Timing of meals and proximity of last meal to sleep were discussed. If symptoms persist, a formal gastrointestinal evaluation is needed.  Problem 4: NO BRANDAN (risk factors: poor sleep, elevated Mallampati class) -s/p home sleep study 8/2023 - negative -Sleep apnea is associated with adverse clinical consequences which can affect most organ systems. Cardiovascular disease risk includes arrhythmias, atrial fibrillation, hypertension, coronary artery disease, and stroke. Metabolic disorders include diabetes type 2, non-alcoholic fatty liver disease. Mood disorder especially depression; and cognitive decline especially in the elderly. Associations with chronic reflux/Aguilar's esophagus some but not all inclusive. -Reasons include arousal consistent with hypopnea; respiratory events most prominent in REM sleep or supine position; therefore sleep staging and body position are important for accurate diagnosis and estimation of AHI.  Problem 5: Abnormal CT c/w inflammatory disease - improved, overall decreased densities in TERA and left side, new (?aspiration)-though pt at risk for lung CA based on prior smoking Hx - s/p VATS bx (Alverto) - c/w  / BOOP -continue GI evaluation (Dr. Saturnino Go) -s/p FEESST study WNL -s/p fluoroscopy of the diaphragm WNL -complete f/u CT 4-6 months - 3/2024 - s/p Bx - continue Prednisone at 10 mg - drop by 1 mg to 5 mg  -continue Zithromax 500 mg M/W/F -CAT scans are the only radiological modality to identify abnormalities w/in the lings with regards to nodules/masses/lymph nodes. Risks, benefits were reviewed in detail. The guidelines for abnormalities include follow up CT scans at various intervals which could range from 6 weeks to 1 year intervals. If there is a change for the worse then considerations for a biopsy will be considered if you are a candidate. Second opinion evaluation with thoracic surgeon or an interventional radiologist could be offered,    Problem 6: Cardiac (Gamaliel) -Recommend cardiac follow up evaluation with cardiologist if needed  Problem 7: overweight/out of shape -Recommended Jimmy York's 10-day detox diet and book. -Recommend "Muniq" OTC for weight loss, energy, and blood sugar - Weight loss, exercise and diet control were discussed and are highly encouraged. Treatment options were given such as aqua therapy, and contacting a nutritionist. Recommended to use the elliptical, stationary bike, less use of treadmill. Mindful eating was explained to the patient. Obesity is associated with worsening asthma, SOB, and potential for cardiac disease, diabetes, and other underlying medical conditions.  Problem 8: Poor mechanics of breathing -Recommended Wim Hof and Buteyko breathing techniques -Recommended [email or web address] and to YouTube "aerobic exercises for seniors" -Proper breathing techniques were reviewed with an emphasis on exhalation. Patient instructed to breath in for 1 second and out for four seconds. Patient was encouraged not to talk while walking.  Problem 9: Health Maintenance -recommended RSV vaccine in the Fall for anyone over the age of 60 -recommended Sanotize anti viral nasal spray in case of viral infection -s/p flu shot 2023 -recommended strep pneumonia vaccines: Prevnar-20 vaccine, follow by Pneumo vaccine 23 one year following -recommended early intervention for URIs -recommended regular osteoporosis evaluations -recommended early dermatological evaluations -recommended after the age of 50 to the age of 70, colonoscopy every 5 years  f/u in 4-6 months  pt is encouraged to call or fax the office with any questions or concerns.

## 2024-01-11 ENCOUNTER — RX RENEWAL (OUTPATIENT)
Age: 79
End: 2024-01-11

## 2024-01-11 DIAGNOSIS — Z11.59 ENCOUNTER FOR SCREENING FOR OTHER VIRAL DISEASES: ICD-10-CM

## 2024-01-12 DIAGNOSIS — J10.1 INFLUENZA DUE TO OTHER IDENTIFIED INFLUENZA VIRUS WITH OTHER RESPIRATORY MANIFESTATIONS: ICD-10-CM

## 2024-01-12 LAB
FLUAV H3 RNA SPEC QL NAA+PROBE: DETECTED
RAPID RVP RESULT: DETECTED
SARS-COV-2 RNA PNL RESP NAA+PROBE: NOT DETECTED

## 2024-01-12 RX ORDER — OSELTAMIVIR PHOSPHATE 75 MG/1
75 CAPSULE ORAL TWICE DAILY
Qty: 1 | Refills: 0 | Status: ACTIVE | COMMUNITY
Start: 2024-01-12 | End: 1900-01-01

## 2024-01-22 ENCOUNTER — RX RENEWAL (OUTPATIENT)
Age: 79
End: 2024-01-22

## 2024-02-07 LAB — ACID FAST STN SPT: NORMAL

## 2024-02-20 ENCOUNTER — RX RENEWAL (OUTPATIENT)
Age: 79
End: 2024-02-20

## 2024-03-01 ENCOUNTER — OUTPATIENT (OUTPATIENT)
Dept: OUTPATIENT SERVICES | Facility: HOSPITAL | Age: 79
LOS: 1 days | End: 2024-03-01
Payer: MEDICARE

## 2024-03-01 ENCOUNTER — APPOINTMENT (OUTPATIENT)
Dept: CT IMAGING | Facility: CLINIC | Age: 79
End: 2024-03-01
Payer: MEDICARE

## 2024-03-01 DIAGNOSIS — J84.116 CRYPTOGENIC ORGANIZING PNEUMONIA: ICD-10-CM

## 2024-03-01 DIAGNOSIS — Z98.891 HISTORY OF UTERINE SCAR FROM PREVIOUS SURGERY: Chronic | ICD-10-CM

## 2024-03-01 DIAGNOSIS — Z98.890 OTHER SPECIFIED POSTPROCEDURAL STATES: Chronic | ICD-10-CM

## 2024-03-01 DIAGNOSIS — R93.89 ABNORMAL FINDINGS ON DIAGNOSTIC IMAGING OF OTHER SPECIFIED BODY STRUCTURES: ICD-10-CM

## 2024-03-01 PROCEDURE — 71250 CT THORAX DX C-: CPT

## 2024-03-01 PROCEDURE — 71250 CT THORAX DX C-: CPT | Mod: 26,MH

## 2024-03-21 ENCOUNTER — TRANSCRIPTION ENCOUNTER (OUTPATIENT)
Age: 79
End: 2024-03-21

## 2024-03-21 RX ORDER — BUDESONIDE 1 MG/2ML
1 INHALANT ORAL
Qty: 60 | Refills: 2 | Status: ACTIVE | COMMUNITY
Start: 2023-12-20 | End: 1900-01-01

## 2024-03-24 ENCOUNTER — RX RENEWAL (OUTPATIENT)
Age: 79
End: 2024-03-24

## 2024-03-24 RX ORDER — PREDNISONE 1 MG/1
1 TABLET ORAL
Qty: 100 | Refills: 2 | Status: ACTIVE | COMMUNITY
Start: 2024-01-03 | End: 1900-01-01

## 2024-04-15 ENCOUNTER — APPOINTMENT (OUTPATIENT)
Dept: NEUROLOGY | Facility: CLINIC | Age: 79
End: 2024-04-15
Payer: MEDICARE

## 2024-04-16 ENCOUNTER — TRANSCRIPTION ENCOUNTER (OUTPATIENT)
Age: 79
End: 2024-04-16

## 2024-04-16 DIAGNOSIS — D32.0 BENIGN NEOPLASM OF CEREBRAL MENINGES: ICD-10-CM

## 2024-04-17 ENCOUNTER — TRANSCRIPTION ENCOUNTER (OUTPATIENT)
Age: 79
End: 2024-04-17

## 2024-04-18 ENCOUNTER — OUTPATIENT (OUTPATIENT)
Dept: OUTPATIENT SERVICES | Facility: HOSPITAL | Age: 79
LOS: 1 days | End: 2024-04-18
Payer: MEDICARE

## 2024-04-18 ENCOUNTER — APPOINTMENT (OUTPATIENT)
Dept: MRI IMAGING | Facility: CLINIC | Age: 79
End: 2024-04-18
Payer: MEDICARE

## 2024-04-18 ENCOUNTER — APPOINTMENT (OUTPATIENT)
Dept: NEUROLOGY | Facility: CLINIC | Age: 79
End: 2024-04-18
Payer: MEDICARE

## 2024-04-18 VITALS
HEIGHT: 64 IN | DIASTOLIC BLOOD PRESSURE: 70 MMHG | SYSTOLIC BLOOD PRESSURE: 125 MMHG | WEIGHT: 150 LBS | BODY MASS INDEX: 25.61 KG/M2 | HEART RATE: 90 BPM

## 2024-04-18 DIAGNOSIS — R29.2 ABNORMAL REFLEX: ICD-10-CM

## 2024-04-18 DIAGNOSIS — Z98.890 OTHER SPECIFIED POSTPROCEDURAL STATES: Chronic | ICD-10-CM

## 2024-04-18 DIAGNOSIS — Z98.891 HISTORY OF UTERINE SCAR FROM PREVIOUS SURGERY: Chronic | ICD-10-CM

## 2024-04-18 PROCEDURE — 72141 MRI NECK SPINE W/O DYE: CPT

## 2024-04-18 PROCEDURE — 72146 MRI CHEST SPINE W/O DYE: CPT | Mod: 26,MH

## 2024-04-18 PROCEDURE — 99214 OFFICE O/P EST MOD 30 MIN: CPT

## 2024-04-18 PROCEDURE — 72141 MRI NECK SPINE W/O DYE: CPT | Mod: 26,MH

## 2024-04-18 PROCEDURE — 72146 MRI CHEST SPINE W/O DYE: CPT

## 2024-04-18 NOTE — ASSESSMENT
[FreeTextEntry1] : Mrs. Rogers is an 79-year-old with remote history of stroke and non-Hodgkin's lymphoma.  She has a chronic pulmonary process which is being treated with azithromycin and prednisone.  Rituximab was discontinued because of potential side effects.  Recent FDG PET scan was unremarkable.  She has several neurologic issues.  She has persistent left frontal discomfort which I suspect emanates from the frontal calvarium.  A follow-up MRI of the brain with without contrast will be ordered.  If necessary, a follow-up CT will be requested.  She is status post right ophthalmic zoster with resolution.  She complains of left wrist pain following ORIF of a fracture.  She may have carpal tunnel syndrome and osteoarthritis.  X-rays and EMG and nerve conduction studies can be pursued.  Regarding her lower extremity sensory complaints and spasticity, I suggested MRIs of the cervical and thoracic spine.  EMG and nerve conduction studies performed in 2021 were notable for abnormal sural potentials, absent H reflexes and low amplitude tibial motor potentials possibly on the basis of a sensorimotor polyneuropathy.  Further management will depend upon the above results and her clinical course.  I will keep you informed of her status.

## 2024-04-18 NOTE — HISTORY OF PRESENT ILLNESS
[FreeTextEntry1] : Mrs. MARIBEL GIBBS returned to the office having been initially evaluated on 2023.  She is an 79 year right-handed patient who was previously under the care of Dr. Brannon.  25 years ago, she experienced transient numbness of her right face.  MRI of the brain revealed a stroke.  Carotid Dopplers revealed plaque and she was treated with Plavix.  She was intolerant to statins.  She had a history of cutaneous lymphoma.  She had severe lower extremity venous insufficiency.  She complained of aching of her muscles in her arms and legs.  She experienced calf and toe cramping at night particularly after exertion.  She had been unable to spread her toes for about a year.  She complained of skin sensitivity in her feet and occasional tingling of her toes.  She had a history of intermittent low back pain.  She had a diseased right hip and imbalance.  She suffered from positional vertigo.  She denied headaches, cognitive, visual, hearing, swallowing or bladder dysfunction.  I suspected that she suffered from a polyneuropathy.  She also appears to have peripheral vestibular dysfunction.  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  The absent or prolonged tibial late responses raised suspicion of bilateral S1 radiculopathy.  A comprehensive serologic evaluation was unremarkable except for hemoglobin A1c of 5.7.  Rheumatoid factor was mildly elevated and hemoglobin was 11.5.  MRI of the lumbar spine revealed mild bilateral multilevel foraminal stenosis.  MRI of the foot revealed no muscle abnormality.  He was lost to follow-up.  She was subsequently diagnosed with non-Hodgkin's lymphoma.  She is under the care of Dr. Martinez and is receiving rituximab.  I was called in May 2023.  Dr. Saturnino Go performed a gastric biopsy which raise concern about a neuroendocrine tumor.  Dotatate PET/CT revealed bilateral pulmonary opacifications with low-level activity, mildly avid bilateral hilar foci and avid left inguinal nodes possibly inflammatory.  There were avid osseous calvarial lesions.  MRI of the brain revealed ill-defined lesions in the bilateral frontal bones corresponding to the uptake on the prior PET scan.  Although possibly due to metastases, intraosseous meningioma could not be excluded.  CT of the brain revealed focal areas of sclerosis within the right frontal bone corresponding to the prior area of enhancement on the MRI dated May 9, 2023.  There was no evidence of osseous erosion.  She subsequently underwent a VATS and lung biopsy which revealed focal organizing pneumonia and multiple meningothelial-like nodules.  She is being treated with azithromycin.  She is currently on prednisone.  At her 2023 visit, she complained of painful left frontal swelling for 3 to 4 months.  She complained of repetitiveness for 1 year.  She had longstanding tinnitus and hearing loss.  She was experiencing choking episodes for few months but MBS was normal.  She had chronic imbalance and numbness of her toes.  She denied bladder dysfunction.  She complained of lightheadedness without vertigo.  Her complaints prompted an MRI of the brain with and without contrast performed in May 2023 at [facility].  That study revealed atrophy about white matter changes.  The frontal bone lesions were possibly due to meningioma versus, hyperostosis versus metastases.  A CT of the sinuses performed on 2023 revealed sclerosis of the right frontal bone without osseous erosion.  She is under the care of Dr. Pepe Frausto.  She is being treated with azithromycin.  She recently tapered and discontinued her prednisone.  She is under the care of Dr. Ana Rosa Martinez for lymphoma.  Rituximab was discontinued because of potential pulmonary side effects.  An FDG PET scan performed on April 10, 2024 was negative.  A CT of the chest performed on 2024 revealed mild bronchiectasis in the right lower lobe and mild distal airway impaction in the right lung.  She complains of left frontal skull pain.  She describes sharp and dull discomfort which last for hours.  She complains of tenderness of her left frontal bone.  She complains of chronic imbalance and numbness of her toes.  She complains of dyspnea on exertion.  In December, she was treated for right ophthalmic zoster.  Those symptoms resolved.  She complained of left radial wrist pain which she attributed to ORIF 2 years ago.  She complained of left hand tingling.  Past surgical history is notable for  section and VATS.  She suffers from non-Hodgkin's lymphoma.  There is no history of hypertension, diabetes, hyperlipidemia, cardiac, renal, hepatic, gastrointestinal or thyroid disease.  She suffered a cerebrovascular event 28 years ago.  She has an allergy to aspirin.  Medications include benzonatate 3 times a day, clopidogrel, diazepam as needed, pantoprazole, vitamin D, co-Q10, cranberry, biotin, allopurinol, azithromycin, prednisone 20 mg daily, ipratropium, albuterol and Breztri.  She is  and is a former smoker and social drinker.  Family history is notable for gastrointestinal disease.  Medications include Pantoprazole, clopidogrel, benzonatate, diazepam 5 mg daily, cranberry, vitamin B12, Breztri, ipratropium, Albuterol, amitriptyline 10 mg hs vitamin D3 and budesonide.

## 2024-04-18 NOTE — PHYSICAL EXAM
[FreeTextEntry1] : Constitutional:  Patient was well-developed, well-nourished and in no acute distress.   Head:   Tympanic membranes were obscured by cerumen.  There was right frontal prominence with tenderness of the left forehead  Neck:  Supple with full range of motion.   Cardiovascular:  Cardiac rhythm was regular without murmur. There were no carotid bruits. Peripheral pulses were full and symmetric.   Respiratory:  Lungs were clear.   Abdomen:  Soft and nontender.   Spine: Nontender.  There was tenderness of the left radial wrist.  Skin:  There were no rashes.   NEUROLOGICAL EXAMINATION:  Mental Status: Patient was alert and oriented. Speech was fluent. There was no dysarthria.    Cranial Nerves:   II: Visual acuity was 20/50 in the right eye and 20/25in the left eye without correction.  Pupils were equal and reactive. Visual fields were full. Funduscopic examination was normal.   III, IV, VI:  Eye movements were full without nystagmus.   V: Facial sensation was intact.   VII: Facial strength was normal.   VIII: Hearing was diminished bilaterally.   IX, X: Palatal movement was normal. Phonation was normal.   XI: Sternocleidomastoids and trapezii were normal.   XII: Tongue was midline and movements normal. There was no lingual atrophy or fasciculations.   Motor Examination: Muscle bulk, tone and strength were normal in the upper extremities.  There were no Tinel signs at the wrists or elbows.  There was tenderness of the left radial wrist.  Upper extremity strength including the intrinsic hand muscles was quite good.  There was increased tone in both legs.  Lower extremity strength was full.  Sensory Examination: Joint position sense was intact in the toes.  Vibration was diminished in the feet and there was shading of pinprick in a stocking distribution.  There was decreased pinprick in the right second and third fingers.  Reflexes: DTRs were 1 at the biceps, 2 at the triceps, 2+ at the knees and absent at the ankles and brachial radialis  Plantar Responses: Plantar responses were both extensor more convincingly on the right.  Coordination/Cerebellar Function: There was no dysmetria on finger to nose.  There is mild left heel-to-shin dysmetria.  Gait/Stance: Posture was mildly stooped.  Strides were short and stiff.  Turns were decomposed.  She swayed on Romberg testing.  Tandem was poor.

## 2024-04-23 ENCOUNTER — APPOINTMENT (OUTPATIENT)
Dept: MRI IMAGING | Facility: CLINIC | Age: 79
End: 2024-04-23
Payer: MEDICARE

## 2024-04-23 ENCOUNTER — OUTPATIENT (OUTPATIENT)
Dept: OUTPATIENT SERVICES | Facility: HOSPITAL | Age: 79
LOS: 1 days | End: 2024-04-23
Payer: MEDICARE

## 2024-04-23 DIAGNOSIS — R51.9 HEADACHE, UNSPECIFIED: ICD-10-CM

## 2024-04-23 DIAGNOSIS — Z98.891 HISTORY OF UTERINE SCAR FROM PREVIOUS SURGERY: Chronic | ICD-10-CM

## 2024-04-23 DIAGNOSIS — Z98.890 OTHER SPECIFIED POSTPROCEDURAL STATES: Chronic | ICD-10-CM

## 2024-04-23 DIAGNOSIS — D32.0 BENIGN NEOPLASM OF CEREBRAL MENINGES: ICD-10-CM

## 2024-04-23 PROCEDURE — 70553 MRI BRAIN STEM W/O & W/DYE: CPT | Mod: 26,MH

## 2024-04-23 PROCEDURE — 70553 MRI BRAIN STEM W/O & W/DYE: CPT

## 2024-04-23 PROCEDURE — A9585: CPT

## 2024-04-26 ENCOUNTER — NON-APPOINTMENT (OUTPATIENT)
Age: 79
End: 2024-04-26

## 2024-04-29 ENCOUNTER — TRANSCRIPTION ENCOUNTER (OUTPATIENT)
Age: 79
End: 2024-04-29

## 2024-04-30 NOTE — ASSESSMENT
[FreeTextEntry1] : Ms. GIBBS  is a 77 year old, former smoking (quit 2016), female. She has past medical history of non-Hodgkin's lymphoma (on Rituxin since April 2022 managed by Dr. Juan Payan MD of [facility]), moderate COPD, abnormal Chest CT w/ pulmonary nodules, TIA (40 years ago) on Plavix, & Covid-19 infection (10/2022 s/p MAB @ [facility]). She presents for an initial pulmonary evaluation. She has been followed for many years by Dr. Gomes. She presents with her , Bhargav. Her chief concern is recent abnormal Chest CT and cough x 3 months (since Covid infection 10/2022).\par \par 1. Cough:\par - likely r/t post-covid infection, COPD Exacerbation, and Abnormal Chest CT c/w pneumonia: see care plans below.\par - Continue Benzonatate TID PRN.\par \par 2.Abnormal Chest CT:\par - Add Biaxin 500 mg PO BID x 10 days. \par - Follow up Chest CT 3/2023; If findings persist AFB & FOB. \par \par 3. COPD- Emphysema:\par - Add Prednisone: Take 20 mg x 7 days, and 10 mg x 7 days.\par - Nebulizer dispensed at check out.\par - Add Duoneb at least BID, but up to QID. \par - Add Breztri 2 puffs BID (14 day supply provided) - rinse and gargle post use.\par \par Patient to follow up with Dr. Frausto as scheduled for 3/14/23. \par Patient to call with further questions and concerns.\par Patient verbalizes understanding of care plan and is agreeable.\par \par \par \par 
Alber

## 2024-05-05 ENCOUNTER — RX RENEWAL (OUTPATIENT)
Age: 79
End: 2024-05-05

## 2024-05-05 RX ORDER — AMITRIPTYLINE HYDROCHLORIDE 10 MG/1
10 TABLET, FILM COATED ORAL 3 TIMES DAILY
Qty: 90 | Refills: 5 | Status: ACTIVE | COMMUNITY
Start: 2024-01-10 | End: 1900-01-01

## 2024-05-10 ENCOUNTER — RX RENEWAL (OUTPATIENT)
Age: 79
End: 2024-05-10

## 2024-05-10 RX ORDER — IPRATROPIUM BROMIDE AND ALBUTEROL SULFATE 2.5; .5 MG/3ML; MG/3ML
0.5-2.5 (3) SOLUTION RESPIRATORY (INHALATION)
Qty: 180 | Refills: 1 | Status: ACTIVE | COMMUNITY
Start: 2023-01-25 | End: 1900-01-01

## 2024-05-10 RX ORDER — CIPROFLOXACIN AND DEXAMETHASONE 3; 1 MG/ML; MG/ML
0.3-0.1 SUSPENSION/ DROPS AURICULAR (OTIC)
Qty: 7.5 | Refills: 0 | Status: ACTIVE | COMMUNITY
Start: 2024-05-10 | End: 1900-01-01

## 2024-05-23 ENCOUNTER — APPOINTMENT (OUTPATIENT)
Dept: PULMONOLOGY | Facility: CLINIC | Age: 79
End: 2024-05-23
Payer: MEDICARE

## 2024-05-23 VITALS
TEMPERATURE: 96.3 F | WEIGHT: 148 LBS | SYSTOLIC BLOOD PRESSURE: 124 MMHG | OXYGEN SATURATION: 95 % | HEIGHT: 64 IN | DIASTOLIC BLOOD PRESSURE: 70 MMHG | HEART RATE: 84 BPM | RESPIRATION RATE: 16 BRPM | BODY MASS INDEX: 25.27 KG/M2

## 2024-05-23 DIAGNOSIS — R25.2 CRAMP AND SPASM: ICD-10-CM

## 2024-05-23 DIAGNOSIS — J30.9 ALLERGIC RHINITIS, UNSPECIFIED: ICD-10-CM

## 2024-05-23 DIAGNOSIS — J44.89 OTHER SPECIFIED CHRONIC OBSTRUCTIVE PULMONARY DISEASE: ICD-10-CM

## 2024-05-23 DIAGNOSIS — K21.9 GASTRO-ESOPHAGEAL REFLUX DISEASE W/OUT ESOPHAGITIS: ICD-10-CM

## 2024-05-23 DIAGNOSIS — R06.02 SHORTNESS OF BREATH: ICD-10-CM

## 2024-05-23 DIAGNOSIS — R93.89 ABNORMAL FINDINGS ON DIAGNOSTIC IMAGING OF OTHER SPECIFIED BODY STRUCTURES: ICD-10-CM

## 2024-05-23 DIAGNOSIS — J47.9 BRONCHIECTASIS, UNCOMPLICATED: ICD-10-CM

## 2024-05-23 DIAGNOSIS — R91.1 SOLITARY PULMONARY NODULE: ICD-10-CM

## 2024-05-23 DIAGNOSIS — Z87.09 PERSONAL HISTORY OF OTHER DISEASES OF THE RESPIRATORY SYSTEM: ICD-10-CM

## 2024-05-23 PROCEDURE — 94010 BREATHING CAPACITY TEST: CPT

## 2024-05-23 PROCEDURE — 99214 OFFICE O/P EST MOD 30 MIN: CPT | Mod: 25

## 2024-05-23 PROCEDURE — 94727 GAS DIL/WSHOT DETER LNG VOL: CPT

## 2024-05-23 PROCEDURE — 95012 NITRIC OXIDE EXP GAS DETER: CPT

## 2024-05-23 PROCEDURE — 94729 DIFFUSING CAPACITY: CPT

## 2024-05-23 NOTE — REASON FOR VISIT
[Follow-Up] : a follow-up visit [Spouse] : spouse [[id number]] : SOB, GERD, allergies/PND, ? BRANDAN, and abnormal CT (bronchiectasis)

## 2024-05-23 NOTE — ASSESSMENT
[FreeTextEntry1] : Ms. GIBBS is a 79 year old, former smoker (quit 2016), female. She has past medical history of non-Hodgkin's lymphoma (on Rituxin since April 2022 managed by Dr. Juan Payan MD of [facility]), moderate COPD, abnormal Chest CT w/ pulmonary nodules, TIA (40 years ago) on Plavix, & Covid-19 infection (10/2022 s/p MAB @ [facility]),s/p COVID-19 10/2022 complicated by abnormal CT, COPD/PNA. She presents for a f/u pulmonary evaluation for SOB, GERD, allergies/PND, NO BRANDAN, and abnormal CT c/w inflammatory dz at present - (+) BOOP /  - on Rx (?Rituxin related)- stable off Prednisone  The patient's SOB is felt to be multifactorial: -poor mechanics of breathing -out of shape/overweight -Pulmonary  -COPD  -Emphysema -Cardiac (Gamaliel)  Problem 1: COPD/Emphysema -continue Breztri 2 puffs BID -add DuoNeb via nebulizer, Q6H (before Breztri) -add Budesonide 0.5% via nebulizer BID PRN -COPD is a progressive disease and although it cant be cured, appropriate management can slow its progression, reduce frequency and severity of exacerbations, and improve symptoms and the patient quality of life. Hospitalizations are the greatest contributor to the total COPD costs and account for up to 87% of total COPD related costs. Exacerbations are the main cause of admissions and subsequently account for the 40%-75% of COPD costs. Inhaled maintenance therapy reduces the incidence of exacerbations in patients with stable CPPD. Incorrect inhaler use and nonadherence are major obstacles to achieving COPD treatments goals. Many COPD patients have challenges (impaired inhalation, limited dexterity, reduced cognition: that limit their ability to correctly use their COPD treatment devices resulting in reduced symptom control. Of most importance is smoking cessation and early intervention with respiratory illnesses and contemplation for pulmonary rehab to enhance quality of life.   Problem 1A: Bronchiectasis (from rx) -recommend Aerobika  - Add NAC 900mg BID -Seen on the CT of the chest or chest x-ray signifies damaged bronchial tubes focal ordiffuse which can be sites of recurrent infections. These areas can be colonized by various organisms including bacteria (hemophilous influenza/Pseudomonas species etc.) as well as acid fast bacilli (myobacterial disease- inclusive of TB/MARIO etc.). Sputum either for bacteria culture/sensitivity or AFB culture and sensitivity will need to be sent if the patient has sputum- 3 specimens on consecutive days will need to be dropped at the laboratory- if the patient can produce sputum.  Problem 2: Allergies/PND -add Dymista 1 sniff BID -Environmental measures for allergies were encouraged including mattress and pillow cover, air purifier, and environmental controls.  Problem 3: GERD (Saturnino Go) - H. Pylori -continue Prevacid 30 mg QAM before breakfast -add Pepcid 40 mg QHS -Rule of 2s: avoid eating too much, eating too late, eating too spicy, eating two hours before bed. - Things to avoid including overeating, spicy foods, tight clothing, eating within two hours of bed, this list is not all inclusive. - For treatments of reflux, possible options discussed including diet control, H2 blockers, PPIs, as well as coating motility agents discussed as treatment options. Timing of meals and proximity of last meal to sleep were discussed. If symptoms persist, a formal gastrointestinal evaluation is needed.  Problem 4: NO BRANDAN (risk factors: poor sleep, elevated Mallampati class) -s/p home sleep study 8/2023 - negative -Sleep apnea is associated with adverse clinical consequences which can affect most organ systems. Cardiovascular disease risk includes arrhythmias, atrial fibrillation, hypertension, coronary artery disease, and stroke. Metabolic disorders include diabetes type 2, non-alcoholic fatty liver disease. Mood disorder especially depression; and cognitive decline especially in the elderly. Associations with chronic reflux/Aguilar's esophagus some but not all inclusive. -Reasons include arousal consistent with hypopnea; respiratory events most prominent in REM sleep or supine position; therefore sleep staging and body position are important for accurate diagnosis and estimation of AHI.  Problem 5: Abnormal CT c/w inflammatory disease - improved, overall decreased densities in TERA and left side, new (?aspiration)-though pt at risk for lung CA based on prior smoking Hx - s/p VATS bx (Alverto) - c/w  / BOOP -continue GI evaluation (Dr. Saturnino Go) -s/p FEESST study WNL -s/p fluoroscopy of the diaphragm WNL -complete f/u CT  - 3/2024 next 3/2025 - s/p Bx - continue Prednisone at 10 mg - drop by 1 mg to 5 mg  -continue Zithromax 500 mg M/W/F -CAT scans are the only radiological modality to identify abnormalities w/in the lings with regards to nodules/masses/lymph nodes. Risks, benefits were reviewed in detail. The guidelines for abnormalities include follow up CT scans at various intervals which could range from 6 weeks to 1 year intervals. If there is a change for the worse then considerations for a biopsy will be considered if you are a candidate. Second opinion evaluation with thoracic surgeon or an interventional radiologist could be offered,    Problem 6: Cardiac (Gamaliel) -Recommend cardiac follow up evaluation with cardiologist if needed  Problem 7: overweight/out of shape -Recommended Jimmy York's 10-day detox diet and book. -Recommend "Muniq" OTC for weight loss, energy, and blood sugar - Weight loss, exercise and diet control were discussed and are highly encouraged. Treatment options were given such as aqua therapy, and contacting a nutritionist. Recommended to use the elliptical, stationary bike, less use of treadmill. Mindful eating was explained to the patient. Obesity is associated with worsening asthma, SOB, and potential for cardiac disease, diabetes, and other underlying medical conditions.  Problem 8: Poor mechanics of breathing -Recommended Melodie Riley and Rey breathing techniques -Recommended [email or web address] and to YouTube "aerobic exercises for seniors" -Proper breathing techniques were reviewed with an emphasis on exhalation. Patient instructed to breath in for 1 second and out for four seconds. Patient was encouraged not to talk while walking.  Problem 9: Health Maintenance -recommended RSV vaccine in the Fall for anyone over the age of 60 -recommended Sanotize anti viral nasal spray in case of viral infection -s/p flu shot 2023 -recommended strep pneumonia vaccines: Prevnar-20 vaccine, follow by Pneumo vaccine 23 one year following -recommended early intervention for URIs -recommended regular osteoporosis evaluations -recommended early dermatological evaluations -recommended after the age of 50 to the age of 70, colonoscopy every 5 years  f/u in 4-6 months  pt is encouraged to call or fax the office with any questions or concerns.

## 2024-05-23 NOTE — PHYSICAL EXAM
[No Acute Distress] : no acute distress [Well Nourished] : well nourished [No Deformities] : no deformities [Normal Oropharynx] : normal oropharynx [III] : Mallampati Class: III [Normal Appearance] : normal appearance [No Neck Mass] : no neck mass [Normal Rate/Rhythm] : normal rate/rhythm [Normal S1, S2] : normal s1, s2 [No Murmurs] : no murmurs [No Resp Distress] : no resp distress [Clear to Auscultation Bilaterally] : clear to auscultation bilaterally [Wheeze] : wheeze [No Abnormalities] : no abnormalities [Benign] : benign [Normal Gait] : normal gait [No Clubbing] : no clubbing [No Cyanosis] : no cyanosis [FROM] : FROM [Normal Color/ Pigmentation] : normal color/ pigmentation [No Focal Deficits] : no focal deficits [Oriented x3] : oriented x3 [Normal Affect] : normal affect [TextBox_68] : I:E 1:3; clear [TextBox_105] : LE trace edema

## 2024-05-23 NOTE — HISTORY OF PRESENT ILLNESS
[< 20 pack-years] : < 20 pack-years [Former] : former [Never] : never [TextBox_4] : Ms. GIBBS  is a 79 year old, former smoking (quit 2016), female. She has past medical history of non-Hodgkin's lymphoma (on Rituxin since April 2022 managed by Dr. Juan Payan MD of [facility]), moderate COPD, abnormal Chest CT w/ pulmonary nodules, TIA (40 years ago) on Plavix, & Covid-19 infection (10/2022 s/p MAB @ [facility]). She presents for an follow up pulmonary evaluation. She has been followed for many years by Dr. Gomes. She presents with her , Bhargav.   -she notes feeling tired a lot -she notes she stopped taking Amitriptyline due to hallucinating at night -she notes getting off the Prednisone -she notes not using the Budesonide and uses the Levalbuterol -she notes some Dysphagia and is getting a swallowing test today- Dr. Go -she notes she has a Hernia, and some food gets stuck on the way down -she notes her weight is unstable -she notes she cant do much exercise    -Patient denies any headaches, nausea, vomiting, fever, chills, sweats, chest pain, chest pressure, palpitations, coughing, wheezing, fatigue, diarrhea, constipation, dysphagia, arthralgias, myalgias, dizziness, leg swelling, leg pain, itchy eyes, itchy ears, dysphonia, heartburn, reflux or sour taste in mouth. [TextBox_11] : .25 [TextBox_13] : 55 [YearQuit] : 2016

## 2024-05-23 NOTE — PROCEDURE
[FreeTextEntry1] :  Full PFT reveals moderate obstructive dysfunction; FEV1 was 1.25 L which is 69% of predicted, moderate lung volumes, mild diffusions, at 10.7 L which is 60% predicted, normal flow volume loop. PFT's for performed to evaluate for SOB.    FENO was 22; a normal value being less than 25. Fractional exhaled nitric oxide (FENO) is regarded as a simple, noninvasive method for assessing eosinophilic airway inflammation. Produced by a variety of cells within the lung, nitric oxide (NO) concentrations are generally low in healthy individuals. However, high concentrations of NO appear to be involved in nonspecific host defense mechanisms and chronic inflammatory diseases such as asthma. The American Thoracic Society (ATS) therefore has strongly recommended using FENO to aid in the assessment, management, and long-term monitoring of eosinophilic airway inflammation and asthma, and for identifying steroid responsive individuals whose chronic respiratory symptoms may be caused by airway inflammation. In their 2011 clinical practice guideline, the ATS emphasizes the importance of using FENO.

## 2024-05-23 NOTE — ADDENDUM
[FreeTextEntry1] :  Documented by Calin Benitez acting as a scribe for Dr. Pepe Frausto on 05/23/2024 .   All medical record entries made by the Scribe were at my, Dr. Pepe Frausto's direction and personally dictated by me on 05/23/2024 . I have reviewed the chart and agree that the record accurately reflects my personal performance of the history, Physical exam, assessment, and plan. I have also personally directed, reviewed, and agree with the discharge instructions.

## 2024-07-05 ENCOUNTER — TRANSCRIPTION ENCOUNTER (OUTPATIENT)
Age: 79
End: 2024-07-05

## 2024-07-11 ENCOUNTER — APPOINTMENT (OUTPATIENT)
Dept: NEUROLOGY | Facility: CLINIC | Age: 79
End: 2024-07-11
Payer: MEDICARE

## 2024-07-11 VITALS
HEART RATE: 87 BPM | HEIGHT: 64 IN | BODY MASS INDEX: 25.44 KG/M2 | WEIGHT: 149 LBS | SYSTOLIC BLOOD PRESSURE: 128 MMHG | DIASTOLIC BLOOD PRESSURE: 71 MMHG

## 2024-07-11 DIAGNOSIS — R26.9 UNSPECIFIED ABNORMALITIES OF GAIT AND MOBILITY: ICD-10-CM

## 2024-07-11 DIAGNOSIS — L29.9 PRURITUS, UNSPECIFIED: ICD-10-CM

## 2024-07-11 DIAGNOSIS — K22.4 DYSKINESIA OF ESOPHAGUS: ICD-10-CM

## 2024-07-11 PROCEDURE — 99214 OFFICE O/P EST MOD 30 MIN: CPT

## 2024-07-12 ENCOUNTER — TRANSCRIPTION ENCOUNTER (OUTPATIENT)
Age: 79
End: 2024-07-12

## 2024-07-13 ENCOUNTER — TRANSCRIPTION ENCOUNTER (OUTPATIENT)
Age: 79
End: 2024-07-13

## 2024-07-16 NOTE — H&P PST ADULT - CONSTITUTIONAL DETAILS
Pt reports sudden onset of palpitations around 3 hours ago. Pt has extensive recent hx with A-fib. Pt recently had an ablation 1 week ago. Pt reports she has been in the ED 3 times since the ablation. Pt denies any current CP. Pt reports slight current dyspnea. Pt takes Eliquis.   no distress

## 2024-07-25 ENCOUNTER — TRANSCRIPTION ENCOUNTER (OUTPATIENT)
Age: 79
End: 2024-07-25

## 2024-07-31 ENCOUNTER — APPOINTMENT (OUTPATIENT)
Dept: NEUROLOGY | Facility: CLINIC | Age: 79
End: 2024-07-31
Payer: MEDICARE

## 2024-07-31 DIAGNOSIS — L29.9 PRURITUS, UNSPECIFIED: ICD-10-CM

## 2024-07-31 PROCEDURE — 11104 PUNCH BX SKIN SINGLE LESION: CPT

## 2024-07-31 PROCEDURE — 11105 PUNCH BX SKIN EA SEP/ADDL: CPT

## 2024-07-31 NOTE — PROCEDURE
[FreeTextEntry1] : Skin Biopsy [FreeTextEntry3] : Skin Biopsy Procedure Note: Patient's Name: Trinidad Rogers Gender: F : 1945 MRN: [id number]  Biopsy Date: 24 Referring physician: Dr. Bell  Brief Clinical History: Numbness and pain throughout the body, rule out SFN  Sign In:  The procedure was discussed with the patient, including the risks, benefits, alternatives and personnel involved in this procedure. All of the patients questions were answered. Patient agreed to proceed with the procedure  Biopsy site: R Leg, 2 sites.  Procedure verified with the patient: Yes Sign in completed: Yes  Procedure: After the patient was placed in a lateral position the following biopsy sites were identified: distal leg and proximal thigh  These sites were cleansed with alcohol and injected with 0.5cc 1% Lidocaine with 1:100,000 epinephrine. Three skin biopsies were obtained using a 3-mm biopsy punch and removed with the forceps and surgical blade technique.  Bleeding was minimal and hemostasis was obtained by pressure. Sterile dressing was applied to each biopsy site  Patient tolerated procedure well, without complications. Written aftercare was given and explained. Patient was discharged home.

## 2024-08-12 ENCOUNTER — APPOINTMENT (OUTPATIENT)
Dept: NUCLEAR MEDICINE | Facility: IMAGING CENTER | Age: 79
End: 2024-08-12

## 2024-08-12 ENCOUNTER — RESULT REVIEW (OUTPATIENT)
Age: 79
End: 2024-08-12

## 2024-08-12 PROCEDURE — 78814 PET IMAGE W/CT LMTD: CPT | Mod: 26,MH

## 2024-08-12 PROCEDURE — 78999 UNLISTED MISC PX DX NUC MED: CPT | Mod: 26,MH

## 2024-08-14 ENCOUNTER — RX RENEWAL (OUTPATIENT)
Age: 79
End: 2024-08-14

## 2024-08-21 ENCOUNTER — NON-APPOINTMENT (OUTPATIENT)
Age: 79
End: 2024-08-21

## 2024-08-22 ENCOUNTER — APPOINTMENT (OUTPATIENT)
Dept: NEUROLOGY | Facility: CLINIC | Age: 79
End: 2024-08-22

## 2024-08-27 ENCOUNTER — TRANSCRIPTION ENCOUNTER (OUTPATIENT)
Age: 79
End: 2024-08-27

## 2024-10-23 ENCOUNTER — APPOINTMENT (OUTPATIENT)
Dept: PULMONOLOGY | Facility: CLINIC | Age: 79
End: 2024-10-23
Payer: MEDICARE

## 2024-10-23 VITALS
HEART RATE: 75 BPM | BODY MASS INDEX: 23.9 KG/M2 | OXYGEN SATURATION: 96 % | RESPIRATION RATE: 16 BRPM | WEIGHT: 140 LBS | SYSTOLIC BLOOD PRESSURE: 124 MMHG | DIASTOLIC BLOOD PRESSURE: 80 MMHG | HEIGHT: 64 IN

## 2024-10-23 DIAGNOSIS — R25.2 CRAMP AND SPASM: ICD-10-CM

## 2024-10-23 DIAGNOSIS — J44.89 OTHER SPECIFIED CHRONIC OBSTRUCTIVE PULMONARY DISEASE: ICD-10-CM

## 2024-10-23 DIAGNOSIS — J84.116 CRYPTOGENIC ORGANIZING PNEUMONIA: ICD-10-CM

## 2024-10-23 DIAGNOSIS — J30.9 ALLERGIC RHINITIS, UNSPECIFIED: ICD-10-CM

## 2024-10-23 DIAGNOSIS — K21.9 GASTRO-ESOPHAGEAL REFLUX DISEASE W/OUT ESOPHAGITIS: ICD-10-CM

## 2024-10-23 DIAGNOSIS — R06.02 SHORTNESS OF BREATH: ICD-10-CM

## 2024-10-23 DIAGNOSIS — R26.89 OTHER ABNORMALITIES OF GAIT AND MOBILITY: ICD-10-CM

## 2024-10-23 DIAGNOSIS — J10.1 INFLUENZA DUE TO OTHER IDENTIFIED INFLUENZA VIRUS WITH OTHER RESPIRATORY MANIFESTATIONS: ICD-10-CM

## 2024-10-23 DIAGNOSIS — R93.89 ABNORMAL FINDINGS ON DIAGNOSTIC IMAGING OF OTHER SPECIFIED BODY STRUCTURES: ICD-10-CM

## 2024-10-23 PROCEDURE — ZZZZZ: CPT

## 2024-10-23 PROCEDURE — 94729 DIFFUSING CAPACITY: CPT

## 2024-10-23 PROCEDURE — 94010 BREATHING CAPACITY TEST: CPT

## 2024-10-23 PROCEDURE — 95012 NITRIC OXIDE EXP GAS DETER: CPT

## 2024-10-23 PROCEDURE — 99214 OFFICE O/P EST MOD 30 MIN: CPT | Mod: 25

## 2024-10-23 PROCEDURE — 94727 GAS DIL/WSHOT DETER LNG VOL: CPT

## 2024-11-11 ENCOUNTER — APPOINTMENT (OUTPATIENT)
Dept: NEUROLOGY | Facility: CLINIC | Age: 79
End: 2024-11-11

## 2025-01-03 ENCOUNTER — RX RENEWAL (OUTPATIENT)
Age: 80
End: 2025-01-03

## 2025-01-24 ENCOUNTER — RX RENEWAL (OUTPATIENT)
Age: 80
End: 2025-01-24

## 2025-02-11 ENCOUNTER — RX RENEWAL (OUTPATIENT)
Age: 80
End: 2025-02-11

## 2025-03-07 ENCOUNTER — NON-APPOINTMENT (OUTPATIENT)
Age: 80
End: 2025-03-07

## 2025-03-13 ENCOUNTER — APPOINTMENT (OUTPATIENT)
Dept: NEUROLOGY | Facility: CLINIC | Age: 80
End: 2025-03-13

## 2025-03-19 ENCOUNTER — APPOINTMENT (OUTPATIENT)
Dept: PULMONOLOGY | Facility: CLINIC | Age: 80
End: 2025-03-19
Payer: MEDICARE

## 2025-03-19 VITALS
TEMPERATURE: 98.2 F | BODY MASS INDEX: 23.9 KG/M2 | HEIGHT: 64 IN | OXYGEN SATURATION: 95 % | DIASTOLIC BLOOD PRESSURE: 72 MMHG | RESPIRATION RATE: 16 BRPM | HEART RATE: 111 BPM | WEIGHT: 140 LBS | SYSTOLIC BLOOD PRESSURE: 122 MMHG

## 2025-03-19 DIAGNOSIS — R06.02 SHORTNESS OF BREATH: ICD-10-CM

## 2025-03-19 DIAGNOSIS — J84.116 CRYPTOGENIC ORGANIZING PNEUMONIA: ICD-10-CM

## 2025-03-19 DIAGNOSIS — J44.89 OTHER SPECIFIED CHRONIC OBSTRUCTIVE PULMONARY DISEASE: ICD-10-CM

## 2025-03-19 DIAGNOSIS — L03.115 CELLULITIS OF RIGHT LOWER LIMB: ICD-10-CM

## 2025-03-19 DIAGNOSIS — R93.89 ABNORMAL FINDINGS ON DIAGNOSTIC IMAGING OF OTHER SPECIFIED BODY STRUCTURES: ICD-10-CM

## 2025-03-19 DIAGNOSIS — J82.83 EOSINOPHILIC ASTHMA: ICD-10-CM

## 2025-03-19 DIAGNOSIS — R26.89 OTHER ABNORMALITIES OF GAIT AND MOBILITY: ICD-10-CM

## 2025-03-19 DIAGNOSIS — J30.9 ALLERGIC RHINITIS, UNSPECIFIED: ICD-10-CM

## 2025-03-19 DIAGNOSIS — K21.9 GASTRO-ESOPHAGEAL REFLUX DISEASE W/OUT ESOPHAGITIS: ICD-10-CM

## 2025-03-19 DIAGNOSIS — Z01.811 ENCOUNTER FOR PREPROCEDURAL RESPIRATORY EXAMINATION: ICD-10-CM

## 2025-03-19 PROCEDURE — ZZZZZ: CPT

## 2025-03-19 PROCEDURE — 94618 PULMONARY STRESS TESTING: CPT

## 2025-03-19 PROCEDURE — 99214 OFFICE O/P EST MOD 30 MIN: CPT | Mod: 25

## 2025-03-19 PROCEDURE — 94727 GAS DIL/WSHOT DETER LNG VOL: CPT

## 2025-03-19 PROCEDURE — 94729 DIFFUSING CAPACITY: CPT

## 2025-03-19 PROCEDURE — 94010 BREATHING CAPACITY TEST: CPT

## 2025-03-19 PROCEDURE — 95012 NITRIC OXIDE EXP GAS DETER: CPT

## 2025-03-19 RX ORDER — CEPHALEXIN 500 MG/1
500 CAPSULE ORAL
Qty: 14 | Refills: 0 | Status: ACTIVE | COMMUNITY
Start: 2025-03-19 | End: 1900-01-01

## 2025-03-19 RX ORDER — DUPILUMAB 300 MG/2ML
300 INJECTION, SOLUTION SUBCUTANEOUS
Qty: 1 | Refills: 4 | Status: ACTIVE | COMMUNITY
Start: 2025-03-19 | End: 1900-01-01

## 2025-03-19 RX ORDER — EPINEPHRINE 0.3 MG/.3ML
0.3 INJECTION INTRAMUSCULAR
Qty: 1 | Refills: 0 | Status: ACTIVE | COMMUNITY
Start: 2025-03-19 | End: 1900-01-01

## 2025-03-20 RX ORDER — DUPILUMAB 300 MG/2ML
300 INJECTION, SOLUTION SUBCUTANEOUS
Qty: 1 | Refills: 0 | Status: ACTIVE | COMMUNITY
Start: 2025-03-19

## 2025-03-25 ENCOUNTER — OUTPATIENT (OUTPATIENT)
Dept: OUTPATIENT SERVICES | Facility: HOSPITAL | Age: 80
LOS: 1 days | End: 2025-03-25

## 2025-03-25 ENCOUNTER — APPOINTMENT (OUTPATIENT)
Dept: INTERNAL MEDICINE | Facility: CLINIC | Age: 80
End: 2025-03-25

## 2025-03-25 ENCOUNTER — NON-APPOINTMENT (OUTPATIENT)
Age: 80
End: 2025-03-25

## 2025-03-25 DIAGNOSIS — Z98.891 HISTORY OF UTERINE SCAR FROM PREVIOUS SURGERY: Chronic | ICD-10-CM

## 2025-03-25 DIAGNOSIS — Z98.890 OTHER SPECIFIED POSTPROCEDURAL STATES: Chronic | ICD-10-CM

## 2025-04-01 ENCOUNTER — APPOINTMENT (OUTPATIENT)
Dept: VASCULAR SURGERY | Facility: CLINIC | Age: 80
End: 2025-04-01
Payer: MEDICARE

## 2025-04-01 ENCOUNTER — NON-APPOINTMENT (OUTPATIENT)
Age: 80
End: 2025-04-01

## 2025-04-01 DIAGNOSIS — I87.2 VENOUS INSUFFICIENCY (CHRONIC) (PERIPHERAL): ICD-10-CM

## 2025-04-01 PROCEDURE — 93971 EXTREMITY STUDY: CPT

## 2025-04-01 PROCEDURE — 99204 OFFICE O/P NEW MOD 45 MIN: CPT

## 2025-04-01 RX ORDER — FLUOCINONIDE 1 MG/G
0.1 CREAM TOPICAL TWICE DAILY
Qty: 45 | Refills: 2 | Status: ACTIVE | COMMUNITY
Start: 2025-04-01 | End: 1900-01-01

## 2025-05-13 ENCOUNTER — APPOINTMENT (OUTPATIENT)
Dept: VASCULAR SURGERY | Facility: CLINIC | Age: 80
End: 2025-05-13
Payer: MEDICARE

## 2025-05-13 PROCEDURE — 99214 OFFICE O/P EST MOD 30 MIN: CPT

## 2025-05-30 ENCOUNTER — TRANSCRIPTION ENCOUNTER (OUTPATIENT)
Age: 80
End: 2025-05-30

## 2025-05-30 DIAGNOSIS — M54.12 RADICULOPATHY, CERVICAL REGION: ICD-10-CM

## 2025-06-03 ENCOUNTER — OUTPATIENT (OUTPATIENT)
Dept: OUTPATIENT SERVICES | Facility: HOSPITAL | Age: 80
LOS: 1 days | End: 2025-06-03
Payer: MEDICARE

## 2025-06-03 ENCOUNTER — APPOINTMENT (OUTPATIENT)
Dept: MRI IMAGING | Facility: CLINIC | Age: 80
End: 2025-06-03
Payer: MEDICARE

## 2025-06-03 DIAGNOSIS — M54.12 RADICULOPATHY, CERVICAL REGION: ICD-10-CM

## 2025-06-03 DIAGNOSIS — Z98.891 HISTORY OF UTERINE SCAR FROM PREVIOUS SURGERY: Chronic | ICD-10-CM

## 2025-06-03 DIAGNOSIS — Z98.890 OTHER SPECIFIED POSTPROCEDURAL STATES: Chronic | ICD-10-CM

## 2025-06-03 PROCEDURE — 72141 MRI NECK SPINE W/O DYE: CPT | Mod: 26

## 2025-06-03 PROCEDURE — 72141 MRI NECK SPINE W/O DYE: CPT

## 2025-06-04 ENCOUNTER — NON-APPOINTMENT (OUTPATIENT)
Age: 80
End: 2025-06-04

## 2025-06-05 ENCOUNTER — TRANSCRIPTION ENCOUNTER (OUTPATIENT)
Age: 80
End: 2025-06-05

## 2025-06-07 ENCOUNTER — OUTPATIENT (OUTPATIENT)
Dept: OUTPATIENT SERVICES | Facility: HOSPITAL | Age: 80
LOS: 1 days | End: 2025-06-07
Payer: MEDICARE

## 2025-06-07 ENCOUNTER — APPOINTMENT (OUTPATIENT)
Dept: CT IMAGING | Facility: CLINIC | Age: 80
End: 2025-06-07

## 2025-06-07 DIAGNOSIS — Z98.891 HISTORY OF UTERINE SCAR FROM PREVIOUS SURGERY: Chronic | ICD-10-CM

## 2025-06-07 DIAGNOSIS — Z98.890 OTHER SPECIFIED POSTPROCEDURAL STATES: Chronic | ICD-10-CM

## 2025-06-07 DIAGNOSIS — R93.89 ABNORMAL FINDINGS ON DIAGNOSTIC IMAGING OF OTHER SPECIFIED BODY STRUCTURES: ICD-10-CM

## 2025-06-07 PROCEDURE — 71250 CT THORAX DX C-: CPT

## 2025-06-07 PROCEDURE — 71250 CT THORAX DX C-: CPT | Mod: 26

## 2025-06-26 ENCOUNTER — APPOINTMENT (OUTPATIENT)
Dept: ENDOVASCULAR SURGERY | Facility: CLINIC | Age: 80
End: 2025-06-26
Payer: MEDICARE

## 2025-06-26 ENCOUNTER — LABORATORY RESULT (OUTPATIENT)
Age: 80
End: 2025-06-26

## 2025-06-26 VITALS
WEIGHT: 140 LBS | BODY MASS INDEX: 24.8 KG/M2 | SYSTOLIC BLOOD PRESSURE: 130 MMHG | HEART RATE: 83 BPM | TEMPERATURE: 98.1 F | OXYGEN SATURATION: 94 % | RESPIRATION RATE: 18 BRPM | DIASTOLIC BLOOD PRESSURE: 70 MMHG | HEIGHT: 63 IN

## 2025-06-26 PROCEDURE — 36482Z ENDOVEN THER CHEM ADHES 1ST: CUSTOM | Mod: RT

## 2025-07-02 ENCOUNTER — APPOINTMENT (OUTPATIENT)
Dept: PULMONOLOGY | Facility: CLINIC | Age: 80
End: 2025-07-02

## 2025-07-08 ENCOUNTER — APPOINTMENT (OUTPATIENT)
Dept: VASCULAR SURGERY | Facility: CLINIC | Age: 80
End: 2025-07-08
Payer: MEDICARE

## 2025-07-08 PROBLEM — I82.461 ACUTE DEEP VEIN THROMBOSIS (DVT) OF CALF MUSCLE VEIN OF RIGHT LOWER EXTREMITY: Status: ACTIVE | Noted: 2025-07-08

## 2025-07-08 PROCEDURE — 99024 POSTOP FOLLOW-UP VISIT: CPT

## 2025-07-08 PROCEDURE — 93971 EXTREMITY STUDY: CPT | Mod: RT

## 2025-07-08 RX ORDER — APIXABAN 5 MG/1
5 TABLET, FILM COATED ORAL
Qty: 70 | Refills: 0 | Status: ACTIVE | COMMUNITY
Start: 2025-07-08 | End: 1900-01-01

## 2025-07-17 ENCOUNTER — APPOINTMENT (OUTPATIENT)
Dept: VASCULAR SURGERY | Facility: CLINIC | Age: 80
End: 2025-07-17
Payer: MEDICARE

## 2025-07-17 PROCEDURE — 99214 OFFICE O/P EST MOD 30 MIN: CPT

## 2025-07-17 PROCEDURE — 93971 EXTREMITY STUDY: CPT

## 2025-07-31 ENCOUNTER — APPOINTMENT (OUTPATIENT)
Dept: VASCULAR SURGERY | Facility: CLINIC | Age: 80
End: 2025-07-31
Payer: MEDICARE

## 2025-07-31 PROCEDURE — 99214 OFFICE O/P EST MOD 30 MIN: CPT

## 2025-07-31 PROCEDURE — 93971 EXTREMITY STUDY: CPT | Mod: RT

## 2025-08-11 ENCOUNTER — RX RENEWAL (OUTPATIENT)
Age: 80
End: 2025-08-11

## 2025-08-26 ENCOUNTER — APPOINTMENT (OUTPATIENT)
Dept: VASCULAR SURGERY | Facility: CLINIC | Age: 80
End: 2025-08-26
Payer: MEDICARE

## 2025-08-26 PROCEDURE — 93971 EXTREMITY STUDY: CPT | Mod: RT

## 2025-08-26 PROCEDURE — 99214 OFFICE O/P EST MOD 30 MIN: CPT

## 2025-09-17 ENCOUNTER — TRANSCRIPTION ENCOUNTER (OUTPATIENT)
Age: 80
End: 2025-09-17

## 2025-09-17 DIAGNOSIS — R10.31 RIGHT LOWER QUADRANT PAIN: ICD-10-CM

## 2025-09-18 ENCOUNTER — APPOINTMENT (OUTPATIENT)
Dept: MRI IMAGING | Facility: CLINIC | Age: 80
End: 2025-09-18
Payer: MEDICARE

## 2025-09-18 PROCEDURE — 73721 MRI JNT OF LWR EXTRE W/O DYE: CPT | Mod: 26,RT

## 2025-09-19 ENCOUNTER — TRANSCRIPTION ENCOUNTER (OUTPATIENT)
Age: 80
End: 2025-09-19

## 2025-09-19 DIAGNOSIS — G89.29 PAIN IN RIGHT HIP: ICD-10-CM

## 2025-09-19 DIAGNOSIS — M25.551 PAIN IN RIGHT HIP: ICD-10-CM

## 2025-09-22 PROBLEM — M16.10 ARTHRITIS PAIN OF HIP: Status: ACTIVE | Noted: 2025-09-22

## 2025-09-24 PROBLEM — M16.11 ARTHRITIS OF RIGHT HIP: Status: ACTIVE | Noted: 2025-09-22

## (undated) DEVICE — CHEST DRAIN OASIS DRY SUCTION WATER SEAL

## (undated) DEVICE — DRSG CURITY GAUZE SPONGE 4 X 4" 12-PLY

## (undated) DEVICE — WARMING BLANKET UPPER ADULT

## (undated) DEVICE — SUT SILK 0 24" SH DA

## (undated) DEVICE — XI ENDOWRIST 12 - 8 MM CANNULA REDUCER

## (undated) DEVICE — DRSG IMMOBILIZER SHOULDER QUICK RELEASE LG

## (undated) DEVICE — SOL IRR POUR NS 0.9% 500ML

## (undated) DEVICE — DRSG GAUZE PACKTNER ROLL

## (undated) DEVICE — TROCAR COVIDIEN VERSASTEP PLUS 15MM STANDARD

## (undated) DEVICE — XI ARM FORCEP CADIERE 8MM

## (undated) DEVICE — GOWN XL

## (undated) DEVICE — GLV 8 ESTEEM BLUE

## (undated) DEVICE — ENDOCATCH GENERAL 15MM (PURPLE)

## (undated) DEVICE — ELCTR BOVIE TIP BLADE INSULATED 2.75" EDGE

## (undated) DEVICE — PACK UPPER EXTREMITY

## (undated) DEVICE — NDL HYPO REGULAR BEVEL 22G X 1.5" (TURQUOISE)

## (undated) DEVICE — SUT PDS II 2-0 27" SH

## (undated) DEVICE — BLADE SURGICAL #10 STAINLESS

## (undated) DEVICE — CUFF TOURNIQUET 18" DUAL PORT LUER LOCK

## (undated) DEVICE — DRAPE TOWEL BLUE 17" X 24"

## (undated) DEVICE — DRSG ESMARK 4"

## (undated) DEVICE — ELCTR BOVIE PENCIL SMOKE EVACUATION

## (undated) DEVICE — SUT SURGIPRO 0 30" GS-22

## (undated) DEVICE — DRILL BIT SYNTHES ORTHO QC 2X100MM

## (undated) DEVICE — VENODYNE/SCD SLEEVE CALF MEDIUM

## (undated) DEVICE — DRSG TEGADERM 2.5X3"

## (undated) DEVICE — SUT MONOCRYL 4-0 27" PS-2 UNDYED

## (undated) DEVICE — DRAPE INSTRUMENT POUCH 6.75" X 11"

## (undated) DEVICE — DRILL BIT SYNTHES ORTHO 1.8MM

## (undated) DEVICE — ENDOCATCH GENERAL 10MM (PURPLE)

## (undated) DEVICE — WARMING BLANKET LOWER ADULT

## (undated) DEVICE — XI 12MM AND STAPLER CANNULA SEAL

## (undated) DEVICE — TROCAR COVIDIEN VERSAONE BLADELESS FIXATION 12MM STANDARD

## (undated) DEVICE — DRSG MASTISOL

## (undated) DEVICE — BLANKET WARMER FULL ADULT

## (undated) DEVICE — XI ARM PERMANENT CAUTERY SPATULA

## (undated) DEVICE — STAPLER COVIDIEN ENDO GIA STANDARD HANDLE

## (undated) DEVICE — WRAP COMPRESSION CALF MED

## (undated) DEVICE — XI ARM GRASPER BIPOLAR LONG 8MM

## (undated) DEVICE — D HELP - CLEARVIEW CLEARIFY SYSTEM

## (undated) DEVICE — FOLEY TRAY 16FR 5CC LTX UMETER CLOSED

## (undated) DEVICE — BLADE SURGICAL #15 CARBON

## (undated) DEVICE — GRASPER LAPA 5MMX35CM

## (undated) DEVICE — TUBING OLYMPUS INSUFFLATION

## (undated) DEVICE — XI ARM CLIP APPLIER LARGE

## (undated) DEVICE — SPECIMEN CONTAINER 100ML

## (undated) DEVICE — GLV 7.5 PROTEXIS

## (undated) DEVICE — XI ARM GRASPER TIP UP FENESTRATED

## (undated) DEVICE — SUT POLYSORB 4-0 P-12 UNDYED

## (undated) DEVICE — TUBING STRYKEFLOW II SUCTION / IRRIGATOR

## (undated) DEVICE — DRAPE 3/4 SHEET 52X76"

## (undated) DEVICE — POSITIONER FOAM HEAD CRADLE (PINK)

## (undated) DEVICE — TUBING SUCTION 20FT

## (undated) DEVICE — POSITIONER BOOT CRADLE

## (undated) DEVICE — DRSG STERISTRIPS 0.5X4"

## (undated) DEVICE — PACK ROBOTIC LIJ

## (undated) DEVICE — CHEST DRAIN PLEUR-EVAC DRY/WET ADULT-PEDS SINGLE (QUICK)

## (undated) DEVICE — DRSG SLING ARM MED

## (undated) DEVICE — XI DRAPE COLUMN

## (undated) DEVICE — XI SEAL UNIV 5- 8 MM

## (undated) DEVICE — XI DRAPE ARM

## (undated) DEVICE — SUT POLYSORB 2-0 30" V-20 UNDYED

## (undated) DEVICE — BLANKET WARMER LOWER ADULT

## (undated) DEVICE — SUT VICRYL 0 27" UR-6

## (undated) DEVICE — XI OBTURATOR OPTICAL BLADELESS 8MM

## (undated) DEVICE — DRAPE C ARM UNIVERSAL

## (undated) DEVICE — SUT VICRYL 3-0 27" RB-1 UNDYED